# Patient Record
Sex: MALE | Race: WHITE | NOT HISPANIC OR LATINO | Employment: PART TIME | URBAN - METROPOLITAN AREA
[De-identification: names, ages, dates, MRNs, and addresses within clinical notes are randomized per-mention and may not be internally consistent; named-entity substitution may affect disease eponyms.]

---

## 2018-01-18 ENCOUNTER — GENERIC CONVERSION - ENCOUNTER (OUTPATIENT)
Dept: OTHER | Facility: OTHER | Age: 63
End: 2018-01-18

## 2018-01-24 VITALS
WEIGHT: 140 LBS | HEART RATE: 100 BPM | BODY MASS INDEX: 22.5 KG/M2 | SYSTOLIC BLOOD PRESSURE: 110 MMHG | OXYGEN SATURATION: 99 % | DIASTOLIC BLOOD PRESSURE: 60 MMHG | TEMPERATURE: 97.7 F | HEIGHT: 66 IN

## 2018-03-23 RX ORDER — CIPROFLOXACIN 500 MG/1
500 TABLET, FILM COATED ORAL 2 TIMES DAILY
Refills: 0 | COMMUNITY
Start: 2018-03-01 | End: 2018-04-30 | Stop reason: ALTCHOICE

## 2018-03-23 RX ORDER — ASPIRIN 81 MG/1
TABLET, CHEWABLE ORAL AS NEEDED
COMMUNITY
End: 2018-07-19 | Stop reason: ALTCHOICE

## 2018-03-26 ENCOUNTER — OFFICE VISIT (OUTPATIENT)
Dept: INTERNAL MEDICINE CLINIC | Facility: CLINIC | Age: 63
End: 2018-03-26
Payer: COMMERCIAL

## 2018-03-26 VITALS
HEIGHT: 66 IN | DIASTOLIC BLOOD PRESSURE: 58 MMHG | TEMPERATURE: 97.9 F | BODY MASS INDEX: 23.01 KG/M2 | SYSTOLIC BLOOD PRESSURE: 100 MMHG | HEART RATE: 96 BPM | OXYGEN SATURATION: 98 % | WEIGHT: 143.2 LBS

## 2018-03-26 DIAGNOSIS — K40.20 NON-RECURRENT BILATERAL INGUINAL HERNIA WITHOUT OBSTRUCTION OR GANGRENE: Primary | ICD-10-CM

## 2018-03-26 PROBLEM — K40.90 UNILATERAL INGUINAL HERNIA WITHOUT OBSTRUCTION OR GANGRENE: Status: ACTIVE | Noted: 2018-01-18

## 2018-03-26 PROCEDURE — 99213 OFFICE O/P EST LOW 20 MIN: CPT | Performed by: INTERNAL MEDICINE

## 2018-03-26 NOTE — ASSESSMENT & PLAN NOTE
Patient presents with some suspicious pathological findings that may represent a possible lymphoma  Symptomatically, he does not present with any suspicious findings or symptoms  Labs have been requested

## 2018-03-26 NOTE — PROGRESS NOTES
Assessment/Plan:    Inguinal hernia bilateral, non-recurrent    Patient presents with some suspicious pathological findings that may represent a possible lymphoma  Symptomatically, he does not present with any suspicious findings or symptoms  Labs have been requested  Diagnoses and all orders for this visit:    Non-recurrent bilateral inguinal hernia without obstruction or gangrene  -     CBC and differential  -     Uric acid  -     Sedimentation rate, automated  -     Leukemia/Lymphoma flow cytometry    Other orders  -     aspirin 81 mg chewable tablet; Chew  -     ciprofloxacin (CIPRO) 500 mg tablet; Take 500 mg by mouth 2 (two) times a day  -     Multiple Vitamin (MULTI VITAMIN MENS PO); Take 1 tablet by mouth daily          Subjective:      Patient ID: Amber Reyes is a 63 y o  male  Patient presents to the office for follow-up visit after recent bilateral hernia repair  The pathology of the left hernia sac was suspicious for possible  Low-grade B-cell lymphoma  The patient denies any symptoms of weight loss, night sweats, fatigue  Appetite is good  He has no complaints regarding his wound healing  Family History   Problem Relation Age of Onset    Parkinsonism Mother     Thyroid disease Mother     Diabetes Father     Other Father      Heart Artery Stent     Social History     Social History    Marital status: Single     Spouse name: N/A    Number of children: N/A    Years of education: N/A     Occupational History    Not on file       Social History Main Topics    Smoking status: Never Smoker    Smokeless tobacco: Not on file    Alcohol use Yes      Comment: Occasional    Drug use: No    Sexual activity: Not on file     Other Topics Concern    Not on file     Social History Narrative    Caffeine use         Past Medical History:   Diagnosis Date    Hyperglycemia     Last Assessed:1/18/18       Current Outpatient Prescriptions:     aspirin 81 mg chewable tablet, Chew, Disp: , Rfl:     Multiple Vitamin (MULTI VITAMIN MENS PO), Take 1 tablet by mouth daily, Disp: , Rfl:     ciprofloxacin (CIPRO) 500 mg tablet, Take 500 mg by mouth 2 (two) times a day, Disp: , Rfl: 0  No Known Allergies  Past Surgical History:   Procedure Laterality Date    WISDOM TOOTH EXTRACTION      Last Assessed:1/18/18             Review of Systems   Constitutional: Negative  HENT: Negative  Eyes: Negative  Respiratory: Negative  Cardiovascular: Negative  Gastrointestinal: Negative  Endocrine: Negative  Genitourinary: Negative  Musculoskeletal: Negative  Allergic/Immunologic: Negative  Neurological: Negative  Hematological: Negative  Psychiatric/Behavioral: Negative  Objective:      /58 (BP Location: Left arm, Patient Position: Sitting, Cuff Size: Standard)   Pulse 96   Temp 97 9 °F (36 6 °C) (Oral)   Ht 5' 6" (1 676 m)   Wt 65 kg (143 lb 3 2 oz)   SpO2 98%   BMI 23 11 kg/m²          Physical Exam   Constitutional: He is oriented to person, place, and time  He appears well-developed and well-nourished  No distress  HENT:   Head: Normocephalic and atraumatic  Right Ear: External ear normal    Left Ear: External ear normal    Mouth/Throat: Oropharynx is clear and moist    Eyes: Conjunctivae and EOM are normal  Pupils are equal, round, and reactive to light  No scleral icterus  Neck: Normal range of motion  Neck supple  No JVD present  No tracheal deviation present  No thyromegaly present  Cardiovascular: Normal rate, regular rhythm and normal heart sounds  Pulmonary/Chest: Effort normal and breath sounds normal    Abdominal: Soft  Bowel sounds are normal  There is no hepatosplenomegaly  Musculoskeletal: Normal range of motion  He exhibits no edema or deformity  Lymphadenopathy:     He has no cervical adenopathy  Neurological: He is alert and oriented to person, place, and time  No cranial nerve deficit   Coordination normal    Skin: Skin is warm and dry  He is not diaphoretic  Psychiatric: He has a normal mood and affect  His behavior is normal    Vitals reviewed

## 2018-03-30 ENCOUNTER — APPOINTMENT (OUTPATIENT)
Dept: LAB | Facility: CLINIC | Age: 63
End: 2018-03-30
Payer: COMMERCIAL

## 2018-03-30 LAB
BASOPHILS # BLD AUTO: 0.02 THOUSANDS/ΜL (ref 0–0.1)
BASOPHILS NFR BLD AUTO: 0 % (ref 0–1)
EOSINOPHIL # BLD AUTO: 0.08 THOUSAND/ΜL (ref 0–0.61)
EOSINOPHIL NFR BLD AUTO: 2 % (ref 0–6)
ERYTHROCYTE [DISTWIDTH] IN BLOOD BY AUTOMATED COUNT: 15.9 % (ref 11.6–15.1)
ERYTHROCYTE [SEDIMENTATION RATE] IN BLOOD: 119 MM/HOUR (ref 0–10)
HCT VFR BLD AUTO: 28.2 % (ref 36.5–49.3)
HGB BLD-MCNC: 9.1 G/DL (ref 12–17)
LYMPHOCYTES # BLD AUTO: 1.23 THOUSANDS/ΜL (ref 0.6–4.47)
LYMPHOCYTES NFR BLD AUTO: 25 % (ref 14–44)
MCH RBC QN AUTO: 29.7 PG (ref 26.8–34.3)
MCHC RBC AUTO-ENTMCNC: 32.3 G/DL (ref 31.4–37.4)
MCV RBC AUTO: 92 FL (ref 82–98)
MONOCYTES # BLD AUTO: 0.5 THOUSAND/ΜL (ref 0.17–1.22)
MONOCYTES NFR BLD AUTO: 10 % (ref 4–12)
NEUTROPHILS # BLD AUTO: 3.08 THOUSANDS/ΜL (ref 1.85–7.62)
NEUTS SEG NFR BLD AUTO: 63 % (ref 43–75)
NRBC BLD AUTO-RTO: 0 /100 WBCS
PLATELET # BLD AUTO: 307 THOUSANDS/UL (ref 149–390)
PMV BLD AUTO: 8.6 FL (ref 8.9–12.7)
RBC # BLD AUTO: 3.06 MILLION/UL (ref 3.88–5.62)
URATE SERPL-MCNC: 5.3 MG/DL (ref 4.2–8)
WBC # BLD AUTO: 4.96 THOUSAND/UL (ref 4.31–10.16)

## 2018-03-30 PROCEDURE — 88184 FLOWCYTOMETRY/ TC 1 MARKER: CPT | Performed by: INTERNAL MEDICINE

## 2018-03-30 PROCEDURE — 85025 COMPLETE CBC W/AUTO DIFF WBC: CPT | Performed by: INTERNAL MEDICINE

## 2018-03-30 PROCEDURE — 36415 COLL VENOUS BLD VENIPUNCTURE: CPT | Performed by: INTERNAL MEDICINE

## 2018-03-30 PROCEDURE — 88185 FLOWCYTOMETRY/TC ADD-ON: CPT

## 2018-03-30 PROCEDURE — 84550 ASSAY OF BLOOD/URIC ACID: CPT | Performed by: INTERNAL MEDICINE

## 2018-03-30 PROCEDURE — 85652 RBC SED RATE AUTOMATED: CPT | Performed by: INTERNAL MEDICINE

## 2018-04-03 LAB — SCAN RESULT: NORMAL

## 2018-04-06 DIAGNOSIS — D47.9 ATYPICAL LYMPHOPROLIFERATIVE DISORDER (HCC): Primary | ICD-10-CM

## 2018-04-30 ENCOUNTER — OFFICE VISIT (OUTPATIENT)
Dept: HEMATOLOGY ONCOLOGY | Facility: CLINIC | Age: 63
End: 2018-04-30
Payer: COMMERCIAL

## 2018-04-30 VITALS
TEMPERATURE: 98.8 F | RESPIRATION RATE: 18 BRPM | HEART RATE: 92 BPM | WEIGHT: 146 LBS | BODY MASS INDEX: 22.91 KG/M2 | SYSTOLIC BLOOD PRESSURE: 124 MMHG | OXYGEN SATURATION: 98 % | HEIGHT: 67 IN | DIASTOLIC BLOOD PRESSURE: 74 MMHG

## 2018-04-30 DIAGNOSIS — D72.820 MONOCLONAL B-CELL LYMPHOCYTOSIS: ICD-10-CM

## 2018-04-30 DIAGNOSIS — D64.9 ANEMIA, UNSPECIFIED TYPE: Primary | ICD-10-CM

## 2018-04-30 PROCEDURE — 99205 OFFICE O/P NEW HI 60 MIN: CPT | Performed by: INTERNAL MEDICINE

## 2018-04-30 NOTE — PROGRESS NOTES
Christie Rose  1955  [address] HEMATOLOGY ONCOLOGY [address] [address] [phone]    Chief Complaint   Patient presents with    Consult            No history exists  History of Present Illness:  -Tj Dumont MD:  -Previous Therapy (if not listed in Oncology History):  -Current Therapy (if not listed in Oncology History):  -Disease Status:  -Interval History:  -Tumor Markers:    Review of Systems   Constitutional: Negative for chills and fever  HENT: Negative for nosebleeds  Eyes: Negative for discharge  Respiratory: Negative for cough and shortness of breath  Cardiovascular: Negative for chest pain  Gastrointestinal: Negative for abdominal pain, constipation and diarrhea  Endocrine: Negative for polydipsia  Genitourinary: Negative for hematuria  Musculoskeletal: Negative for arthralgias  Skin: Negative for color change  Allergic/Immunologic: Negative for immunocompromised state  Neurological: Negative for dizziness and headaches  Hematological: Negative for adenopathy  Psychiatric/Behavioral: Negative for agitation  Patient Active Problem List   Diagnosis    Unilateral inguinal hernia without obstruction or gangrene    Inguinal hernia bilateral, non-recurrent     Past Medical History:   Diagnosis Date    Hyperglycemia     Last Assessed:1/18/18     Past Surgical History:   Procedure Laterality Date    WISDOM TOOTH EXTRACTION      Last Assessed:1/18/18     Family History   Problem Relation Age of Onset    Parkinsonism Mother     Thyroid disease Mother     Diabetes Father     Other Father      Heart Artery Stent     Social History     Social History    Marital status: Single     Spouse name: N/A    Number of children: N/A    Years of education: N/A     Occupational History    Not on file       Social History Main Topics    Smoking status: Never Smoker    Smokeless tobacco: Not on file    Alcohol use Yes      Comment: Occasional    Drug use: No    Sexual activity: Not on file     Other Topics Concern    Not on file     Social History Narrative    Caffeine use           Current Outpatient Prescriptions:     aspirin 81 mg chewable tablet, Chew  , Disp: , Rfl:     Multiple Vitamin (MULTI VITAMIN MENS PO), Take 1 tablet by mouth daily, Disp: , Rfl:   No Known Allergies  Vitals:    04/30/18 1034   BP: 124/74   Pulse: 92   Resp: 18   Temp: 98 8 °F (37 1 °C)   SpO2: 98%       Physical Exam   Constitutional: He is oriented to person, place, and time  He appears well-developed  HENT:   Head: Normocephalic  Eyes: Pupils are equal, round, and reactive to light  Neck: Neck supple  Cardiovascular: Normal rate and regular rhythm  No murmur heard  Pulmonary/Chest: Breath sounds normal  He has no wheezes  He has no rales  Abdominal: Soft  There is no tenderness  Musculoskeletal: Normal range of motion  He exhibits no edema or tenderness  Lymphadenopathy:     He has no cervical adenopathy  Neurological: He is alert and oriented to person, place, and time  He has normal reflexes  No cranial nerve deficit  Skin: No rash noted  No erythema  Psychiatric: He has a normal mood and affect  His behavior is normal          Labs:  CBC, Coags, BMP, Mg, Phos     Imaging  No results found  I reviewed the above laboratory and imaging data  Discussion/Summary:  In summary, this is a 53-year-old man who was found to have anemia about 6 weeks after bilateral inguinal hernia repair  He had not had blood work prior to that time  He has not had a colonoscopy  Interestingly, 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)  This is essentially the mature B lymphocyte analog of MGUS  This can be associated with autoimmune conditions, immune malignancies, chronic inflammatory processes, or as a stand-alone diagnosis    It would be unusual for anemia to be a complication of MB L  anemia workup is requested, therefore  He did have a significantly elevated sed rate raising the possibility of multiple myeloma or other conditions  Serologies are requested  I would favor proceeding with a CT scan but let us proceed with blood work, colonoscopy, etc and revisit the issue at his next visit  I reviewed the above at length with the patient and his brother  They voiced understanding and agreement

## 2018-04-30 NOTE — LETTER
April 30, 2018     Raman Delaney MD  [address]    Patient: Aida Blakely   YOB: 1955   Date of Visit: 4/30/2018       Dear Dr Rafael Lin: Thank you for referring Aida Blakely to me for evaluation  Below are my notes for this consultation  If you have questions, please do not hesitate to call me  I look forward to following your patient along with you  Sincerely,        Haylie Avila,         CC: DO Haylie Evans DO  4/30/2018 11:32 AM  Sign at close encounter    Aida Blakely  1955  [place]  [address]  [phone]    Chief Complaint   Patient presents with    Consult            No history exists  History of Present Illness:  -Jason Prajapati MD:  -Previous Therapy (if not listed in Oncology History):  -Current Therapy (if not listed in Oncology History):  -Disease Status:  -Interval History:  -Tumor Markers:    Review of Systems   Constitutional: Negative for chills and fever  HENT: Negative for nosebleeds  Eyes: Negative for discharge  Respiratory: Negative for cough and shortness of breath  Cardiovascular: Negative for chest pain  Gastrointestinal: Negative for abdominal pain, constipation and diarrhea  Endocrine: Negative for polydipsia  Genitourinary: Negative for hematuria  Musculoskeletal: Negative for arthralgias  Skin: Negative for color change  Allergic/Immunologic: Negative for immunocompromised state  Neurological: Negative for dizziness and headaches  Hematological: Negative for adenopathy  Psychiatric/Behavioral: Negative for agitation         Patient Active Problem List   Diagnosis    Unilateral inguinal hernia without obstruction or gangrene    Inguinal hernia bilateral, non-recurrent     Past Medical History:   Diagnosis Date    Hyperglycemia     Last Assessed:1/18/18     Past Surgical History:   Procedure Laterality Date    WISDOM TOOTH EXTRACTION      Last Assessed:1/18/18     Family History   Problem Relation Age of Onset    Parkinsonism Mother     Thyroid disease Mother     Diabetes Father     Other Father      Heart Artery Stent     Social History     Social History    Marital status: Single     Spouse name: N/A    Number of children: N/A    Years of education: N/A     Occupational History    Not on file  Social History Main Topics    Smoking status: Never Smoker    Smokeless tobacco: Not on file    Alcohol use Yes      Comment: Occasional    Drug use: No    Sexual activity: Not on file     Other Topics Concern    Not on file     Social History Narrative    Caffeine use           Current Outpatient Prescriptions:     aspirin 81 mg chewable tablet, Chew  , Disp: , Rfl:     Multiple Vitamin (MULTI VITAMIN MENS PO), Take 1 tablet by mouth daily, Disp: , Rfl:   No Known Allergies  Vitals:    04/30/18 1034   BP: 124/74   Pulse: 92   Resp: 18   Temp: 98 8 °F (37 1 °C)   SpO2: 98%       Physical Exam   Constitutional: He is oriented to person, place, and time  He appears well-developed  HENT:   Head: Normocephalic  Eyes: Pupils are equal, round, and reactive to light  Neck: Neck supple  Cardiovascular: Normal rate and regular rhythm  No murmur heard  Pulmonary/Chest: Breath sounds normal  He has no wheezes  He has no rales  Abdominal: Soft  There is no tenderness  Musculoskeletal: Normal range of motion  He exhibits no edema or tenderness  Lymphadenopathy:     He has no cervical adenopathy  Neurological: He is alert and oriented to person, place, and time  He has normal reflexes  No cranial nerve deficit  Skin: No rash noted  No erythema  Psychiatric: He has a normal mood and affect  His behavior is normal          Labs:  CBC, Coags, BMP, Mg, Phos     Imaging  No results found    I reviewed the above laboratory and imaging data  Discussion/Summary:  In summary, this is a 80-year-old man who was found to have anemia about 6 weeks after bilateral inguinal hernia repair  He had not had blood work prior to that time  He has not had a colonoscopy  Interestingly, 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)  This is essentially the mature B lymphocyte analog of MGUS  This can be associated with autoimmune conditions, immune malignancies, chronic inflammatory processes, or as a stand-alone diagnosis  It would be unusual for anemia to be a complication of MB L  anemia workup is requested, therefore  He did have a significantly elevated sed rate raising the possibility of multiple myeloma or other conditions  Serologies are requested  I would favor proceeding with a CT scan but let us proceed with blood work, colonoscopy, etc and revisit the issue at his next visit  I reviewed the above at length with the patient and his brother  They voiced understanding and agreement

## 2018-05-01 ENCOUNTER — OFFICE VISIT (OUTPATIENT)
Dept: INTERNAL MEDICINE CLINIC | Facility: CLINIC | Age: 63
End: 2018-05-01
Payer: COMMERCIAL

## 2018-05-01 ENCOUNTER — APPOINTMENT (OUTPATIENT)
Dept: RADIOLOGY | Age: 63
End: 2018-05-01
Payer: COMMERCIAL

## 2018-05-01 VITALS
SYSTOLIC BLOOD PRESSURE: 100 MMHG | OXYGEN SATURATION: 98 % | BODY MASS INDEX: 23.07 KG/M2 | HEIGHT: 67 IN | DIASTOLIC BLOOD PRESSURE: 58 MMHG | WEIGHT: 147 LBS | HEART RATE: 99 BPM | TEMPERATURE: 98.7 F
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
## 2018-05-01 DIAGNOSIS — R07.81 RIB PAIN ON LEFT SIDE: ICD-10-CM
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
## 2018-05-01 PROCEDURE — 99213 OFFICE O/P EST LOW 20 MIN: CPT | Performed by: INTERNAL MEDICINE

## 2018-05-01 PROCEDURE — 71101 X-RAY EXAM UNILAT RIBS/CHEST: CPT

## 2018-05-01 NOTE — ASSESSMENT & PLAN NOTE
Will order XR of ribs to evaluate for fracture  Recommend using an incentive spirometer  Continue with OTC tylenol or NSAIDs

## 2018-05-01 NOTE — PROGRESS NOTES
Assessment/Plan:    Rib pain on left side  Will order XR of ribs to evaluate for fracture  Recommend using an incentive spirometer  Continue with OTC tylenol or NSAIDs  Diagnoses and all orders for this visit:    Fall, initial encounter  -     XR ribs 2 vw left; Future  -     Respiratory Therapy Supplies (SPIROMETER) KIT; by Does not apply route every 2 (two) hours    Rib pain on left side  -     XR ribs 2 vw left; Future  -     Respiratory Therapy Supplies (SPIROMETER) KIT; by Does not apply route every 2 (two) hours          Subjective:      Patient ID: Shereen Nunez is a 63 y o  male  63year old male is seen today for evaluation of left back/rib pain that started after sustaining a mechanical fall  He fell 4-days ago  Denies any head or LOC  Back Pain   This is a new problem  The current episode started in the past 7 days  The problem is unchanged  Pain location: left posterior rib pain (~rib #10) The quality of the pain is described as stabbing  The pain does not radiate  The pain is at a severity of 5/10  The pain is moderate  The symptoms are aggravated by twisting  Stiffness is present all day  Pertinent negatives include no abdominal pain, bladder incontinence, bowel incontinence, chest pain, dysuria, fever, headaches, leg pain, numbness, paresis, paresthesias, pelvic pain, perianal numbness, tingling, weakness or weight loss  He has tried NSAIDs for the symptoms  The treatment provided mild relief  Fall   The accident occurred 3 to 5 days ago  The fall occurred while walking  He fell from a height of 1 to 2 ft  He landed on hard floor  Point of impact: left posterior ribs, back  The pain is moderate  Pertinent negatives include no abdominal pain, bowel incontinence, fever, headaches, hematuria, nausea, numbness or tingling         The following portions of the patient's history were reviewed and updated as appropriate: allergies, current medications, past family history, past medical history, past social history, past surgical history and problem list     Review of Systems   Constitutional: Negative  Negative for chills, fatigue, fever and weight loss  HENT: Negative for congestion, ear pain, postnasal drip, rhinorrhea and sore throat  Eyes: Negative  Respiratory: Negative for cough, chest tightness, shortness of breath and wheezing  Cardiovascular: Negative for chest pain and palpitations  Gastrointestinal: Negative for abdominal distention, abdominal pain, blood in stool, bowel incontinence, constipation, diarrhea and nausea  Endocrine: Negative  Genitourinary: Negative for bladder incontinence, difficulty urinating, dysuria, hematuria and pelvic pain  Musculoskeletal: Positive for back pain  Skin: Negative  Allergic/Immunologic: Negative for environmental allergies and food allergies  Neurological: Negative  Negative for tingling, weakness, numbness, headaches and paresthesias  Hematological: Negative for adenopathy  Psychiatric/Behavioral: Negative for agitation, behavioral problems, confusion and sleep disturbance           Past Medical History:   Diagnosis Date    Anemia, unspecified 4/30/2018    Hyperglycemia     Last Assessed:1/18/18         Current Outpatient Prescriptions:     aspirin 81 mg chewable tablet, Chew  , Disp: , Rfl:     Multiple Vitamin (MULTI VITAMIN MENS PO), Take 1 tablet by mouth daily, Disp: , Rfl:     Respiratory Therapy Supplies (SPIROMETER) KIT, by Does not apply route every 2 (two) hours, Disp: 1 kit, Rfl: 0    No Known Allergies    Social History   Past Surgical History:   Procedure Laterality Date    WISDOM TOOTH EXTRACTION      Last Assessed:1/18/18     Family History   Problem Relation Age of Onset   Gerard Parkinsonism Mother     Thyroid disease Mother     Diabetes Father     Other Father      Heart Artery Stent       Objective:  /58 (BP Location: Left arm, Patient Position: Sitting, Cuff Size: Standard)   Pulse 99 Temp 98 7 °F (37 1 °C) (Oral)   Ht 5' 7 3" (1 709 m)   Wt 66 7 kg (147 lb)   SpO2 98%   BMI 22 82 kg/m²     Recent Results (from the past 1344 hour(s))   CBC and differential    Collection Time: 03/30/18 12:17 PM   Result Value Ref Range    WBC 4 96 4 31 - 10 16 Thousand/uL    RBC 3 06 (L) 3 88 - 5 62 Million/uL    Hemoglobin 9 1 (L) 12 0 - 17 0 g/dL    Hematocrit 28 2 (L) 36 5 - 49 3 %    MCV 92 82 - 98 fL    MCH 29 7 26 8 - 34 3 pg    MCHC 32 3 31 4 - 37 4 g/dL    RDW 15 9 (H) 11 6 - 15 1 %    MPV 8 6 (L) 8 9 - 12 7 fL    Platelets 691 066 - 803 Thousands/uL    nRBC 0 /100 WBCs    Neutrophils Relative 63 43 - 75 %    Lymphocytes Relative 25 14 - 44 %    Monocytes Relative 10 4 - 12 %    Eosinophils Relative 2 0 - 6 %    Basophils Relative 0 0 - 1 %    Neutrophils Absolute 3 08 1 85 - 7 62 Thousands/µL    Lymphocytes Absolute 1 23 0 60 - 4 47 Thousands/µL    Monocytes Absolute 0 50 0 17 - 1 22 Thousand/µL    Eosinophils Absolute 0 08 0 00 - 0 61 Thousand/µL    Basophils Absolute 0 02 0 00 - 0 10 Thousands/µL   Uric acid    Collection Time: 03/30/18 12:17 PM   Result Value Ref Range    Uric Acid 5 3 4 2 - 8 0 mg/dL   Sedimentation rate, automated    Collection Time: 03/30/18 12:17 PM   Result Value Ref Range    Sed Rate 119 (H) 0 - 10 mm/hour   Leukemia/Lymphoma flow cytometry    Collection Time: 03/30/18 12:17 PM   Result Value Ref Range    Scan Result SEE WRITTEN REPORT FROM [facility]             Physical Exam   Constitutional: He is oriented to person, place, and time  He appears well-developed and well-nourished  No distress  HENT:   Head: Normocephalic and atraumatic  Eyes: Conjunctivae and EOM are normal  Pupils are equal, round, and reactive to light  Right eye exhibits no discharge  Left eye exhibits no discharge  No scleral icterus  Neck: Normal range of motion  Neck supple  No JVD present  No thyromegaly present     Cardiovascular: Normal rate, regular rhythm, normal heart sounds and intact distal pulses  Exam reveals no gallop and no friction rub  No murmur heard  Pulmonary/Chest: Effort normal and breath sounds normal  No respiratory distress  He has no wheezes  He has no rales  He exhibits no tenderness  Abdominal: Soft  Bowel sounds are normal  He exhibits no distension and no mass  There is no tenderness  There is no rebound and no guarding  Musculoskeletal: Normal range of motion  He exhibits no edema, tenderness or deformity  Lymphadenopathy:     He has no cervical adenopathy  Neurological: He is alert and oriented to person, place, and time  He has normal reflexes  No cranial nerve deficit  Coordination normal    Skin: Skin is warm and dry  No rash noted  He is not diaphoretic  No erythema  No pallor  Psychiatric: He has a normal mood and affect  His behavior is normal  Judgment and thought content normal    Nursing note and vitals reviewed

## 2018-05-07 ENCOUNTER — APPOINTMENT (OUTPATIENT)
Dept: LAB | Facility: CLINIC | Age: 63
End: 2018-05-07
Payer: COMMERCIAL

## 2018-05-07 DIAGNOSIS — D64.9 ANEMIA, UNSPECIFIED TYPE: ICD-10-CM

## 2018-05-07 LAB
ALBUMIN SERPL BCP-MCNC: 3 G/DL (ref 3.5–5)
ALP SERPL-CCNC: 120 U/L (ref 46–116)
ALT SERPL W P-5'-P-CCNC: 17 U/L (ref 12–78)
ANION GAP SERPL CALCULATED.3IONS-SCNC: 6 MMOL/L (ref 4–13)
AST SERPL W P-5'-P-CCNC: 16 U/L (ref 5–45)
BASOPHILS # BLD AUTO: 0.02 THOUSANDS/ΜL (ref 0–0.1)
BASOPHILS NFR BLD AUTO: 0 % (ref 0–1)
BILIRUB SERPL-MCNC: 0.21 MG/DL (ref 0.2–1)
BUN SERPL-MCNC: 12 MG/DL (ref 5–25)
CALCIUM SERPL-MCNC: 9.6 MG/DL (ref 8.3–10.1)
CHLORIDE SERPL-SCNC: 103 MMOL/L (ref 100–108)
CO2 SERPL-SCNC: 25 MMOL/L (ref 21–32)
CREAT SERPL-MCNC: 1.01 MG/DL (ref 0.6–1.3)
CRP SERPL QL: 20.8 MG/L
EOSINOPHIL # BLD AUTO: 0.1 THOUSAND/ΜL (ref 0–0.61)
EOSINOPHIL NFR BLD AUTO: 2 % (ref 0–6)
ERYTHROCYTE [DISTWIDTH] IN BLOOD BY AUTOMATED COUNT: 16.8 % (ref 11.6–15.1)
FERRITIN SERPL-MCNC: 33 NG/ML (ref 8–388)
FOLATE SERPL-MCNC: >20 NG/ML (ref 3.1–17.5)
GFR SERPL CREATININE-BSD FRML MDRD: 80 ML/MIN/1.73SQ M
GLUCOSE P FAST SERPL-MCNC: 100 MG/DL (ref 65–99)
HCT VFR BLD AUTO: 27.8 % (ref 36.5–49.3)
HGB BLD-MCNC: 8.9 G/DL (ref 12–17)
IRON SATN MFR SERPL: 45 %
IRON SERPL-MCNC: 120 UG/DL (ref 65–175)
LYMPHOCYTES # BLD AUTO: 1.31 THOUSANDS/ΜL (ref 0.6–4.47)
LYMPHOCYTES NFR BLD AUTO: 27 % (ref 14–44)
MCH RBC QN AUTO: 29.7 PG (ref 26.8–34.3)
MCHC RBC AUTO-ENTMCNC: 32 G/DL (ref 31.4–37.4)
MCV RBC AUTO: 93 FL (ref 82–98)
MONOCYTES # BLD AUTO: 0.5 THOUSAND/ΜL (ref 0.17–1.22)
MONOCYTES NFR BLD AUTO: 10 % (ref 4–12)
NEUTROPHILS # BLD AUTO: 2.87 THOUSANDS/ΜL (ref 1.85–7.62)
NEUTS SEG NFR BLD AUTO: 61 % (ref 43–75)
NRBC BLD AUTO-RTO: 0 /100 WBCS
PLATELET # BLD AUTO: 298 THOUSANDS/UL (ref 149–390)
PMV BLD AUTO: 8.6 FL (ref 8.9–12.7)
POTASSIUM SERPL-SCNC: 4 MMOL/L (ref 3.5–5.3)
PROT SERPL-MCNC: 9.5 G/DL (ref 6.4–8.2)
RBC # BLD AUTO: 3 MILLION/UL (ref 3.88–5.62)
SODIUM SERPL-SCNC: 134 MMOL/L (ref 136–145)
TIBC SERPL-MCNC: 267 UG/DL (ref 250–450)
TSH SERPL DL<=0.05 MIU/L-ACNC: 2.99 UIU/ML (ref 0.36–3.74)
VIT B12 SERPL-MCNC: 371 PG/ML (ref 100–900)
WBC # BLD AUTO: 4.84 THOUSAND/UL (ref 4.31–10.16)

## 2018-05-07 PROCEDURE — 80053 COMPREHEN METABOLIC PANEL: CPT

## 2018-05-07 PROCEDURE — 86038 ANTINUCLEAR ANTIBODIES: CPT

## 2018-05-07 PROCEDURE — 82728 ASSAY OF FERRITIN: CPT

## 2018-05-07 PROCEDURE — 36415 COLL VENOUS BLD VENIPUNCTURE: CPT

## 2018-05-07 PROCEDURE — 86334 IMMUNOFIX E-PHORESIS SERUM: CPT

## 2018-05-07 PROCEDURE — 86334 IMMUNOFIX E-PHORESIS SERUM: CPT | Performed by: PATHOLOGY

## 2018-05-07 PROCEDURE — 83550 IRON BINDING TEST: CPT

## 2018-05-07 PROCEDURE — 83540 ASSAY OF IRON: CPT

## 2018-05-07 PROCEDURE — 86140 C-REACTIVE PROTEIN: CPT

## 2018-05-07 PROCEDURE — 85025 COMPLETE CBC W/AUTO DIFF WBC: CPT

## 2018-05-07 PROCEDURE — 86430 RHEUMATOID FACTOR TEST QUAL: CPT

## 2018-05-07 PROCEDURE — 84165 PROTEIN E-PHORESIS SERUM: CPT

## 2018-05-07 PROCEDURE — 82746 ASSAY OF FOLIC ACID SERUM: CPT

## 2018-05-07 PROCEDURE — 84443 ASSAY THYROID STIM HORMONE: CPT

## 2018-05-07 PROCEDURE — 84165 PROTEIN E-PHORESIS SERUM: CPT | Performed by: PATHOLOGY

## 2018-05-07 PROCEDURE — 82607 VITAMIN B-12: CPT

## 2018-05-08 LAB — RHEUMATOID FACT SER QL LA: NEGATIVE

## 2018-05-09 ENCOUNTER — APPOINTMENT (OUTPATIENT)
Dept: LAB | Facility: CLINIC | Age: 63
End: 2018-05-09
Payer: COMMERCIAL

## 2018-05-09 ENCOUNTER — TELEPHONE (OUTPATIENT)
Dept: INTERNAL MEDICINE CLINIC | Age: 63
End: 2018-05-09

## 2018-05-09 DIAGNOSIS — D64.9 ANEMIA, UNSPECIFIED TYPE: ICD-10-CM

## 2018-05-09 LAB
ALBUMIN SERPL ELPH-MCNC: 3.68 G/DL (ref 3.5–5)
ALBUMIN SERPL ELPH-MCNC: 39.6 % (ref 52–65)
ALPHA1 GLOB SERPL ELPH-MCNC: 0.41 G/DL (ref 0.1–0.4)
ALPHA1 GLOB SERPL ELPH-MCNC: 4.4 % (ref 2.5–5)
ALPHA2 GLOB SERPL ELPH-MCNC: 1.16 G/DL (ref 0.4–1.2)
ALPHA2 GLOB SERPL ELPH-MCNC: 12.5 % (ref 7–13)
BETA GLOB ABNORMAL SERPL ELPH-MCNC: 0.41 G/DL (ref 0.4–0.8)
BETA1 GLOB SERPL ELPH-MCNC: 4.4 % (ref 5–13)
BETA2 GLOB SERPL ELPH-MCNC: 2.8 % (ref 2–8)
BETA2+GAMMA GLOB SERPL ELPH-MCNC: 0.26 G/DL (ref 0.2–0.5)
GAMMA GLOB ABNORMAL SERPL ELPH-MCNC: 3.38 G/DL (ref 0.5–1.6)
GAMMA GLOB SERPL ELPH-MCNC: 36.3 % (ref 12–22)
HEMOCCULT STL QL IA: NEGATIVE
IGG/ALB SER: 0.66 {RATIO} (ref 1.1–1.8)
INTERPRETATION UR IFE-IMP: NORMAL
M PROTEIN 1 MFR SERPL ELPH: 32 %
M PROTEIN 1 SERPL ELPH-MCNC: 2.98 G/DL
PROT SERPL-MCNC: 9.3 G/DL (ref 6.4–8.2)
RYE IGE QN: NEGATIVE

## 2018-05-09 PROCEDURE — G0328 FECAL BLOOD SCRN IMMUNOASSAY: HCPCS

## 2018-05-09 NOTE — TELEPHONE ENCOUNTER
Patient returned my call  He had no questions and verbalized that he was aware of the results and treatment recommendations

## 2018-05-09 NOTE — TELEPHONE ENCOUNTER
----- Message from Memo Elmore RN sent at 5/9/2018 10:22 AM EDT -----  #2 Non urgent Message left on [phone] requesting a call back for results  ----- Message -----  From: Mitch Blunt MD  Sent: 5/7/2018   9:33 AM  To: Memo Elmore RN    Can you please contact Mr Ted Potter him of the results of CXR, which showed no acute fractures  He should continue use of incentive spirometer until rib pain resolves  Thank you

## 2018-05-14 ENCOUNTER — TRANSCRIBE ORDERS (OUTPATIENT)
Dept: LAB | Facility: CLINIC | Age: 63
End: 2018-05-14

## 2018-05-14 ENCOUNTER — APPOINTMENT (OUTPATIENT)
Dept: LAB | Facility: CLINIC | Age: 63
End: 2018-05-14
Payer: COMMERCIAL

## 2018-05-14 PROCEDURE — 83883 ASSAY NEPHELOMETRY NOT SPEC: CPT

## 2018-05-14 PROCEDURE — 36415 COLL VENOUS BLD VENIPUNCTURE: CPT

## 2018-05-15 LAB
KAPPA LC FREE SER-MCNC: 190.3 MG/L (ref 3.3–19.4)
KAPPA LC FREE/LAMBDA FREE SER: 36.6 {RATIO} (ref 0.26–1.65)
LAMBDA LC FREE SERPL-MCNC: 5.2 MG/L (ref 5.7–26.3)

## 2018-05-31 ENCOUNTER — OFFICE VISIT (OUTPATIENT)
Dept: HEMATOLOGY ONCOLOGY | Facility: CLINIC | Age: 63
End: 2018-05-31
Payer: COMMERCIAL

## 2018-05-31 VITALS
BODY MASS INDEX: 22.26 KG/M2 | RESPIRATION RATE: 16 BRPM | HEART RATE: 89 BPM | OXYGEN SATURATION: 97 % | DIASTOLIC BLOOD PRESSURE: 70 MMHG | TEMPERATURE: 98.6 F | SYSTOLIC BLOOD PRESSURE: 106 MMHG | WEIGHT: 141.8 LBS | HEIGHT: 67 IN

## 2018-05-31 DIAGNOSIS — D64.9 ANEMIA, UNSPECIFIED TYPE: ICD-10-CM

## 2018-05-31 DIAGNOSIS — D72.820 MONOCLONAL B-CELL LYMPHOCYTOSIS: Primary | ICD-10-CM

## 2018-05-31 PROCEDURE — 99215 OFFICE O/P EST HI 40 MIN: CPT | Performed by: INTERNAL MEDICINE

## 2018-05-31 NOTE — PROGRESS NOTES
Teofilo Abernathy  1955  [facility] HEMATOLOGY ONCOLOGY Tatyana Yaneth Adhikari [address] [address] [phone]    Chief Complaint   Patient presents with    Follow-up          No history exists  History of Present Illness:  -No ref  provider found:    -Interval History:  Patient is clinically stable  He recently had a colonoscopy which was incomplete  CT scan is requested as supplement  -Tumor Markers:    Review of Systems:  Review of Systems   Constitutional: Negative for chills and fever  HENT: Negative for nosebleeds  Eyes: Negative for discharge  Respiratory: Negative for cough and shortness of breath  Cardiovascular: Negative for chest pain  Gastrointestinal: Negative for abdominal pain, constipation and diarrhea  Endocrine: Negative for polydipsia  Genitourinary: Negative for hematuria  Musculoskeletal: Negative for arthralgias  Skin: Negative for color change  Allergic/Immunologic: Negative for immunocompromised state  Neurological: Negative for dizziness and headaches  Hematological: Negative for adenopathy  Psychiatric/Behavioral: Negative for agitation         Patient Active Problem List   Diagnosis    Unilateral inguinal hernia without obstruction or gangrene    Inguinal hernia bilateral, non-recurrent    Anemia, unspecified    Monoclonal B-cell lymphocytosis    Fall    Rib pain on left side     Past Medical History:   Diagnosis Date    Anemia, unspecified 4/30/2018    Hyperglycemia     Last Assessed:1/18/18     Past Surgical History:   Procedure Laterality Date    WISDOM TOOTH EXTRACTION      Last Assessed:1/18/18     Family History   Problem Relation Age of Onset    Parkinsonism Mother     Thyroid disease Mother     Diabetes Father     Other Father      Heart Artery Stent     Social History     Social History    Marital status: Single     Spouse name: N/A    Number of children: N/A    Years of education: N/A     Occupational History    Not on file  Social History Main Topics    Smoking status: Never Smoker    Smokeless tobacco: Former User    Alcohol use Yes      Comment: Occasional    Drug use: No    Sexual activity: Not on file     Other Topics Concern    Not on file     Social History Narrative    Caffeine use           Current Outpatient Prescriptions:     aspirin 81 mg chewable tablet, Chew  , Disp: , Rfl:     Multiple Vitamin (MULTI VITAMIN MENS PO), Take 1 tablet by mouth daily, Disp: , Rfl:   No Known Allergies  Vitals:    05/31/18 1205   BP: 106/70   Pulse: 89   Resp: 16   Temp: 98 6 °F (37 °C)   SpO2: 97%         Physical Exam   Constitutional: He is oriented to person, place, and time  He appears well-developed  HENT:   Head: Normocephalic  Eyes: Pupils are equal, round, and reactive to light  Neck: Neck supple  Cardiovascular: Normal rate and regular rhythm  No murmur heard  Pulmonary/Chest: Breath sounds normal  He has no wheezes  He has no rales  Abdominal: Soft  There is no tenderness  Musculoskeletal: Normal range of motion  He exhibits no edema or tenderness  Lymphadenopathy:     He has no cervical adenopathy  Neurological: He is alert and oriented to person, place, and time  He has normal reflexes  No cranial nerve deficit  Abnormal muscle tone:   Skin: No rash noted  No erythema  Psychiatric: He has a normal mood and affect  His behavior is normal            Performance Status: ECOG/Zubrod/WHO: 0 - Asymptomatic    Labs:  CBC, Coags, BMP, Mg, Phos     Imaging  Xr Ribs Left W Pa Chest Min 3 Views    Result Date: 5/5/2018  Narrative: LEFT RIBS AND CHEST -DUAL ENERGY INDICATION:   W19  XXXA: Unspecified fall, initial encounter R07 81: Pleurodynia  COMPARISON:  None EXAM PERFORMED/VIEWS:  XR RIBS LEFT W PA CHEST MIN 3 VIEWS FINDINGS: The cardiomediastinal silhouette is unremarkable  Lungs are clear  No pleural effusions  There is no pneumothorax   No rib fractures are identified  Impression: No active cardiopulmonary disease  No evidence of rib fractures  Workstation performed: [id number]     I reviewed the above laboratory and imaging data  Discussion/Summary:  In summary, this is a 57-year-old male history of monoclonal B-cell lymphocytosis  Additionally, he had anemia  Etiology was unclear  SPEP showed monoclonal peak  Immunofixation showed IgM kappa  Free light chains showed free kappa 190 3, free lambda 5 2, ratio 36 6  Myeloma versus Waldenstroms are considered  MGUS is another consideration although less likely given the height of his free light chain ratio  We reviewed that studies are requested  Quantitative immunoglobulins, skeletal survey, bone marrow biopsy  We reviewed the nature of the bone marrow biopsy procedure  IR is requested  I reviewed the above with the patient and his brother  They voiced understanding and agreement

## 2018-06-04 ENCOUNTER — APPOINTMENT (OUTPATIENT)
Dept: LAB | Age: 63
End: 2018-06-04
Payer: COMMERCIAL

## 2018-06-04 ENCOUNTER — APPOINTMENT (OUTPATIENT)
Dept: RADIOLOGY | Age: 63
End: 2018-06-04
Payer: COMMERCIAL

## 2018-06-04 DIAGNOSIS — D72.820 MONOCLONAL B-CELL LYMPHOCYTOSIS: ICD-10-CM

## 2018-06-04 LAB
IGA SERPL-MCNC: 21 MG/DL (ref 70–400)
IGG SERPL-MCNC: 353 MG/DL (ref 700–1600)
IGM SERPL-MCNC: 3730 MG/DL (ref 40–230)

## 2018-06-04 PROCEDURE — 36415 COLL VENOUS BLD VENIPUNCTURE: CPT

## 2018-06-04 PROCEDURE — 82784 ASSAY IGA/IGD/IGG/IGM EACH: CPT

## 2018-06-04 PROCEDURE — 77075 RADEX OSSEOUS SURVEY COMPL: CPT

## 2018-06-07 ENCOUNTER — HOSPITAL ENCOUNTER (OUTPATIENT)
Dept: RADIOLOGY | Facility: HOSPITAL | Age: 63
Discharge: HOME | End: 2018-06-07
Attending: INTERNAL MEDICINE | Admitting: RADIOLOGY
Payer: COMMERCIAL

## 2018-06-07 VITALS
BODY MASS INDEX: 22.13 KG/M2 | DIASTOLIC BLOOD PRESSURE: 63 MMHG | SYSTOLIC BLOOD PRESSURE: 96 MMHG | TEMPERATURE: 98.9 F | RESPIRATION RATE: 16 BRPM | HEART RATE: 78 BPM | OXYGEN SATURATION: 98 % | WEIGHT: 141 LBS | HEIGHT: 67 IN

## 2018-06-07 DIAGNOSIS — D72.820 MONOCLONAL B-CELL LYMPHOCYTOSIS: ICD-10-CM

## 2018-06-07 PROCEDURE — 88305 TISSUE EXAM BY PATHOLOGIST: CPT | Performed by: PATHOLOGY

## 2018-06-07 PROCEDURE — 88237 TISSUE CULTURE BONE MARROW: CPT | Performed by: INTERNAL MEDICINE

## 2018-06-07 PROCEDURE — 88341 IMHCHEM/IMCYTCHM EA ADD ANTB: CPT | Performed by: PATHOLOGY

## 2018-06-07 PROCEDURE — 88184 FLOWCYTOMETRY/ TC 1 MARKER: CPT | Performed by: INTERNAL MEDICINE

## 2018-06-07 PROCEDURE — 88313 SPECIAL STAINS GROUP 2: CPT | Performed by: PATHOLOGY

## 2018-06-07 PROCEDURE — 88342 IMHCHEM/IMCYTCHM 1ST ANTB: CPT | Performed by: PATHOLOGY

## 2018-06-07 PROCEDURE — 38222 DX BONE MARROW BX & ASPIR: CPT

## 2018-06-07 PROCEDURE — 85097 BONE MARROW INTERPRETATION: CPT | Performed by: PATHOLOGY

## 2018-06-07 PROCEDURE — 88365 INSITU HYBRIDIZATION (FISH): CPT | Performed by: PATHOLOGY

## 2018-06-07 PROCEDURE — 88364 INSITU HYBRIDIZATION (FISH): CPT | Performed by: PATHOLOGY

## 2018-06-07 PROCEDURE — 99152 MOD SED SAME PHYS/QHP 5/>YRS: CPT

## 2018-06-07 PROCEDURE — 88333 PATH CONSLTJ SURG CYTO XM 1: CPT | Performed by: PATHOLOGY

## 2018-06-07 PROCEDURE — 88189 FLOWCYTOMETRY/READ 16 & >: CPT | Performed by: PATHOLOGY

## 2018-06-07 PROCEDURE — 77012 CT SCAN FOR NEEDLE BIOPSY: CPT

## 2018-06-07 PROCEDURE — 81450 HL NEO GSAP 5-50DNA/DNA&RNA: CPT | Performed by: INTERNAL MEDICINE

## 2018-06-07 PROCEDURE — 88185 FLOWCYTOMETRY/TC ADD-ON: CPT

## 2018-06-07 PROCEDURE — 88360 TUMOR IMMUNOHISTOCHEM/MANUAL: CPT | Performed by: PATHOLOGY

## 2018-06-07 PROCEDURE — 88311 DECALCIFY TISSUE: CPT | Performed by: PATHOLOGY

## 2018-06-07 PROCEDURE — 88262 CHROMOSOME ANALYSIS 15-20: CPT | Performed by: INTERNAL MEDICINE

## 2018-06-07 PROCEDURE — 99153 MOD SED SAME PHYS/QHP EA: CPT

## 2018-06-07 RX ORDER — SODIUM CHLORIDE 9 MG/ML
75 INJECTION, SOLUTION INTRAVENOUS CONTINUOUS
Status: DISCONTINUED | OUTPATIENT
Start: 2018-06-07 | End: 2018-06-07 | Stop reason: HOSPADM

## 2018-06-07 RX ORDER — FENTANYL CITRATE 50 UG/ML
INJECTION, SOLUTION INTRAMUSCULAR; INTRAVENOUS CODE/TRAUMA/SEDATION MEDICATION
Status: COMPLETED | OUTPATIENT
Start: 2018-06-07 | End: 2018-06-07

## 2018-06-07 RX ORDER — MIDAZOLAM HYDROCHLORIDE 1 MG/ML
INJECTION INTRAMUSCULAR; INTRAVENOUS CODE/TRAUMA/SEDATION MEDICATION
Status: COMPLETED | OUTPATIENT
Start: 2018-06-07 | End: 2018-06-07

## 2018-06-07 RX ADMIN — FENTANYL CITRATE 50 MCG: 50 INJECTION, SOLUTION INTRAMUSCULAR; INTRAVENOUS at 07:41

## 2018-06-07 RX ADMIN — FENTANYL CITRATE 50 MCG: 50 INJECTION, SOLUTION INTRAMUSCULAR; INTRAVENOUS at 07:48

## 2018-06-07 RX ADMIN — MIDAZOLAM 1 MG: 1 INJECTION INTRAMUSCULAR; INTRAVENOUS at 07:48

## 2018-06-07 RX ADMIN — MIDAZOLAM 1 MG: 1 INJECTION INTRAMUSCULAR; INTRAVENOUS at 07:41

## 2018-06-07 RX ADMIN — FENTANYL CITRATE 25 MCG: 50 INJECTION, SOLUTION INTRAMUSCULAR; INTRAVENOUS at 08:02

## 2018-06-07 RX ADMIN — SODIUM CHLORIDE 75 ML/HR: 0.9 INJECTION, SOLUTION INTRAVENOUS at 07:08

## 2018-06-07 NOTE — BRIEF OP NOTE (RAD/CATH)
CT BONE MARROW BIOPSY AND ASPIRATION  Procedure Note    PATIENT NAME: Nga Romero  : 1955  MRN: [id number]     Pre-op Diagnosis:   1  Monoclonal B-cell lymphocytosis      Post-op Diagnosis:   1  Monoclonal B-cell lymphocytosis        Surgeon:   Kevin Mckeon MD  Assistants:     No qualified resident was available  Estimated Blood Loss: None  Findings: Left posterior superior iliac crest amenable  Specimens: 7 mL bone marrow; 2 cm bone core  Complications:  None      Anesthesia: Conscious sedation and Ben Delacruz MD     Date: 2018  Time: 8:09 AM

## 2018-06-07 NOTE — DISCHARGE INSTRUCTIONS
Bone Marrow Biopsy     WHAT YOU NEED TO KNOW:   A bone marrow biopsy is a procedure to remove a small amount of bone marrow from your bone  Bone marrow is the soft tissue inside your bone that helps to make blood cells  The sample is tested for disease or infection  DISCHARGE INSTRUCTIONS:     1  Limit your activities day of biopsy as directed by your doctor  2  Use medication as ordered  3  Return to your normal diet  Small sips of flat soda will help with nausea  4  Remove band-aid or dressing 24 hours after procedure  Contact Interventional Radiology at [phone] [place] PATIENTS: Contact Interventional Radiology at [phone]) Elisa Velasco PATIENTS: Contact Interventional Radiology at [phone]) if:    1  Difficulty breathing, nausea or vomiting  2  Chills or fever above 101 F     3  Pain at biopsy site not relieved by medication  4  Develop any redness, swelling, heat, unusual drainage, heavy bruising or bleeding from biopsy site

## 2018-06-09 LAB — MISCELLANEOUS LAB TEST RESULT: NORMAL

## 2018-06-12 LAB — MISCELLANEOUS LAB TEST RESULT: NORMAL

## 2018-06-21 ENCOUNTER — OFFICE VISIT (OUTPATIENT)
Dept: HEMATOLOGY ONCOLOGY | Facility: CLINIC | Age: 63
End: 2018-06-21
Payer: COMMERCIAL

## 2018-06-21 VITALS
RESPIRATION RATE: 18 BRPM | WEIGHT: 143.8 LBS | OXYGEN SATURATION: 97 % | HEART RATE: 93 BPM | TEMPERATURE: 97.9 F | HEIGHT: 67 IN | BODY MASS INDEX: 22.57 KG/M2 | SYSTOLIC BLOOD PRESSURE: 132 MMHG | DIASTOLIC BLOOD PRESSURE: 74 MMHG

## 2018-06-21 DIAGNOSIS — D72.820 MONOCLONAL B-CELL LYMPHOCYTOSIS: Primary | ICD-10-CM

## 2018-06-21 DIAGNOSIS — C88.0 WALDENSTROM'S MACROGLOBULINEMIA (HCC): ICD-10-CM

## 2018-06-21 PROBLEM — C88.00 WALDENSTROM'S MACROGLOBULINEMIA: Status: ACTIVE | Noted: 2018-06-21

## 2018-06-21 PROCEDURE — 99215 OFFICE O/P EST HI 40 MIN: CPT | Performed by: INTERNAL MEDICINE

## 2018-06-21 NOTE — PROGRESS NOTES
[facility]ATOLOG[facility]  Onofre Gomez [address] [address] [phone]    Rakel Merino, [id number]  06/21/18    Discussion:   In summary, this is a 27-year-old male history of recently detected Waldenstroms macroglobulinemia  Bone marrow biopsy is consistent with the diagnosis showing lymphoplasmacytic disorder with positive MYD88  Iron studies were intact  I reviewed with the patient and his brother that this is a malignancy of the immune system which is felt to be indolent though progressive  He is symptomatic with anemia at this time  Treatment is indicated, therefore  We reviewed that there are variety of treatment options that could be considered  Ibrutinib carries a category 1 recommendation by the NCCN  My inclination would be to proceed with this treatment at 280 mg p o  daily which would allow up or downward adjustment according to tolerance or/efficacy  We reviewed potential toxicities including but not limited to diarrhea, nausea, fatigue, cytopenias  Rare episodes of cardiac dysrhythmia or bleeding have been reported  Infections are also possible though not particularly common  Our chemotherapy nurse review the above information as well as providing written information in this regard  I discussed the above with the patient  The patient and his brother voiced understanding and agreement   ______________________________________________________________________    Chief Complaint   Patient presents with    Follow-up       HPI:   No history exists  Interval History:  Clinically stable  0 - Asymptomatic    Review of Systems   Constitutional: Negative for chills and fever  HENT: Negative for nosebleeds  Eyes: Negative for discharge  Respiratory: Negative for cough and shortness of breath  Cardiovascular: Negative for chest pain     Gastrointestinal: Negative for abdominal pain, constipation and diarrhea  Endocrine: Negative for polydipsia  Genitourinary: Negative for hematuria  Musculoskeletal: Negative for arthralgias  Skin: Negative for color change  Allergic/Immunologic: Negative for immunocompromised state  Neurological: Negative for dizziness and headaches  Hematological: Negative for adenopathy  Psychiatric/Behavioral: Negative for agitation  Past Medical History:   Diagnosis Date    Anemia, unspecified 4/30/2018    Hyperglycemia     Last Assessed:1/18/18     Patient Active Problem List   Diagnosis    Unilateral inguinal hernia without obstruction or gangrene    Inguinal hernia bilateral, non-recurrent    Anemia, unspecified    Monoclonal B-cell lymphocytosis    Fall    Rib pain on left side       Current Outpatient Prescriptions:     aspirin 81 mg chewable tablet, Chew as needed  , Disp: , Rfl:     Multiple Vitamin (MULTI VITAMIN MENS PO), Take 1 tablet by mouth daily, Disp: , Rfl:   No Known Allergies  Past Surgical History:   Procedure Laterality Date    WISDOM TOOTH EXTRACTION      Last Assessed:1/18/18     Social History     Objective:  Vitals:    06/21/18 1558   BP: 132/74   BP Location: Left arm   Patient Position: Sitting   Pulse: 93   Resp: 18   Temp: 97 9 °F (36 6 °C)   TempSrc: Tympanic   SpO2: 97%   Weight: 65 2 kg (143 lb 12 8 oz)   Height: 5' 7" (1 702 m)     Physical Exam   Constitutional: He is oriented to person, place, and time  He appears well-developed  HENT:   Head: Normocephalic  Eyes: Pupils are equal, round, and reactive to light  Neck: Neck supple  Cardiovascular: Normal rate and regular rhythm  No murmur heard  Pulmonary/Chest: Breath sounds normal  He has no wheezes  He has no rales  Abdominal: Soft  There is no tenderness  Musculoskeletal: Normal range of motion  He exhibits no edema or tenderness  Lymphadenopathy:     He has no cervical adenopathy  Neurological: He is alert and oriented to person, place, and time   He has normal reflexes  No cranial nerve deficit  Skin: No rash noted  No erythema  Psychiatric: He has a normal mood and affect  His behavior is normal          Labs: I personally reviewed the labs and imaging pertinent to this patient care

## 2018-06-21 NOTE — LETTER
June 21, 2018     Chris Nunez MD  [address] [id number] [address]    Patient: Claudia Craft   YOB: 1955   Date of Visit: 6/21/2018       Dear Dr Kia Richards: Thank you for referring Claudia Craft to me for evaluation  Below are my notes for this consultation  If you have questions, please do not hesitate to call me  I look forward to following your patient along with you  Sincerely,        Jayshree Rodríguez DO        CC: No Recipients  Jayshree Rodríguez DO  6/21/2018  5:58 PM  Sign at close encounter  [address] [address] [phone]    Barb Horn [id number]  06/21/18    Discussion:   In summary, this is a 30-year-old male history of recently detected Waldenstroms macroglobulinemia  Bone marrow biopsy is consistent with the diagnosis showing lymphoplasmacytic disorder with positive MYD88  Iron studies were intact  I reviewed with the patient and his brother that this is a malignancy of the immune system which is felt to be indolent though progressive  He is symptomatic with anemia at this time  Treatment is indicated, therefore  We reviewed that there are variety of treatment options that could be considered  Ibrutinib carries a category 1 recommendation by the NCCN  My inclination would be to proceed with this treatment at 280 mg p o  daily which would allow up or downward adjustment according to tolerance or/efficacy  We reviewed potential toxicities including but not limited to diarrhea, nausea, fatigue, cytopenias  Rare episodes of cardiac dysrhythmia or bleeding have been reported  Infections are also possible though not particularly common  Our chemotherapy nurse review the above information as well as providing written information in this regard  I discussed the above with the patient    The patient and his brother voiced understanding and agreement   ______________________________________________________________________    Chief Complaint   Patient presents with    Follow-up       HPI:   No history exists  Interval History:  Clinically stable  0 - Asymptomatic    Review of Systems   Constitutional: Negative for chills and fever  HENT: Negative for nosebleeds  Eyes: Negative for discharge  Respiratory: Negative for cough and shortness of breath  Cardiovascular: Negative for chest pain  Gastrointestinal: Negative for abdominal pain, constipation and diarrhea  Endocrine: Negative for polydipsia  Genitourinary: Negative for hematuria  Musculoskeletal: Negative for arthralgias  Skin: Negative for color change  Allergic/Immunologic: Negative for immunocompromised state  Neurological: Negative for dizziness and headaches  Hematological: Negative for adenopathy  Psychiatric/Behavioral: Negative for agitation         Past Medical History:   Diagnosis Date    Anemia, unspecified 4/30/2018    Hyperglycemia     Last Assessed:1/18/18     Patient Active Problem List   Diagnosis    Unilateral inguinal hernia without obstruction or gangrene    Inguinal hernia bilateral, non-recurrent    Anemia, unspecified    Monoclonal B-cell lymphocytosis    Fall    Rib pain on left side       Current Outpatient Prescriptions:     aspirin 81 mg chewable tablet, Chew as needed  , Disp: , Rfl:     Multiple Vitamin (MULTI VITAMIN MENS PO), Take 1 tablet by mouth daily, Disp: , Rfl:   No Known Allergies  Past Surgical History:   Procedure Laterality Date    WISDOM TOOTH EXTRACTION      Last Assessed:1/18/18     Social History     Objective:  Vitals:    06/21/18 1558   BP: 132/74   BP Location: Left arm   Patient Position: Sitting   Pulse: 93   Resp: 18   Temp: 97 9 °F (36 6 °C)   TempSrc: Tympanic   SpO2: 97%   Weight: 65 2 kg (143 lb 12 8 oz)   Height: 5' 7" (1 702 m)     Physical Exam   Constitutional: He is oriented to person, place, and time  He appears well-developed  HENT:   Head: Normocephalic  Eyes: Pupils are equal, round, and reactive to light  Neck: Neck supple  Cardiovascular: Normal rate and regular rhythm  No murmur heard  Pulmonary/Chest: Breath sounds normal  He has no wheezes  He has no rales  Abdominal: Soft  There is no tenderness  Musculoskeletal: Normal range of motion  He exhibits no edema or tenderness  Lymphadenopathy:     He has no cervical adenopathy  Neurological: He is alert and oriented to person, place, and time  He has normal reflexes  No cranial nerve deficit  Skin: No rash noted  No erythema  Psychiatric: He has a normal mood and affect  His behavior is normal          Labs: I personally reviewed the labs and imaging pertinent to this patient care

## 2018-06-22 LAB — SCAN RESULT: NORMAL

## 2018-06-27 ENCOUNTER — DOCUMENTATION (OUTPATIENT)
Dept: HEMATOLOGY ONCOLOGY | Facility: CLINIC | Age: 63
End: 2018-06-27

## 2018-06-27 ENCOUNTER — TELEPHONE (OUTPATIENT)
Dept: HEMATOLOGY ONCOLOGY | Facility: CLINIC | Age: 63
End: 2018-06-27

## 2018-06-27 NOTE — PROGRESS NOTES
Received notification from clinical on 6/25/2018 the pt will be starting Imbruvica  Sent form to  for signature, received auth form back, and submitted for auth      6/27/2018 received approval for the imbruvica  Per the approval letter the medication is approved starting 6/26/2018 through 12/26/2018  Called [facility] @ 11:30 ([phone]) spoke with isrrael who stated that the pt is not capitated  Notified clinical, financial, and [facility]  Requested the order be sent to [facility]  Called pt to inform him l/m for him to call back

## 2018-06-27 NOTE — TELEPHONE ENCOUNTER
Returned call to patient - he did speak with Juan Saavedra today  Patient is aware that script will be coming from [facility] - was transferred from [facility]    Patient will call with any additional questions or concerns

## 2018-07-02 ENCOUNTER — TELEPHONE (OUTPATIENT)
Dept: HEMATOLOGY ONCOLOGY | Facility: CLINIC | Age: 63
End: 2018-07-02

## 2018-07-19 ENCOUNTER — OFFICE VISIT (OUTPATIENT)
Dept: HEMATOLOGY ONCOLOGY | Facility: CLINIC | Age: 63
End: 2018-07-19
Payer: COMMERCIAL

## 2018-07-19 VITALS
TEMPERATURE: 97.5 F | HEIGHT: 67 IN | RESPIRATION RATE: 17 BRPM | DIASTOLIC BLOOD PRESSURE: 72 MMHG | WEIGHT: 140.6 LBS | SYSTOLIC BLOOD PRESSURE: 124 MMHG | HEART RATE: 68 BPM | OXYGEN SATURATION: 98 % | BODY MASS INDEX: 22.07 KG/M2

## 2018-07-19 DIAGNOSIS — C88.0 WALDENSTROM'S MACROGLOBULINEMIA (HCC): Primary | ICD-10-CM

## 2018-07-19 DIAGNOSIS — D72.820 MONOCLONAL B-CELL LYMPHOCYTOSIS: ICD-10-CM

## 2018-07-19 PROCEDURE — 99214 OFFICE O/P EST MOD 30 MIN: CPT | Performed by: INTERNAL MEDICINE

## 2018-07-19 NOTE — LETTER
July 19, 2018     Lanis Bamberger, MD  [address]    Patient: Sienna Dodson   YOB: 1955   Date of Visit: 7/19/2018       Dear Dr Trish Harrison: Thank you for referring Sienna Dodson to me for evaluation  Below are my notes for this consultation  If you have questions, please do not hesitate to call me  I look forward to following your patient along with you  Sincerely,        Jose Rafael Waggoner DO        CC: No Recipients  Jose Rafael Waggoner DO  7/19/2018  8:41 AM  Sign at close encounter  [address] [address]    Chelle Pardo, [id number]  07/19/18    Discussion:   In summary, this is a 72-year-old male history of recently diagnosed Waldenstroms  He is currently on Ibrutinib 280 mg p  o  daily  He is tolerating this without difficulty  He generally functions without restriction  He has no diarrhea, rash  Blood work to start next week on a Q 2 week basis  I will see him back in 1 month for review  Reassessment of IgG to be carried out approximately 2 months from now  I reviewed the above with the patient and his brother  They voiced understanding and agreement       ______________________________________________________________________    Chief Complaint   Patient presents with    Follow-up       HPI:     Waldenstrom's macroglobulinemia ([facility] [address] )    6/8/2018 Biopsy     Overall, the combination of morphologic and immunophenotypic features is consistent with 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 with small cell size and overall low grade appearance  The morphologic features and nonspecific immunophenotype are most consistent with marginal zone lymphoma or lymphoplasmacytic lymphoma   The presence of a monoclonal IgM expressing plasma cell population with expression of the same light chain as the malignant B-cell population in the setting of IgM monoclonal paraprotein may favor lymphoplasmacytic lymphoma, though is not definitively specific  MYD 88 +          6/21/2018 Initial Diagnosis     Waldenstrom's macroglobulinemia [facility])  April 2018 patient had hernia repair  Leukocytosis was noted  Peripheral blood flow cytometry showed findings consistent with monoclonal B-cell lymphocytosis  Additionally, he was found to have a kappa lambda ratio of 36 0  IgM 3700 with reciprocal inhibition  Skeletal survey showed no lytic bone foci  7/11/2018 -  Chemotherapy     Ibrutinib 280 mg p o  daily  Interval History:  Clinically stable  0 - Asymptomatic    Review of Systems   Constitutional: Negative for chills and fever  HENT: Negative for nosebleeds  Eyes: Negative for discharge  Respiratory: Negative for cough and shortness of breath  Cardiovascular: Negative for chest pain  Gastrointestinal: Negative for abdominal pain, constipation and diarrhea  Endocrine: Negative for polydipsia  Genitourinary: Negative for hematuria  Musculoskeletal: Negative for arthralgias  Skin: Negative for color change  Allergic/Immunologic: Negative for immunocompromised state  Neurological: Negative for dizziness and headaches  Hematological: Negative for adenopathy  Psychiatric/Behavioral: Negative for agitation         Past Medical History:   Diagnosis Date    Anemia, unspecified 4/30/2018    Hyperglycemia     Last Assessed:1/18/18    Waldenstrom's macroglobulinemia ([facility] [address] ) 6/21/2018     Patient Active Problem List   Diagnosis    Unilateral inguinal hernia without obstruction or gangrene    Inguinal hernia bilateral, non-recurrent    Anemia, unspecified    Monoclonal B-cell lymphocytosis    Fall    Rib pain on left side    Waldenstrom's macroglobulinemia ([facility] [address] )       Current Outpatient Prescriptions:     Ibrutinib 140 MG CAPS, Take 2 caps by mouth each day , Disp: 60 capsule, Rfl: 3    Multiple Vitamin (MULTI VITAMIN MENS PO), Take 1 tablet by mouth daily, Disp: , Rfl:   No Known Allergies  Past Surgical History:   Procedure Laterality Date    WISDOM TOOTH EXTRACTION      Last Assessed:1/18/18     Social History     Objective:  Vitals:    07/19/18 0748   BP: 124/72   BP Location: Left arm   Patient Position: Sitting   Pulse: 68   Resp: 17   Temp: 97 5 °F (36 4 °C)   TempSrc: Tympanic   SpO2: 98%   Weight: 63 8 kg (140 lb 9 6 oz)   Height: 5' 7" (1 702 m)     Physical Exam   Constitutional: He is oriented to person, place, and time  He appears well-developed  HENT:   Head: Normocephalic  Eyes: Pupils are equal, round, and reactive to light  Neck: Neck supple  Cardiovascular: Normal rate and regular rhythm  No murmur heard  Pulmonary/Chest: Breath sounds normal  He has no wheezes  He has no rales  Abdominal: Soft  There is no tenderness  Musculoskeletal: Normal range of motion  He exhibits no edema or tenderness  Lymphadenopathy:     He has no cervical adenopathy  Neurological: He is alert and oriented to person, place, and time  He has normal reflexes  No cranial nerve deficit  Skin: No rash noted  No erythema  Psychiatric: He has a normal mood and affect  His behavior is normal          Labs: I personally reviewed the labs and imaging pertinent to this patient care

## 2018-07-19 NOTE — PROGRESS NOTES
[id number] [facility]  HEMATOLOGY [facility]COL[facility] SPECIALISTS [address] [place] [address]    Sudhir Foster [id number],[birth year], [id number]  07/19/18    Discussion:   In summary, this is a 80-year-old male history of recently diagnosed Waldenstroms  He is currently on Ibrutinib 280 mg p  o  daily  He is tolerating this without difficulty  He generally functions without restriction  He has no diarrhea, rash  Blood work to start next week on a Q 2 week basis  I will see him back in 1 month for review  Reassessment of IgG to be carried out approximately 2 months from now  I reviewed the above with the patient and his brother  They voiced understanding and agreement       ______________________________________________________________________    Chief Complaint   Patient presents with    Follow-up       HPI:     Waldenstrom's macroglobulinemia ([facility] [address] )    6/8/2018 Biopsy     Overall, the combination of morphologic and immunophenotypic features is consistent with 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 with small cell size and overall low grade appearance  The morphologic features and nonspecific immunophenotype are most consistent with marginal zone lymphoma or lymphoplasmacytic lymphoma  The presence of a monoclonal IgM expressing plasma cell population with expression of the same light chain as the malignant B-cell population in the setting of IgM monoclonal paraprotein may favor lymphoplasmacytic lymphoma, though is not definitively specific  MYD 88 +          6/21/2018 Initial Diagnosis     Waldenstrom's macroglobulinemia [facility])  April 2018 patient had hernia repair  Leukocytosis was noted  Peripheral blood flow cytometry showed findings consistent with monoclonal B-cell lymphocytosis  Additionally, he was found to have a kappa lambda ratio of 36 0  IgM 3700 with reciprocal inhibition  Skeletal survey showed no lytic bone foci           7/11/2018 -  Chemotherapy     Ibrutinib 280 mg p o  daily  Interval History:  Clinically stable  0 - Asymptomatic    Review of Systems   Constitutional: Negative for chills and fever  HENT: Negative for nosebleeds  Eyes: Negative for discharge  Respiratory: Negative for cough and shortness of breath  Cardiovascular: Negative for chest pain  Gastrointestinal: Negative for abdominal pain, constipation and diarrhea  Endocrine: Negative for polydipsia  Genitourinary: Negative for hematuria  Musculoskeletal: Negative for arthralgias  Skin: Negative for color change  Allergic/Immunologic: Negative for immunocompromised state  Neurological: Negative for dizziness and headaches  Hematological: Negative for adenopathy  Psychiatric/Behavioral: Negative for agitation  Past Medical History:   Diagnosis Date    Anemia, unspecified 4/30/2018    Hyperglycemia     Last Assessed:1/18/18    Waldenstrom's macroglobulinemia ([facility] [address] ) 6/21/2018     Patient Active Problem List   Diagnosis    Unilateral inguinal hernia without obstruction or gangrene    Inguinal hernia bilateral, non-recurrent    Anemia, unspecified    Monoclonal B-cell lymphocytosis    Fall    Rib pain on left side    Waldenstrom's macroglobulinemia ([facility] [address] )       Current Outpatient Prescriptions:     Ibrutinib 140 MG CAPS, Take 2 caps by mouth each day , Disp: 60 capsule, Rfl: 3    Multiple Vitamin (MULTI VITAMIN MENS PO), Take 1 tablet by mouth daily, Disp: , Rfl:   No Known Allergies  Past Surgical History:   Procedure Laterality Date    WISDOM TOOTH EXTRACTION      Last Assessed:1/18/18     Social History     Objective:  Vitals:    07/19/18 0748   BP: 124/72   BP Location: Left arm   Patient Position: Sitting   Pulse: 68   Resp: 17   Temp: 97 5 °F (36 4 °C)   TempSrc: Tympanic   SpO2: 98%   Weight: 63 8 kg (140 lb 9 6 oz)   Height: 5' 7" (1 702 m)     Physical Exam   Constitutional: He is oriented to person, place, and time  He appears well-developed     HENT: Head: Normocephalic  Eyes: Pupils are equal, round, and reactive to light  Neck: Neck supple  Cardiovascular: Normal rate and regular rhythm  No murmur heard  Pulmonary/Chest: Breath sounds normal  He has no wheezes  He has no rales  Abdominal: Soft  There is no tenderness  Musculoskeletal: Normal range of motion  He exhibits no edema or tenderness  Lymphadenopathy:     He has no cervical adenopathy  Neurological: He is alert and oriented to person, place, and time  He has normal reflexes  No cranial nerve deficit  Skin: No rash noted  No erythema  Psychiatric: He has a normal mood and affect  His behavior is normal          Labs: I personally reviewed the labs and imaging pertinent to this patient care

## 2018-07-25 ENCOUNTER — APPOINTMENT (OUTPATIENT)
Dept: LAB | Facility: CLINIC | Age: 63
End: 2018-07-25
Payer: COMMERCIAL

## 2018-07-25 ENCOUNTER — TRANSCRIBE ORDERS (OUTPATIENT)
Dept: LAB | Facility: CLINIC | Age: 63
End: 2018-07-25

## 2018-07-25 LAB
ALBUMIN SERPL BCP-MCNC: 3.2 G/DL (ref 3.5–5)
ALP SERPL-CCNC: 85 U/L (ref 46–116)
ALT SERPL W P-5'-P-CCNC: 20 U/L (ref 12–78)
ANION GAP SERPL CALCULATED.3IONS-SCNC: 7 MMOL/L (ref 4–13)
AST SERPL W P-5'-P-CCNC: 15 U/L (ref 5–45)
BASOPHILS # BLD AUTO: 0.04 THOUSANDS/ΜL (ref 0–0.1)
BASOPHILS NFR BLD AUTO: 1 % (ref 0–1)
BILIRUB SERPL-MCNC: 0.3 MG/DL (ref 0.2–1)
BUN SERPL-MCNC: 11 MG/DL (ref 5–25)
CALCIUM SERPL-MCNC: 9 MG/DL (ref 8.3–10.1)
CHLORIDE SERPL-SCNC: 106 MMOL/L (ref 100–108)
CO2 SERPL-SCNC: 25 MMOL/L (ref 21–32)
CREAT SERPL-MCNC: 0.94 MG/DL (ref 0.6–1.3)
EOSINOPHIL # BLD AUTO: 0.1 THOUSAND/ΜL (ref 0–0.61)
EOSINOPHIL NFR BLD AUTO: 2 % (ref 0–6)
ERYTHROCYTE [DISTWIDTH] IN BLOOD BY AUTOMATED COUNT: 17.2 % (ref 11.6–15.1)
GFR SERPL CREATININE-BSD FRML MDRD: 88 ML/MIN/1.73SQ M
GLUCOSE SERPL-MCNC: 109 MG/DL (ref 65–140)
HCT VFR BLD AUTO: 29.4 % (ref 36.5–49.3)
HGB BLD-MCNC: 9.1 G/DL (ref 12–17)
IMM GRANULOCYTES # BLD AUTO: 0.05 THOUSAND/UL (ref 0–0.2)
IMM GRANULOCYTES NFR BLD AUTO: 1 % (ref 0–2)
LYMPHOCYTES # BLD AUTO: 1.95 THOUSANDS/ΜL (ref 0.6–4.47)
LYMPHOCYTES NFR BLD AUTO: 42 % (ref 14–44)
MCH RBC QN AUTO: 29 PG (ref 26.8–34.3)
MCHC RBC AUTO-ENTMCNC: 31 G/DL (ref 31.4–37.4)
MCV RBC AUTO: 94 FL (ref 82–98)
MONOCYTES # BLD AUTO: 0.35 THOUSAND/ΜL (ref 0.17–1.22)
MONOCYTES NFR BLD AUTO: 8 % (ref 4–12)
NEUTROPHILS # BLD AUTO: 2.19 THOUSANDS/ΜL (ref 1.85–7.62)
NEUTS SEG NFR BLD AUTO: 46 % (ref 43–75)
NRBC BLD AUTO-RTO: 0 /100 WBCS
PLATELET # BLD AUTO: 226 THOUSANDS/UL (ref 149–390)
PMV BLD AUTO: 9.8 FL (ref 8.9–12.7)
POTASSIUM SERPL-SCNC: 3.7 MMOL/L (ref 3.5–5.3)
PROT SERPL-MCNC: 8.4 G/DL (ref 6.4–8.2)
RBC # BLD AUTO: 3.14 MILLION/UL (ref 3.88–5.62)
SODIUM SERPL-SCNC: 138 MMOL/L (ref 136–145)
WBC # BLD AUTO: 4.68 THOUSAND/UL (ref 4.31–10.16)

## 2018-07-25 PROCEDURE — 36415 COLL VENOUS BLD VENIPUNCTURE: CPT | Performed by: INTERNAL MEDICINE

## 2018-07-25 PROCEDURE — 85025 COMPLETE CBC W/AUTO DIFF WBC: CPT | Performed by: INTERNAL MEDICINE

## 2018-07-25 PROCEDURE — 80053 COMPREHEN METABOLIC PANEL: CPT | Performed by: INTERNAL MEDICINE

## 2018-08-08 ENCOUNTER — APPOINTMENT (OUTPATIENT)
Dept: LAB | Facility: CLINIC | Age: 63
End: 2018-08-08
Payer: COMMERCIAL

## 2018-08-16 ENCOUNTER — OFFICE VISIT (OUTPATIENT)
Dept: HEMATOLOGY ONCOLOGY | Facility: CLINIC | Age: 63
End: 2018-08-16
Payer: COMMERCIAL

## 2018-08-16 VITALS
RESPIRATION RATE: 17 BRPM | OXYGEN SATURATION: 98 % | TEMPERATURE: 98 F | BODY MASS INDEX: 23.1 KG/M2 | HEART RATE: 75 BPM | DIASTOLIC BLOOD PRESSURE: 72 MMHG | SYSTOLIC BLOOD PRESSURE: 122 MMHG | WEIGHT: 147.2 LBS | HEIGHT: 67 IN

## 2018-08-16 DIAGNOSIS — C88.0 WALDENSTROM'S MACROGLOBULINEMIA (HCC): Primary | ICD-10-CM

## 2018-08-16 PROCEDURE — 99214 OFFICE O/P EST MOD 30 MIN: CPT | Performed by: INTERNAL MEDICINE

## 2018-08-16 NOTE — PROGRESS NOTES
[address] [facility] HEMATOLOGY ONCOLOGY Isabel Benavides  [address] [facility] [address] [place] [id number] [phone]    Nettie Schumacher [id number]  08/16/18    Discussion:   In summary, this is an 44-year-old male history of Waldenstroms  He is currently on Ibrutinib  He is tolerating this without difficulty  He states that his energy level has improved as has his appetite  He has gained 5-7 lb over the past few months  Hemoglobin has increased to 9 7, prior 9 0  White count and platelets are normal   Chemistry normal   Interestingly, total protein has declined, albumin is stable  I reviewed the above findings and their significance with the patient and his brother  I am fairly confident that his mood Waldenstroms is responding by indirect signals  Reassessment with paraprotein is requested just prior to his next visit in 6 weeks  I discussed the above with the patient  The patient and his brother voiced understanding and agreement   ______________________________________________________________________    Chief Complaint   Patient presents with    Follow-up       HPI:     Waldenstrom's macroglobulinemia ([facility] [address] )    6/8/2018 Biopsy     Overall, the combination of morphologic and immunophenotypic features is consistent with 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 with small cell size and overall low grade appearance  The morphologic features and nonspecific immunophenotype are most consistent with marginal zone lymphoma or lymphoplasmacytic lymphoma  The presence of a monoclonal IgM expressing plasma cell population with expression of the same light chain as the malignant B-cell population in the setting of IgM monoclonal paraprotein may favor lymphoplasmacytic lymphoma, though is not definitively specific  MYD 88 +          6/21/2018 Initial Diagnosis     Waldenstrom's macroglobulinemia [facility])  April 2018 patient had hernia repair  Leukocytosis was noted    Peripheral blood flow cytometry showed findings consistent with monoclonal B-cell lymphocytosis  Additionally, he was found to have a kappa lambda ratio of 36 0  IgM 3700 with reciprocal inhibition  Skeletal survey showed no lytic bone foci  7/11/2018 -  Chemotherapy     Ibrutinib 280 mg p o  daily  Interval History:  Clinically stable  1 - Symptomatic but completely ambulatory    Review of Systems   Constitutional: Negative for chills and fever  HENT: Negative for nosebleeds  Eyes: Negative for discharge  Respiratory: Negative for cough and shortness of breath  Cardiovascular: Negative for chest pain  Gastrointestinal: Negative for abdominal pain, constipation and diarrhea  Endocrine: Negative for polydipsia  Genitourinary: Negative for hematuria  Musculoskeletal: Negative for arthralgias  Skin: Negative for color change  Allergic/Immunologic: Negative for immunocompromised state  Neurological: Negative for dizziness and headaches  Hematological: Negative for adenopathy  Psychiatric/Behavioral: Negative for agitation         Past Medical History:   Diagnosis Date    Anemia, unspecified 4/30/2018    Hyperglycemia     Last Assessed:1/18/18    Waldenstrom's macroglobulinemia ([facility] [address] ) 6/21/2018     Patient Active Problem List   Diagnosis    Unilateral inguinal hernia without obstruction or gangrene    Inguinal hernia bilateral, non-recurrent    Anemia, unspecified    Monoclonal B-cell lymphocytosis    Fall    Rib pain on left side    Waldenstrom's macroglobulinemia ([facility] [address] )       Current Outpatient Prescriptions:     Ibrutinib 140 MG CAPS, Take 2 caps by mouth each day , Disp: 60 capsule, Rfl: 3    Multiple Vitamin (MULTI VITAMIN MENS PO), Take 1 tablet by mouth daily, Disp: , Rfl:   No Known Allergies  Past Surgical History:   Procedure Laterality Date    WISDOM TOOTH EXTRACTION      Last Assessed:1/18/18     Social History     Objective:  Vitals:    08/16/18 1101   BP: 122/72   BP Location: Left arm   Patient Position: Sitting   Pulse: 75   Resp: 17   Temp: 98 °F (36 7 °C)   TempSrc: Tympanic   SpO2: 98%   Weight: 66 8 kg (147 lb 3 2 oz)   Height: 5' 7" (1 702 m)     Physical Exam   Constitutional: He is oriented to person, place, and time  He appears well-developed  HENT:   Head: Normocephalic  Eyes: Pupils are equal, round, and reactive to light  Neck: Neck supple  Cardiovascular: Normal rate and regular rhythm  No murmur heard  Pulmonary/Chest: Breath sounds normal  He has no wheezes  He has no rales  Abdominal: Soft  There is no tenderness  Musculoskeletal: Normal range of motion  He exhibits no edema or tenderness  Lymphadenopathy:     He has no cervical adenopathy  Neurological: He is alert and oriented to person, place, and time  He has normal reflexes  No cranial nerve deficit  Skin: No rash noted  No erythema  Psychiatric: He has a normal mood and affect  His behavior is normal          Labs: I personally reviewed the labs and imaging pertinent to this patient care

## 2018-09-17 ENCOUNTER — TELEPHONE (OUTPATIENT)
Dept: HEMATOLOGY ONCOLOGY | Facility: CLINIC | Age: 63
End: 2018-09-17

## 2018-09-17 ENCOUNTER — APPOINTMENT (OUTPATIENT)
Dept: LAB | Facility: CLINIC | Age: 63
End: 2018-09-17
Payer: COMMERCIAL

## 2018-09-17 DIAGNOSIS — C88.0 WALDENSTROM'S MACROGLOBULINEMIA (HCC): ICD-10-CM

## 2018-09-17 LAB
IGA SERPL-MCNC: 25 MG/DL (ref 70–400)
IGG SERPL-MCNC: 373 MG/DL (ref 700–1600)
IGM SERPL-MCNC: 421 MG/DL (ref 40–230)

## 2018-09-17 PROCEDURE — 83883 ASSAY NEPHELOMETRY NOT SPEC: CPT

## 2018-09-17 PROCEDURE — 82784 ASSAY IGA/IGD/IGG/IGM EACH: CPT

## 2018-09-17 NOTE — TELEPHONE ENCOUNTER
Pt called to verify appt for next week  It is 9/27/18   Also asked about fasting for CMP and informed does not need to fast

## 2018-09-18 LAB
KAPPA LC FREE SER-MCNC: 18.8 MG/L (ref 3.3–19.4)
KAPPA LC FREE/LAMBDA FREE SER: 5.88 {RATIO} (ref 0.26–1.65)
LAMBDA LC FREE SERPL-MCNC: 3.2 MG/L (ref 5.7–26.3)

## 2018-09-27 ENCOUNTER — OFFICE VISIT (OUTPATIENT)
Dept: HEMATOLOGY ONCOLOGY | Facility: CLINIC | Age: 63
End: 2018-09-27
Payer: COMMERCIAL

## 2018-09-27 VITALS
OXYGEN SATURATION: 97 % | SYSTOLIC BLOOD PRESSURE: 132 MMHG | BODY MASS INDEX: 23.75 KG/M2 | WEIGHT: 151.3 LBS | TEMPERATURE: 98.1 F | HEART RATE: 70 BPM | DIASTOLIC BLOOD PRESSURE: 74 MMHG | RESPIRATION RATE: 18 BRPM | HEIGHT: 67 IN

## 2018-09-27 DIAGNOSIS — C88.0 WALDENSTROM'S MACROGLOBULINEMIA (HCC): Primary | ICD-10-CM

## 2018-09-27 PROCEDURE — 99214 OFFICE O/P EST MOD 30 MIN: CPT | Performed by: INTERNAL MEDICINE

## 2018-09-27 NOTE — LETTER
September 27, 2018     Dereje Mejia MD  [address] [place] [address]    Patient: Addie Epperson   YOB: 1955   Date of Visit: 9/27/2018       Dear Dr Neva Bojorquez:    Thank you for referring Addie Epperson to me for evaluation  Below are my notes for this consultation  If you have questions, please do not hesitate to call me  I look forward to following your patient along with you  Sincerely,        Gunnar Prieto DO        CC: No Recipients  Gunnar Prieto DO  9/27/2018 10:29 AM  Sign at close encounter  [address]  [address] [address]  [phone]    Casie [facility] [id number]  09/27/18    Discussion: This is a 71-year-old male history of Waldenstroms  Clinically he is doing quite well  He is on Ibrutinib 280 mg p o  daily  He has very minor heartburn occasionally  He is taking no extra medications for this  His functional status is excellent  Recent IgM has decreased to 420, from 3700  Hemoglobin has increased to 11 7, free light chain ratio has decreased to 5 8, previously 36  By any measure he is responding to his treatment  Continuation was recommended  The flu vaccine was suggested  I discussed the above with the patient  The patient and his brother voiced understanding and agreement   ______________________________________________________________________    Chief Complaint   Patient presents with    Follow-up       HPI:     Waldenstrom's macroglobulinemia ([facility] [address] )    6/8/2018 Biopsy     Overall, the combination of morphologic and immunophenotypic features is consistent with 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 with small cell size and overall low grade appearance  The morphologic features and nonspecific immunophenotype are most consistent with marginal zone lymphoma or lymphoplasmacytic lymphoma   The presence of a monoclonal IgM expressing plasma cell population with expression of the same light chain as the malignant B-cell population in the setting of IgM monoclonal paraprotein may favor lymphoplasmacytic lymphoma, though is not definitively specific  MYD 88 +          6/21/2018 Initial Diagnosis     Waldenstrom's macroglobulinemia [facility])  April 2018 patient had hernia repair  Leukocytosis was noted  Peripheral blood flow cytometry showed findings consistent with monoclonal B-cell lymphocytosis  Additionally, he was found to have a kappa lambda ratio of 36 0  IgM 3700 with reciprocal inhibition  Skeletal survey showed no lytic bone foci  7/11/2018 -  Chemotherapy     Ibrutinib 280 mg p o  daily  Interval History:  Clinically stable  0 - Asymptomatic    Review of Systems   Constitutional: Negative for chills and fever  HENT: Negative for nosebleeds  Eyes: Negative for discharge  Respiratory: Negative for cough and shortness of breath  Cardiovascular: Negative for chest pain  Gastrointestinal: Negative for abdominal pain, constipation and diarrhea  Endocrine: Negative for polydipsia  Genitourinary: Negative for hematuria  Musculoskeletal: Negative for arthralgias  Skin: Negative for color change  Allergic/Immunologic: Negative for immunocompromised state  Neurological: Negative for dizziness and headaches  Hematological: Negative for adenopathy  Psychiatric/Behavioral: Negative for agitation         Past Medical History:   Diagnosis Date    Anemia, unspecified 4/30/2018    Hyperglycemia     Last Assessed:1/18/18    Waldenstrom's macroglobulinemia ([facility] [address] ) 6/21/2018     Patient Active Problem List   Diagnosis    Unilateral inguinal hernia without obstruction or gangrene    Inguinal hernia bilateral, non-recurrent    Anemia, unspecified    Monoclonal B-cell lymphocytosis    Fall    Rib pain on left side    Waldenstrom's macroglobulinemia ([facility] [address] )       Current Outpatient Prescriptions:     Ibrutinib 140 MG CAPS, Take 2 caps by mouth each day , Disp: 60 capsule, Rfl: 3    Multiple Vitamin (MULTI VITAMIN MENS PO), Take 1 tablet by mouth daily, Disp: , Rfl:   No Known Allergies  Past Surgical History:   Procedure Laterality Date    WISDOM TOOTH EXTRACTION      Last Assessed:1/18/18     Social History     Objective:  Vitals:    09/27/18 0956   BP: 132/74   BP Location: Right arm   Patient Position: Sitting   Pulse: 70   Resp: 18   Temp: 98 1 °F (36 7 °C)   TempSrc: Tympanic   SpO2: 97%   Weight: 68 6 kg (151 lb 4 8 oz)   Height: 5' 7" (1 702 m)     Physical Exam   Constitutional: He is oriented to person, place, and time  He appears well-developed  HENT:   Head: Normocephalic  Eyes: Pupils are equal, round, and reactive to light  Neck: Neck supple  Cardiovascular: Normal rate and regular rhythm  No murmur heard  Pulmonary/Chest: Breath sounds normal  He has no wheezes  He has no rales  Abdominal: Soft  There is no tenderness  Musculoskeletal: Normal range of motion  He exhibits no edema or tenderness  Lymphadenopathy:     He has no cervical adenopathy  Neurological: He is alert and oriented to person, place, and time  He has normal reflexes  No cranial nerve deficit  Skin: No rash noted  No erythema  Psychiatric: He has a normal mood and affect  His behavior is normal          Labs: I personally reviewed the labs and imaging pertinent to this patient care

## 2018-09-27 NOTE — PROGRESS NOTES
[address] [facility]  [address] [facility] [address]  [address] [address] [phone]    Aydin Grayson, [id number]  09/27/18    Discussion: This is a 73-year-old male history of Waldenstroms  Clinically he is doing quite well  He is on Ibrutinib 280 mg p o  daily  He has very minor heartburn occasionally  He is taking no extra medications for this  His functional status is excellent  Recent IgM has decreased to 420, from 3700  Hemoglobin has increased to 11 7, free light chain ratio has decreased to 5 8, previously 36  By any measure he is responding to his treatment  Continuation was recommended  The flu vaccine was suggested  I discussed the above with the patient  The patient and his brother voiced understanding and agreement   ______________________________________________________________________    Chief Complaint   Patient presents with    Follow-up       HPI:     Waldenstrom's macroglobulinemia ([facility] [address] )    6/8/2018 Biopsy     Overall, the combination of morphologic and immunophenotypic features is consistent with 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 with small cell size and overall low grade appearance  The morphologic features and nonspecific immunophenotype are most consistent with marginal zone lymphoma or lymphoplasmacytic lymphoma  The presence of a monoclonal IgM expressing plasma cell population with expression of the same light chain as the malignant B-cell population in the setting of IgM monoclonal paraprotein may favor lymphoplasmacytic lymphoma, though is not definitively specific  MYD 88 +          6/21/2018 Initial Diagnosis     Waldenstrom's macroglobulinemia [facility])  April 2018 patient had hernia repair  Leukocytosis was noted  Peripheral blood flow cytometry showed findings consistent with monoclonal B-cell lymphocytosis  Additionally, he was found to have a kappa lambda ratio of 36 0    IgM 3700 with reciprocal inhibition  Skeletal survey showed no lytic bone foci  7/11/2018 -  Chemotherapy     Ibrutinib 280 mg p o  daily  Interval History:  Clinically stable  0 - Asymptomatic    Review of Systems   Constitutional: Negative for chills and fever  HENT: Negative for nosebleeds  Eyes: Negative for discharge  Respiratory: Negative for cough and shortness of breath  Cardiovascular: Negative for chest pain  Gastrointestinal: Negative for abdominal pain, constipation and diarrhea  Endocrine: Negative for polydipsia  Genitourinary: Negative for hematuria  Musculoskeletal: Negative for arthralgias  Skin: Negative for color change  Allergic/Immunologic: Negative for immunocompromised state  Neurological: Negative for dizziness and headaches  Hematological: Negative for adenopathy  Psychiatric/Behavioral: Negative for agitation         Past Medical History:   Diagnosis Date    Anemia, unspecified 4/30/2018    Hyperglycemia     Last Assessed:1/18/18    Waldenstrom's macroglobulinemia ([facility] ) 6/21/2018     Patient Active Problem List   Diagnosis    Unilateral inguinal hernia without obstruction or gangrene    Inguinal hernia bilateral, non-recurrent    Anemia, unspecified    Monoclonal B-cell lymphocytosis    Fall    Rib pain on left side    Waldenstrom's macroglobulinemia ([facility] )       Current Outpatient Prescriptions:     Ibrutinib 140 MG CAPS, Take 2 caps by mouth each day , Disp: 60 capsule, Rfl: 3    Multiple Vitamin (MULTI VITAMIN MENS PO), Take 1 tablet by mouth daily, Disp: , Rfl:   No Known Allergies  Past Surgical History:   Procedure Laterality Date    WISDOM TOOTH EXTRACTION      Last Assessed:1/18/18     Social History     Objective:  Vitals:    09/27/18 0956   BP: 132/74   BP Location: Right arm   Patient Position: Sitting   Pulse: 70   Resp: 18   Temp: 98 1 °F (36 7 °C)   TempSrc: Tympanic   SpO2: 97%   Weight: 68 6 kg (151 lb 4 8 oz)   Height: 5' 7" (1 702 m)     Physical Exam   Constitutional: He is oriented to person, place, and time  He appears well-developed  HENT:   Head: Normocephalic  Eyes: Pupils are equal, round, and reactive to light  Neck: Neck supple  Cardiovascular: Normal rate and regular rhythm  No murmur heard  Pulmonary/Chest: Breath sounds normal  He has no wheezes  He has no rales  Abdominal: Soft  There is no tenderness  Musculoskeletal: Normal range of motion  He exhibits no edema or tenderness  Lymphadenopathy:     He has no cervical adenopathy  Neurological: He is alert and oriented to person, place, and time  He has normal reflexes  No cranial nerve deficit  Skin: No rash noted  No erythema  Psychiatric: He has a normal mood and affect  His behavior is normal          Labs: I personally reviewed the labs and imaging pertinent to this patient care

## 2018-10-17 DIAGNOSIS — C88.0 WALDENSTROM'S MACROGLOBULINEMIA (HCC): ICD-10-CM

## 2018-11-05 ENCOUNTER — IMMUNIZATION (OUTPATIENT)
Dept: INTERNAL MEDICINE CLINIC | Facility: CLINIC | Age: 63
End: 2018-11-05
Payer: COMMERCIAL

## 2018-11-05 DIAGNOSIS — Z23 ENCOUNTER FOR IMMUNIZATION: ICD-10-CM

## 2018-11-05 PROCEDURE — 90686 IIV4 VACC NO PRSV 0.5 ML IM: CPT

## 2018-11-05 PROCEDURE — 90471 IMMUNIZATION ADMIN: CPT

## 2018-11-19 ENCOUNTER — APPOINTMENT (OUTPATIENT)
Dept: LAB | Facility: CLINIC | Age: 63
End: 2018-11-19
Payer: COMMERCIAL

## 2018-11-19 DIAGNOSIS — C88.0 WALDENSTROM'S MACROGLOBULINEMIA (HCC): ICD-10-CM

## 2018-11-19 LAB
ALBUMIN SERPL BCP-MCNC: 3.8 G/DL (ref 3.5–5)
ALP SERPL-CCNC: 100 U/L (ref 46–116)
ALT SERPL W P-5'-P-CCNC: 31 U/L (ref 12–78)
ANION GAP SERPL CALCULATED.3IONS-SCNC: 5 MMOL/L (ref 4–13)
AST SERPL W P-5'-P-CCNC: 22 U/L (ref 5–45)
BASOPHILS # BLD AUTO: 0.06 THOUSANDS/ΜL (ref 0–0.1)
BASOPHILS NFR BLD AUTO: 1 % (ref 0–1)
BILIRUB SERPL-MCNC: 0.6 MG/DL (ref 0.2–1)
BUN SERPL-MCNC: 10 MG/DL (ref 5–25)
CALCIUM SERPL-MCNC: 8.5 MG/DL (ref 8.3–10.1)
CHLORIDE SERPL-SCNC: 104 MMOL/L (ref 100–108)
CO2 SERPL-SCNC: 26 MMOL/L (ref 21–32)
CREAT SERPL-MCNC: 1.01 MG/DL (ref 0.6–1.3)
EOSINOPHIL # BLD AUTO: 0.04 THOUSAND/ΜL (ref 0–0.61)
EOSINOPHIL NFR BLD AUTO: 1 % (ref 0–6)
ERYTHROCYTE [DISTWIDTH] IN BLOOD BY AUTOMATED COUNT: 13.1 % (ref 11.6–15.1)
GFR SERPL CREATININE-BSD FRML MDRD: 80 ML/MIN/1.73SQ M
GLUCOSE SERPL-MCNC: 129 MG/DL (ref 65–140)
HCT VFR BLD AUTO: 39.2 % (ref 36.5–49.3)
HGB BLD-MCNC: 13 G/DL (ref 12–17)
IGA SERPL-MCNC: 27 MG/DL (ref 70–400)
IGG SERPL-MCNC: 412 MG/DL (ref 700–1600)
IGM SERPL-MCNC: 266 MG/DL (ref 40–230)
IMM GRANULOCYTES # BLD AUTO: 0.02 THOUSAND/UL (ref 0–0.2)
IMM GRANULOCYTES NFR BLD AUTO: 0 % (ref 0–2)
LYMPHOCYTES # BLD AUTO: 1.36 THOUSANDS/ΜL (ref 0.6–4.47)
LYMPHOCYTES NFR BLD AUTO: 28 % (ref 14–44)
MCH RBC QN AUTO: 29.3 PG (ref 26.8–34.3)
MCHC RBC AUTO-ENTMCNC: 33.2 G/DL (ref 31.4–37.4)
MCV RBC AUTO: 89 FL (ref 82–98)
MONOCYTES # BLD AUTO: 0.45 THOUSAND/ΜL (ref 0.17–1.22)
MONOCYTES NFR BLD AUTO: 9 % (ref 4–12)
NEUTROPHILS # BLD AUTO: 2.98 THOUSANDS/ΜL (ref 1.85–7.62)
NEUTS SEG NFR BLD AUTO: 61 % (ref 43–75)
NRBC BLD AUTO-RTO: 0 /100 WBCS
PLATELET # BLD AUTO: 212 THOUSANDS/UL (ref 149–390)
PMV BLD AUTO: 10.4 FL (ref 8.9–12.7)
POTASSIUM SERPL-SCNC: 3.7 MMOL/L (ref 3.5–5.3)
PROT SERPL-MCNC: 6.7 G/DL (ref 6.4–8.2)
RBC # BLD AUTO: 4.43 MILLION/UL (ref 3.88–5.62)
SODIUM SERPL-SCNC: 135 MMOL/L (ref 136–145)
WBC # BLD AUTO: 4.91 THOUSAND/UL (ref 4.31–10.16)

## 2018-11-19 PROCEDURE — 82784 ASSAY IGA/IGD/IGG/IGM EACH: CPT

## 2018-11-19 PROCEDURE — 36415 COLL VENOUS BLD VENIPUNCTURE: CPT

## 2018-11-19 PROCEDURE — 80053 COMPREHEN METABOLIC PANEL: CPT

## 2018-11-19 PROCEDURE — 83883 ASSAY NEPHELOMETRY NOT SPEC: CPT

## 2018-11-19 PROCEDURE — 85025 COMPLETE CBC W/AUTO DIFF WBC: CPT

## 2018-11-20 LAB
KAPPA LC FREE SER-MCNC: 17.5 MG/L (ref 3.3–19.4)
KAPPA LC FREE/LAMBDA FREE SER: 2.73 {RATIO} (ref 0.26–1.65)
LAMBDA LC FREE SERPL-MCNC: 6.4 MG/L (ref 5.7–26.3)

## 2018-11-26 ENCOUNTER — OFFICE VISIT (OUTPATIENT)
Dept: HEMATOLOGY ONCOLOGY | Facility: CLINIC | Age: 63
End: 2018-11-26
Payer: COMMERCIAL

## 2018-11-26 VITALS
TEMPERATURE: 98.3 F | RESPIRATION RATE: 16 BRPM | OXYGEN SATURATION: 96 % | DIASTOLIC BLOOD PRESSURE: 78 MMHG | BODY MASS INDEX: 23.49 KG/M2 | HEART RATE: 83 BPM | WEIGHT: 155 LBS | HEIGHT: 68 IN | SYSTOLIC BLOOD PRESSURE: 122 MMHG

## 2018-11-26 DIAGNOSIS — C88.0 WALDENSTROM'S MACROGLOBULINEMIA (HCC): Primary | ICD-10-CM

## 2018-11-26 PROCEDURE — 99214 OFFICE O/P EST MOD 30 MIN: CPT | Performed by: INTERNAL MEDICINE

## 2018-11-26 NOTE — PROGRESS NOTES
[address] HEMATOLOGY ONCOLOGY [facility]  [address] [facility] [address] [place] [id number] [phone]    Jacob Garnica [id number]  11/26/18    Discussion:   In summary, this is a 70-year-old male history of Waldenstroms  Clinically he is doing well  He generally functions without restriction  He is tolerating Ibrutinib without difficulty  Recent CBC and CMP are normal   IgM level continues to gradually decrease, approaching normal   Free light chains likewise  There is no physical exam evidence of progressive lymphoproliferative disorder  Monitoring and continue treatment are appropriate  I discussed the above with the patient  The patient and his brother voiced understanding and agreement   ______________________________________________________________________    Chief Complaint   Patient presents with    Follow-up       HPI:     Waldenstrom's macroglobulinemia ([facility] [address] )    6/8/2018 Biopsy     Overall, the combination of morphologic and immunophenotypic features is consistent with 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 with small cell size and overall low grade appearance  The morphologic features and nonspecific immunophenotype are most consistent with marginal zone lymphoma or lymphoplasmacytic lymphoma  The presence of a monoclonal IgM expressing plasma cell population with expression of the same light chain as the malignant B-cell population in the setting of IgM monoclonal paraprotein may favor lymphoplasmacytic lymphoma, though is not definitively specific  MYD 88 +          6/21/2018 Initial Diagnosis     Waldenstrom's macroglobulinemia [facility])  April 2018 patient had hernia repair  Leukocytosis was noted  Peripheral blood flow cytometry showed findings consistent with monoclonal B-cell lymphocytosis  Additionally, he was found to have a kappa lambda ratio of 36 0  IgM 3700 with reciprocal inhibition  Skeletal survey showed no lytic bone foci  7/11/2018 -  Chemotherapy     Ibrutinib 280 mg p o  daily  Interval History:  Clinically stable  0 - Asymptomatic    Review of Systems   Constitutional: Negative for chills and fever  HENT: Negative for nosebleeds  Eyes: Negative for discharge  Respiratory: Negative for cough and shortness of breath  Cardiovascular: Negative for chest pain  Gastrointestinal: Negative for abdominal pain, constipation and diarrhea  Endocrine: Negative for polydipsia  Genitourinary: Negative for hematuria  Musculoskeletal: Negative for arthralgias  Skin: Negative for color change  Allergic/Immunologic: Negative for immunocompromised state  Neurological: Negative for dizziness and headaches  Hematological: Negative for adenopathy  Psychiatric/Behavioral: Negative for agitation         Past Medical History:   Diagnosis Date    Anemia, unspecified 4/30/2018    Hyperglycemia     Last Assessed:1/18/18    Waldenstrom's macroglobulinemia ([facility] ) 6/21/2018     Patient Active Problem List   Diagnosis    Unilateral inguinal hernia without obstruction or gangrene    Inguinal hernia bilateral, non-recurrent    Anemia, unspecified    Monoclonal B-cell lymphocytosis    Fall    Rib pain on left side    Waldenstrom's macroglobulinemia ([facility] )       Current Outpatient Prescriptions:     Ibrutinib 140 MG CAPS, Take 2 caps by mouth each day , Disp: 60 capsule, Rfl: 3    Multiple Vitamin (MULTI VITAMIN MENS PO), Take 1 tablet by mouth daily, Disp: , Rfl:   No Known Allergies  Past Surgical History:   Procedure Laterality Date    WISDOM TOOTH EXTRACTION      Last Assessed:1/18/18     Social History     Objective:  Vitals:    11/26/18 1119   BP: 122/78   BP Location: Right arm   Patient Position: Sitting   Cuff Size: Adult   Pulse: 83   Resp: 16   Temp: 98 3 °F (36 8 °C)   TempSrc: Tympanic   SpO2: 96%   Weight: 70 3 kg (155 lb)   Height: 5' 7 5" (1 715 m)     Physical Exam   Constitutional: He is oriented to person, place, and time  He appears well-developed  HENT:   Head: Normocephalic  Eyes: Pupils are equal, round, and reactive to light  Neck: Neck supple  Cardiovascular: Normal rate and regular rhythm  No murmur heard  Pulmonary/Chest: Breath sounds normal  He has no wheezes  He has no rales  Abdominal: Soft  There is no tenderness  Musculoskeletal: Normal range of motion  He exhibits no edema or tenderness  Lymphadenopathy:     He has no cervical adenopathy  Neurological: He is alert and oriented to person, place, and time  He has normal reflexes  No cranial nerve deficit  Skin: No rash noted  No erythema  Psychiatric: He has a normal mood and affect  His behavior is normal          Labs: I personally reviewed the labs and imaging pertinent to this patient care

## 2018-11-28 ENCOUNTER — TELEPHONE (OUTPATIENT)
Dept: HEMATOLOGY ONCOLOGY | Facility: CLINIC | Age: 63
End: 2018-11-28

## 2018-11-30 ENCOUNTER — TELEPHONE (OUTPATIENT)
Dept: HEMATOLOGY ONCOLOGY | Facility: CLINIC | Age: 63
End: 2018-11-30

## 2018-11-30 ENCOUNTER — DOCUMENTATION (OUTPATIENT)
Dept: HEMATOLOGY ONCOLOGY | Facility: CLINIC | Age: 63
End: 2018-11-30

## 2018-11-30 NOTE — PROGRESS NOTES
11/28/2018 received notification from Luis Armando Hand the [facility] needs auth  However, there is an Rin Priest on file which is valid until 12/26/2018  Notified diplomat of this  Per Marco Perales, they called the insurance and it was a glitch on their side  Marco Perales was able to get the claim to go through  However an Rin Priest request was submitted in the meantime  The form was completed, signed by dr Iram Magana, and sent for Rin Priest on 11/28/2018 11/30/2018 received approval letter from [facility]  Rin Priest is valid from 11/30/2018 through 5/30/2019  Notified clinical and diplomat

## 2018-11-30 NOTE — TELEPHONE ENCOUNTER
PATIENT CALLED CONCERN THAT HE RECEIVED A CALL ABOUT THE MEDICATION IBRUTINIB 140MG  , THAT THEY ARE WAITING FOR A APPROVAL FROM  [facility] [place] ,  FROM THE [facility]    CAN IT BE CONFIRM IF ITS APPROVE ALREADY OR ITS IN PROCESS,   THANK YOU

## 2018-11-30 NOTE — TELEPHONE ENCOUNTER
Lalo Reyes is off and this is a Dr Jarocho Agosto patient  Please send to whoever is covering for Crystal Vivian

## 2018-11-30 NOTE — TELEPHONE ENCOUNTER
Kyree Pike,     This is what I received from Diplomat:    My insurance team was able to get a paid claim to fill this month for Mr Vinny Infante 1955  Took an extra call to his plan but PA is covering until 12/26/18  We will need to get the renewal in place for next fill  We are working on getting a call out to him to set up delivery now    I called the patient  Explained everything to him  He stated he will call diplomat to arrange delivery

## 2019-01-11 ENCOUNTER — TELEPHONE (OUTPATIENT)
Dept: HEMATOLOGY ONCOLOGY | Facility: MEDICAL CENTER | Age: 64
End: 2019-01-11

## 2019-01-11 NOTE — TELEPHONE ENCOUNTER
Laura Can called and wandered if he could travel to [place] on the date 4/17-April 22  Laura Can needs a note from Dr Gaudencio Abdul if it is ok for him to go  Please reach out to him at [phone]

## 2019-01-29 ENCOUNTER — OFFICE VISIT (OUTPATIENT)
Dept: INTERNAL MEDICINE CLINIC | Facility: CLINIC | Age: 64
End: 2019-01-29
Payer: COMMERCIAL

## 2019-01-29 VITALS
DIASTOLIC BLOOD PRESSURE: 70 MMHG | TEMPERATURE: 98 F | WEIGHT: 157.6 LBS | OXYGEN SATURATION: 98 % | SYSTOLIC BLOOD PRESSURE: 102 MMHG | BODY MASS INDEX: 24.32 KG/M2 | HEART RATE: 84 BPM

## 2019-01-29 DIAGNOSIS — L73.9 FOLLICULITIS: Primary | ICD-10-CM

## 2019-01-29 PROCEDURE — 99213 OFFICE O/P EST LOW 20 MIN: CPT | Performed by: NURSE PRACTITIONER

## 2019-01-29 NOTE — PROGRESS NOTES
Assessment/Plan:    Folliculitis  Appears to be consistent with folliculitis  No burning, pain or concerns for zoster  Recommend using antimicrobial soap - such as dial  Make sure you shower daily  Use topical neosporin  No need for oral antibiotics    Follow-up if no improvement in 1 week or worsening symptoms     Diagnoses and all orders for this visit:    Folliculitis        Subjective:      Patient ID: Thania Anderson is a 66 y o  male  Rash   This is a new problem  Episode onset: 1 week  The problem is unchanged  Location: L hip  The rash is characterized by dryness and redness (Denies pain, burning, itching)  He was exposed to nothing  Pertinent negatives include no fatigue, fever, shortness of breath or vomiting  Past treatments include nothing  The treatment provided no relief  Pt reports rash in is same location of his bone marrow biopsy he had 5 months ago, however never had rash or wound to that area previously  The following portions of the patient's history were reviewed and updated as appropriate: allergies, current medications, past family history, past medical history, past social history, past surgical history and problem list     Review of Systems   Constitutional: Negative for appetite change, chills, fatigue, fever and unexpected weight change  Respiratory: Negative for shortness of breath and wheezing  Cardiovascular: Negative for chest pain and palpitations  Gastrointestinal: Negative for nausea and vomiting  Skin: Positive for rash  Negative for wound  Neurological: Negative for dizziness and light-headedness           Past Medical History:   Diagnosis Date    Anemia, unspecified 4/30/2018    Hyperglycemia     Last Assessed:1/18/18    [facility]'s [facility] ) 6/21/2018         Current Outpatient Prescriptions:     Ibrutinib 140 MG CAPS, Take 2 caps by mouth each day , Disp: 60 capsule, Rfl: 3    Multiple Vitamin (MULTI VITAMIN MENS PO), Take 1 tablet by mouth daily, Disp: , Rfl:     No Known Allergies    Social History   Past Surgical History:   Procedure Laterality Date    WISDOM TOOTH EXTRACTION      Last Assessed:1/18/18     Family History   Problem Relation Age of Onset   Soraya Weaver Parkinsonism Mother     Thyroid disease Mother     Diabetes Father     Other Father         Heart Artery Stent       Objective:  /70 (BP Location: Left arm, Patient Position: Sitting, Cuff Size: Standard)   Pulse 84   Temp 98 °F (36 7 °C) (Oral)   Wt 71 5 kg (157 lb 9 6 oz) Comment: with shoes  SpO2 98%   BMI 24 32 kg/m²      Physical Exam   Constitutional: He is oriented to person, place, and time  He appears well-developed and well-nourished  No distress  Cardiovascular: Normal rate and regular rhythm  Pulmonary/Chest: Effort normal and breath sounds normal  No respiratory distress  He has no wheezes  Neurological: He is alert and oriented to person, place, and time  Skin: Skin is warm and dry  See photo for full details  Folliculitis  Pt with two small areas of mild erythema to L gluteal   No drainage  Additionally with small surrounding scattered areas   Psychiatric: He has a normal mood and affect  His behavior is normal  Judgment and thought content normal    Nursing note and vitals reviewed

## 2019-01-29 NOTE — PATIENT INSTRUCTIONS
Consistent with folliculitis   Recommend using antimicrobial soap - such as dial  Make sure you shower daily  Use topical neosporin  No need for oral antibiotics    Follow-up if no improvement in 1 week or worsening symptoms

## 2019-02-22 DIAGNOSIS — C88.0 WALDENSTROM'S MACROGLOBULINEMIA (HCC): ICD-10-CM

## 2019-02-25 ENCOUNTER — APPOINTMENT (OUTPATIENT)
Dept: LAB | Facility: CLINIC | Age: 64
End: 2019-02-25
Payer: COMMERCIAL

## 2019-02-25 DIAGNOSIS — C88.0 WALDENSTROM'S MACROGLOBULINEMIA (HCC): ICD-10-CM

## 2019-02-25 LAB
ALBUMIN SERPL BCP-MCNC: 4.1 G/DL (ref 3.5–5)
ALP SERPL-CCNC: 104 U/L (ref 46–116)
ALT SERPL W P-5'-P-CCNC: 28 U/L (ref 12–78)
ANION GAP SERPL CALCULATED.3IONS-SCNC: 5 MMOL/L (ref 4–13)
AST SERPL W P-5'-P-CCNC: 15 U/L (ref 5–45)
BASOPHILS # BLD AUTO: 0.06 THOUSANDS/ΜL (ref 0–0.1)
BASOPHILS NFR BLD AUTO: 1 % (ref 0–1)
BILIRUB SERPL-MCNC: 0.72 MG/DL (ref 0.2–1)
BUN SERPL-MCNC: 14 MG/DL (ref 5–25)
CALCIUM SERPL-MCNC: 9 MG/DL (ref 8.3–10.1)
CHLORIDE SERPL-SCNC: 105 MMOL/L (ref 100–108)
CO2 SERPL-SCNC: 28 MMOL/L (ref 21–32)
CREAT SERPL-MCNC: 0.94 MG/DL (ref 0.6–1.3)
EOSINOPHIL # BLD AUTO: 0.05 THOUSAND/ΜL (ref 0–0.61)
EOSINOPHIL NFR BLD AUTO: 1 % (ref 0–6)
ERYTHROCYTE [DISTWIDTH] IN BLOOD BY AUTOMATED COUNT: 13.8 % (ref 11.6–15.1)
GFR SERPL CREATININE-BSD FRML MDRD: 87 ML/MIN/1.73SQ M
GLUCOSE P FAST SERPL-MCNC: 89 MG/DL (ref 65–99)
HCT VFR BLD AUTO: 43.6 % (ref 36.5–49.3)
HGB BLD-MCNC: 14.9 G/DL (ref 12–17)
IGA SERPL-MCNC: 32 MG/DL (ref 70–400)
IGG SERPL-MCNC: 402 MG/DL (ref 700–1600)
IGM SERPL-MCNC: 238 MG/DL (ref 40–230)
IMM GRANULOCYTES # BLD AUTO: 0.02 THOUSAND/UL (ref 0–0.2)
IMM GRANULOCYTES NFR BLD AUTO: 0 % (ref 0–2)
LYMPHOCYTES # BLD AUTO: 1.75 THOUSANDS/ΜL (ref 0.6–4.47)
LYMPHOCYTES NFR BLD AUTO: 27 % (ref 14–44)
MCH RBC QN AUTO: 29.7 PG (ref 26.8–34.3)
MCHC RBC AUTO-ENTMCNC: 34.2 G/DL (ref 31.4–37.4)
MCV RBC AUTO: 87 FL (ref 82–98)
MONOCYTES # BLD AUTO: 0.65 THOUSAND/ΜL (ref 0.17–1.22)
MONOCYTES NFR BLD AUTO: 10 % (ref 4–12)
NEUTROPHILS # BLD AUTO: 3.91 THOUSANDS/ΜL (ref 1.85–7.62)
NEUTS SEG NFR BLD AUTO: 61 % (ref 43–75)
NRBC BLD AUTO-RTO: 0 /100 WBCS
PLATELET # BLD AUTO: 243 THOUSANDS/UL (ref 149–390)
PMV BLD AUTO: 10.5 FL (ref 8.9–12.7)
POTASSIUM SERPL-SCNC: 4 MMOL/L (ref 3.5–5.3)
PROT SERPL-MCNC: 7 G/DL (ref 6.4–8.2)
RBC # BLD AUTO: 5.02 MILLION/UL (ref 3.88–5.62)
SODIUM SERPL-SCNC: 138 MMOL/L (ref 136–145)
WBC # BLD AUTO: 6.44 THOUSAND/UL (ref 4.31–10.16)

## 2019-02-25 PROCEDURE — 82784 ASSAY IGA/IGD/IGG/IGM EACH: CPT

## 2019-02-25 PROCEDURE — 80053 COMPREHEN METABOLIC PANEL: CPT

## 2019-02-25 PROCEDURE — 85025 COMPLETE CBC W/AUTO DIFF WBC: CPT

## 2019-02-25 PROCEDURE — 36415 COLL VENOUS BLD VENIPUNCTURE: CPT

## 2019-02-25 PROCEDURE — 83883 ASSAY NEPHELOMETRY NOT SPEC: CPT

## 2019-02-26 LAB
KAPPA LC FREE SER-MCNC: 16.5 MG/L (ref 3.3–19.4)
KAPPA LC FREE/LAMBDA FREE SER: 4.85 {RATIO} (ref 0.26–1.65)
LAMBDA LC FREE SERPL-MCNC: 3.4 MG/L (ref 5.7–26.3)

## 2019-02-28 ENCOUNTER — OFFICE VISIT (OUTPATIENT)
Dept: HEMATOLOGY ONCOLOGY | Facility: CLINIC | Age: 64
End: 2019-02-28
Payer: COMMERCIAL

## 2019-02-28 VITALS
TEMPERATURE: 97.8 F | BODY MASS INDEX: 24.37 KG/M2 | HEIGHT: 68 IN | WEIGHT: 160.8 LBS | OXYGEN SATURATION: 97 % | RESPIRATION RATE: 17 BRPM | DIASTOLIC BLOOD PRESSURE: 74 MMHG | HEART RATE: 70 BPM | SYSTOLIC BLOOD PRESSURE: 132 MMHG

## 2019-02-28 DIAGNOSIS — C88.0 WALDENSTROM'S MACROGLOBULINEMIA (HCC): Primary | ICD-10-CM

## 2019-02-28 PROCEDURE — 99214 OFFICE O/P EST MOD 30 MIN: CPT | Performed by: INTERNAL MEDICINE

## 2019-02-28 NOTE — LETTER
February 28, 2019     Tia M[address] [place]  [address]    Patient: Taiwo Chen   YOB: 1955   Date of Visit: 2/28/2019       Dear Dr Alirio Moody: Thank you for referring Taiwo Chen to me for evaluation  Below are my notes for this consultation  If you have questions, please do not hesitate to call me  I look forward to following your patient along with you  Sincerely,        Blane Fernandes DO        CC: No Recipients  Blane Fernandes DO  2/28/2019 11:04 AM  Sign at close encounter  [address]  [address]  [facility] [address] [facility] [id number] [phone]    Gary Cervantes [id number]  02/28/19    Discussion:   In summary, this is a 82-year-old male history of Waldenstroms as outlined  Clinically he is doing well  He generally functions without restriction  Recent CBC and chemistry are entirely normal   IgM level has decreased to 238, normal less than 230  Free light chain ratio has increased slightly  I do not think this is clinically significant, however  He also has slight depression of IgG and IgA  I believe these are toxicities of Ibrutinib  He has not had any significant or frequent infection problems  Altogether, I would say that he is responding to therapy and tolerating it well  Treatment continuation is recommended  We will see him back in 4 months for review with repeat blood work  It will be about a year since his last imaging  We made arrangements for another CT scan but if that looks acceptable I would probably not reimage him for 2 years, unless clinical indications arose  I discussed the above with the patient  The patient and his brother voiced understanding and agreement   ______________________________________________________________________    No chief complaint on file        HPI:     Waldenstrom's macroglobulinemia ([facility] [address] )    6/8/2018 Biopsy Overall, the combination of morphologic and immunophenotypic features is consistent with 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 with small cell size and overall low grade appearance  The morphologic features and nonspecific immunophenotype are most consistent with marginal zone lymphoma or lymphoplasmacytic lymphoma  The presence of a monoclonal IgM expressing plasma cell population with expression of the same light chain as the malignant B-cell population in the setting of IgM monoclonal paraprotein may favor lymphoplasmacytic lymphoma, though is not definitively specific  MYD 88 +          6/21/2018 Initial Diagnosis     Waldenstrom's macroglobulinemia [facility])  April 2018 patient had hernia repair  Leukocytosis was noted  Peripheral blood flow cytometry showed findings consistent with monoclonal B-cell lymphocytosis  Additionally, he was found to have a kappa lambda ratio of 36 0  IgM 3700 with reciprocal inhibition  Skeletal survey showed no lytic bone foci  7/11/2018 -  Chemotherapy     Ibrutinib 280 mg p o  daily  Interval History:  Clinically stable  0 - Asymptomatic    Review of Systems   Constitutional: Negative for chills and fever  HENT: Negative for nosebleeds  Eyes: Negative for discharge  Respiratory: Negative for cough and shortness of breath  Cardiovascular: Negative for chest pain  Gastrointestinal: Negative for abdominal pain, constipation and diarrhea  Endocrine: Negative for polydipsia  Genitourinary: Negative for hematuria  Musculoskeletal: Negative for arthralgias  Skin: Negative for color change  Allergic/Immunologic: Negative for immunocompromised state  Neurological: Negative for dizziness and headaches  Hematological: Negative for adenopathy  Psychiatric/Behavioral: Negative for agitation         Past Medical History:   Diagnosis Date    Anemia, unspecified 4/30/2018    Hyperglycemia     Last Assessed:1/18/18    Waldenstrom's macroglobulinemia ([facility] [address] ) 6/21/2018     Patient Active Problem List   Diagnosis    Unilateral inguinal hernia without obstruction or gangrene    Inguinal hernia bilateral, non-recurrent    Anemia, unspecified    Monoclonal B-cell lymphocytosis    Fall    Rib pain on left side    Waldenstrom's macroglobulinemia (H[facility])    Folliculitis       Current Outpatient Medications:     Ibrutinib 140 MG CAPS, Take 1 capsule (140 mg total) by mouth daily Take 2 caps by mouth each day , Disp: 60 capsule, Rfl: 3    Multiple Vitamin (MULTI VITAMIN MENS PO), Take 1 tablet by mouth daily, Disp: , Rfl:   No Known Allergies  Past Surgical History:   Procedure Laterality Date    WISDOM TOOTH EXTRACTION      Last Assessed:1/18/18     Social History     Objective: There were no vitals filed for this visit  Physical Exam   Constitutional: He is oriented to person, place, and time  He appears well-developed  HENT:   Head: Normocephalic  Eyes: Pupils are equal, round, and reactive to light  Neck: Neck supple  Cardiovascular: Normal rate and regular rhythm  No murmur heard  Pulmonary/Chest: Breath sounds normal  He has no wheezes  He has no rales  Abdominal: Soft  There is no tenderness  Musculoskeletal: Normal range of motion  He exhibits no edema or tenderness  Lymphadenopathy:     He has no cervical adenopathy  Neurological: He is alert and oriented to person, place, and time  He has normal reflexes  No cranial nerve deficit  Skin: No rash noted  No erythema  Psychiatric: He has a normal mood and affect  His behavior is normal          Labs: I personally reviewed the labs and imaging pertinent to this patient care

## 2019-06-03 ENCOUNTER — TELEPHONE (OUTPATIENT)
Dept: GYNECOLOGIC ONCOLOGY | Facility: CLINIC | Age: 64
End: 2019-06-03

## 2019-06-05 ENCOUNTER — TELEPHONE (OUTPATIENT)
Dept: HEMATOLOGY ONCOLOGY | Facility: CLINIC | Age: 64
End: 2019-06-05

## 2019-06-05 ENCOUNTER — DOCUMENTATION (OUTPATIENT)
Dept: HEMATOLOGY ONCOLOGY | Facility: CLINIC | Age: 64
End: 2019-06-05

## 2019-06-27 DIAGNOSIS — C88.0 WALDENSTROM'S MACROGLOBULINEMIA (HCC): ICD-10-CM

## 2019-06-28 ENCOUNTER — HOSPITAL ENCOUNTER (OUTPATIENT)
Dept: RADIOLOGY | Age: 64
Discharge: HOME | End: 2019-06-28
Payer: COMMERCIAL

## 2019-06-28 DIAGNOSIS — C88.0 WALDENSTROM'S MACROGLOBULINEMIA (HCC): ICD-10-CM

## 2019-06-28 PROCEDURE — 71260 CT THORAX DX C+: CPT

## 2019-06-28 PROCEDURE — 74177 CT ABD & PELVIS W/CONTRAST: CPT

## 2019-06-28 RX ADMIN — IOHEXOL 100 ML: 350 INJECTION, SOLUTION INTRAVENOUS at 09:17

## 2019-07-01 ENCOUNTER — APPOINTMENT (OUTPATIENT)
Dept: LAB | Facility: CLINIC | Age: 64
End: 2019-07-01
Payer: COMMERCIAL

## 2019-07-01 DIAGNOSIS — C88.0 WALDENSTROM'S MACROGLOBULINEMIA (HCC): ICD-10-CM

## 2019-07-01 LAB
ALBUMIN SERPL BCP-MCNC: 4.2 G/DL (ref 3.5–5)
ALP SERPL-CCNC: 72 U/L (ref 46–116)
ALT SERPL W P-5'-P-CCNC: 27 U/L (ref 12–78)
ANION GAP SERPL CALCULATED.3IONS-SCNC: 6 MMOL/L (ref 4–13)
AST SERPL W P-5'-P-CCNC: 16 U/L (ref 5–45)
BASOPHILS # BLD AUTO: 0.06 THOUSANDS/ΜL (ref 0–0.1)
BASOPHILS NFR BLD AUTO: 1 % (ref 0–1)
BILIRUB SERPL-MCNC: 0.78 MG/DL (ref 0.2–1)
BUN SERPL-MCNC: 9 MG/DL (ref 5–25)
CALCIUM SERPL-MCNC: 9.2 MG/DL (ref 8.3–10.1)
CHLORIDE SERPL-SCNC: 103 MMOL/L (ref 100–108)
CO2 SERPL-SCNC: 28 MMOL/L (ref 21–32)
CREAT SERPL-MCNC: 0.84 MG/DL (ref 0.6–1.3)
EOSINOPHIL # BLD AUTO: 0.06 THOUSAND/ΜL (ref 0–0.61)
EOSINOPHIL NFR BLD AUTO: 1 % (ref 0–6)
ERYTHROCYTE [DISTWIDTH] IN BLOOD BY AUTOMATED COUNT: 12.8 % (ref 11.6–15.1)
GFR SERPL CREATININE-BSD FRML MDRD: 95 ML/MIN/1.73SQ M
GLUCOSE P FAST SERPL-MCNC: 78 MG/DL (ref 65–99)
HCT VFR BLD AUTO: 42.8 % (ref 36.5–49.3)
HGB BLD-MCNC: 14.7 G/DL (ref 12–17)
IGA SERPL-MCNC: 28 MG/DL (ref 70–400)
IGG SERPL-MCNC: 343 MG/DL (ref 700–1600)
IGM SERPL-MCNC: 161 MG/DL (ref 40–230)
IMM GRANULOCYTES # BLD AUTO: 0.02 THOUSAND/UL (ref 0–0.2)
IMM GRANULOCYTES NFR BLD AUTO: 0 % (ref 0–2)
LYMPHOCYTES # BLD AUTO: 1.31 THOUSANDS/ΜL (ref 0.6–4.47)
LYMPHOCYTES NFR BLD AUTO: 21 % (ref 14–44)
MCH RBC QN AUTO: 31.1 PG (ref 26.8–34.3)
MCHC RBC AUTO-ENTMCNC: 34.3 G/DL (ref 31.4–37.4)
MCV RBC AUTO: 91 FL (ref 82–98)
MONOCYTES # BLD AUTO: 0.57 THOUSAND/ΜL (ref 0.17–1.22)
MONOCYTES NFR BLD AUTO: 9 % (ref 4–12)
NEUTROPHILS # BLD AUTO: 4.29 THOUSANDS/ΜL (ref 1.85–7.62)
NEUTS SEG NFR BLD AUTO: 68 % (ref 43–75)
NRBC BLD AUTO-RTO: 0 /100 WBCS
PLATELET # BLD AUTO: 234 THOUSANDS/UL (ref 149–390)
PMV BLD AUTO: 10.8 FL (ref 8.9–12.7)
POTASSIUM SERPL-SCNC: 4.5 MMOL/L (ref 3.5–5.3)
PROT SERPL-MCNC: 6.7 G/DL (ref 6.4–8.2)
RBC # BLD AUTO: 4.73 MILLION/UL (ref 3.88–5.62)
SODIUM SERPL-SCNC: 137 MMOL/L (ref 136–145)
WBC # BLD AUTO: 6.31 THOUSAND/UL (ref 4.31–10.16)

## 2019-07-01 PROCEDURE — 83883 ASSAY NEPHELOMETRY NOT SPEC: CPT

## 2019-07-01 PROCEDURE — 80053 COMPREHEN METABOLIC PANEL: CPT

## 2019-07-01 PROCEDURE — 36415 COLL VENOUS BLD VENIPUNCTURE: CPT

## 2019-07-01 PROCEDURE — 82784 ASSAY IGA/IGD/IGG/IGM EACH: CPT

## 2019-07-01 PROCEDURE — 85025 COMPLETE CBC W/AUTO DIFF WBC: CPT

## 2019-07-02 ENCOUNTER — TELEPHONE (OUTPATIENT)
Dept: HEMATOLOGY ONCOLOGY | Facility: CLINIC | Age: 64
End: 2019-07-02

## 2019-07-02 LAB
KAPPA LC FREE SER-MCNC: 16.6 MG/L (ref 3.3–19.4)
KAPPA LC FREE/LAMBDA FREE SER: 5.53 {RATIO} (ref 0.26–1.65)
LAMBDA LC FREE SERPL-MCNC: 3 MG/L (ref 5.7–26.3)

## 2019-07-02 NOTE — TELEPHONE ENCOUNTER
[facility] faxed paper over to note that patient mentioned he had some weight loss due to the stress of losing his dad  However, he has gained the weight back and wanted to ensure that our office was aware  Noted  This is a fixed dose and does not require further investigation

## 2019-07-05 ENCOUNTER — TELEPHONE (OUTPATIENT)
Dept: HEMATOLOGY ONCOLOGY | Facility: CLINIC | Age: 64
End: 2019-07-05

## 2019-07-05 NOTE — TELEPHONE ENCOUNTER
They need clarification on imbruvica that was sent over there are two instructions  Please call asap

## 2019-07-08 ENCOUNTER — OFFICE VISIT (OUTPATIENT)
Dept: HEMATOLOGY ONCOLOGY | Facility: CLINIC | Age: 64
End: 2019-07-08
Payer: COMMERCIAL

## 2019-07-08 VITALS
HEART RATE: 63 BPM | DIASTOLIC BLOOD PRESSURE: 84 MMHG | WEIGHT: 152 LBS | RESPIRATION RATE: 16 BRPM | BODY MASS INDEX: 23.04 KG/M2 | HEIGHT: 68 IN | SYSTOLIC BLOOD PRESSURE: 136 MMHG | TEMPERATURE: 97.1 F

## 2019-07-08 DIAGNOSIS — C88.0 WALDENSTROM'S MACROGLOBULINEMIA (HCC): Primary | ICD-10-CM

## 2019-07-08 PROCEDURE — 99214 OFFICE O/P EST MOD 30 MIN: CPT | Performed by: INTERNAL MEDICINE

## 2019-07-08 NOTE — PROGRESS NOTES
[facility] HEMATOLOGY ONCOLOGY [facility] [place]  [address] [facility] [id number] [phone]    Caridad Deng, [id number]  07/08/19    Discussion:   In summary, this is a 73-year-old male history of Waldenstroms as outlined  He is currently on Ibrutinib  He tolerates this without difficulty  IgM has normalized  Free light chain remains abnormal, however  I suspect this is a reflection of suppression of normal production of free light chains in a symmetric fashion as a toxicity of his therapy  I do not believe there is any clinical impact from this, however  CBC and CMP are normal   Clinically he functions without difficulty  His father recently passed away  Condolences were provided  I reviewed the above considerations with the patient and his brother  If all is well we will see him back in 3 months for review with repeat labs just prior to that visit  I discussed the above with the patient  The patient  voiced understanding and agreement   ______________________________________________________________________    Chief Complaint   Patient presents with    Follow-up     4 month        HPI:     Waldenstrom's macroglobulinemia ([facility] [address] )    6/8/2018 Biopsy     Overall, the combination of morphologic and immunophenotypic features is consistent with 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 with small cell size and overall low grade appearance  The morphologic features and nonspecific immunophenotype are most consistent with marginal zone lymphoma or lymphoplasmacytic lymphoma  The presence of a monoclonal IgM expressing plasma cell population with expression of the same light chain as the malignant B-cell population in the setting of IgM monoclonal paraprotein may favor lymphoplasmacytic lymphoma, though is not definitively specific     MYD 88 +       6/21/2018 Initial Diagnosis     Waldenstrom's macroglobulinemia [facility])  April 2018 patient had hernia repair  Leukocytosis was noted  Peripheral blood flow cytometry showed findings consistent with monoclonal B-cell lymphocytosis  Additionally, he was found to have a kappa lambda ratio of 36 0  IgM 3700 with reciprocal inhibition  Skeletal survey showed no lytic bone foci  7/11/2018 -  Chemotherapy     Ibrutinib 280 mg p o  daily  Interval History:  Clinically stable  0 - Asymptomatic    Review of Systems   Constitutional: Negative for chills and fever  HENT: Negative for nosebleeds  Eyes: Negative for discharge  Respiratory: Negative for cough and shortness of breath  Cardiovascular: Negative for chest pain  Gastrointestinal: Negative for abdominal pain, constipation and diarrhea  Endocrine: Negative for polydipsia  Genitourinary: Negative for hematuria  Musculoskeletal: Negative for arthralgias  Skin: Negative for color change  Allergic/Immunologic: Negative for immunocompromised state  Neurological: Negative for dizziness and headaches  Hematological: Negative for adenopathy  Psychiatric/Behavioral: Negative for agitation         Past Medical History:   Diagnosis Date    Anemia, unspecified 4/30/2018    Hyperglycemia     Last Assessed:1/18/18    [facility] 6/21/2018     Patient Active Problem List   Diagnosis    Unilateral inguinal hernia without obstruction or gangrene    Inguinal hernia bilateral, non-recurrent    Anemia, unspecified    Monoclonal B-cell lymphocytosis    Fall    Rib pain on left side    Waldenstrom's macroglobulinemia (HCC)    Folliculitis       Current Outpatient Medications:     Ibrutinib 140 MG CAPS, Take 1 capsule (140 mg total) by mouth daily Take 2 caps by mouth each day , Disp: 60 capsule, Rfl: 3    Multiple Vitamin (MULTI VITAMIN MENS PO), Take 1 tablet by mouth daily, Disp: , Rfl:   No Known Allergies  Past Surgical History:   Procedure Laterality Date    WISDOM TOOTH EXTRACTION      Last Assessed:1/18/18     Social History     Objective:  Vitals:    07/08/19 1300   BP: 136/84   BP Location: Left arm   Pulse: 63   Resp: 16   Temp: (!) 97 1 °F (36 2 °C)   TempSrc: Tympanic   Weight: 68 9 kg (152 lb)   Height: 5' 7 71" (1 72 m)     Physical Exam   Constitutional: He is oriented to person, place, and time  He appears well-developed  HENT:   Head: Normocephalic  Eyes: Pupils are equal, round, and reactive to light  Neck: Neck supple  Cardiovascular: Normal rate and regular rhythm  No murmur heard  Pulmonary/Chest: Breath sounds normal  He has no wheezes  He has no rales  Abdominal: Soft  There is no tenderness  Musculoskeletal: Normal range of motion  He exhibits no edema or tenderness  Lymphadenopathy:     He has no cervical adenopathy  Neurological: He is alert and oriented to person, place, and time  He has normal reflexes  No cranial nerve deficit  Skin: No rash noted  No erythema  Psychiatric: He has a normal mood and affect  His behavior is normal          Labs: I personally reviewed the labs and imaging pertinent to this patient care

## 2019-09-24 ENCOUNTER — CLINICAL SUPPORT (OUTPATIENT)
Dept: INTERNAL MEDICINE CLINIC | Facility: CLINIC | Age: 64
End: 2019-09-24
Payer: COMMERCIAL

## 2019-09-24 DIAGNOSIS — Z23 NEED FOR INFLUENZA VACCINATION: Primary | ICD-10-CM

## 2019-09-24 PROCEDURE — 90471 IMMUNIZATION ADMIN: CPT

## 2019-09-24 PROCEDURE — 90682 RIV4 VACC RECOMBINANT DNA IM: CPT

## 2019-10-01 ENCOUNTER — APPOINTMENT (OUTPATIENT)
Dept: LAB | Facility: CLINIC | Age: 64
End: 2019-10-01
Payer: COMMERCIAL

## 2019-10-01 DIAGNOSIS — C88.0 WALDENSTROM'S MACROGLOBULINEMIA (HCC): ICD-10-CM

## 2019-10-01 LAB
ALBUMIN SERPL BCP-MCNC: 4.1 G/DL (ref 3.5–5)
ALP SERPL-CCNC: 76 U/L (ref 46–116)
ALT SERPL W P-5'-P-CCNC: 23 U/L (ref 12–78)
ANION GAP SERPL CALCULATED.3IONS-SCNC: 7 MMOL/L (ref 4–13)
AST SERPL W P-5'-P-CCNC: 18 U/L (ref 5–45)
BASOPHILS # BLD AUTO: 0.06 THOUSANDS/ΜL (ref 0–0.1)
BASOPHILS NFR BLD AUTO: 1 % (ref 0–1)
BILIRUB SERPL-MCNC: 1.01 MG/DL (ref 0.2–1)
BUN SERPL-MCNC: 12 MG/DL (ref 5–25)
CALCIUM SERPL-MCNC: 9 MG/DL (ref 8.3–10.1)
CHLORIDE SERPL-SCNC: 103 MMOL/L (ref 100–108)
CO2 SERPL-SCNC: 25 MMOL/L (ref 21–32)
CREAT SERPL-MCNC: 0.89 MG/DL (ref 0.6–1.3)
EOSINOPHIL # BLD AUTO: 0.06 THOUSAND/ΜL (ref 0–0.61)
EOSINOPHIL NFR BLD AUTO: 1 % (ref 0–6)
ERYTHROCYTE [DISTWIDTH] IN BLOOD BY AUTOMATED COUNT: 12.4 % (ref 11.6–15.1)
GFR SERPL CREATININE-BSD FRML MDRD: 92 ML/MIN/1.73SQ M
GLUCOSE P FAST SERPL-MCNC: 100 MG/DL (ref 65–99)
HCT VFR BLD AUTO: 43.5 % (ref 36.5–49.3)
HGB BLD-MCNC: 15 G/DL (ref 12–17)
IGA SERPL-MCNC: 30 MG/DL (ref 70–400)
IGG SERPL-MCNC: 319 MG/DL (ref 700–1600)
IGM SERPL-MCNC: 153 MG/DL (ref 40–230)
IMM GRANULOCYTES # BLD AUTO: 0.03 THOUSAND/UL (ref 0–0.2)
IMM GRANULOCYTES NFR BLD AUTO: 1 % (ref 0–2)
LYMPHOCYTES # BLD AUTO: 1.33 THOUSANDS/ΜL (ref 0.6–4.47)
LYMPHOCYTES NFR BLD AUTO: 20 % (ref 14–44)
MCH RBC QN AUTO: 31.3 PG (ref 26.8–34.3)
MCHC RBC AUTO-ENTMCNC: 34.5 G/DL (ref 31.4–37.4)
MCV RBC AUTO: 91 FL (ref 82–98)
MONOCYTES # BLD AUTO: 0.78 THOUSAND/ΜL (ref 0.17–1.22)
MONOCYTES NFR BLD AUTO: 12 % (ref 4–12)
NEUTROPHILS # BLD AUTO: 4.35 THOUSANDS/ΜL (ref 1.85–7.62)
NEUTS SEG NFR BLD AUTO: 65 % (ref 43–75)
NRBC BLD AUTO-RTO: 0 /100 WBCS
PLATELET # BLD AUTO: 223 THOUSANDS/UL (ref 149–390)
PMV BLD AUTO: 10.5 FL (ref 8.9–12.7)
POTASSIUM SERPL-SCNC: 4.5 MMOL/L (ref 3.5–5.3)
PROT SERPL-MCNC: 6.5 G/DL (ref 6.4–8.2)
RBC # BLD AUTO: 4.79 MILLION/UL (ref 3.88–5.62)
SODIUM SERPL-SCNC: 135 MMOL/L (ref 136–145)
WBC # BLD AUTO: 6.61 THOUSAND/UL (ref 4.31–10.16)

## 2019-10-01 PROCEDURE — 85025 COMPLETE CBC W/AUTO DIFF WBC: CPT

## 2019-10-01 PROCEDURE — 82784 ASSAY IGA/IGD/IGG/IGM EACH: CPT

## 2019-10-01 PROCEDURE — 36415 COLL VENOUS BLD VENIPUNCTURE: CPT

## 2019-10-01 PROCEDURE — 83883 ASSAY NEPHELOMETRY NOT SPEC: CPT

## 2019-10-01 PROCEDURE — 80053 COMPREHEN METABOLIC PANEL: CPT

## 2019-10-02 LAB
KAPPA LC FREE SER-MCNC: 15.6 MG/L (ref 3.3–19.4)
KAPPA LC FREE/LAMBDA FREE SER: 5.03 {RATIO} (ref 0.26–1.65)
LAMBDA LC FREE SERPL-MCNC: 3.1 MG/L (ref 5.7–26.3)

## 2019-10-07 ENCOUNTER — OFFICE VISIT (OUTPATIENT)
Dept: HEMATOLOGY ONCOLOGY | Facility: CLINIC | Age: 64
End: 2019-10-07
Payer: COMMERCIAL

## 2019-10-07 VITALS
HEIGHT: 67 IN | SYSTOLIC BLOOD PRESSURE: 118 MMHG | WEIGHT: 154.4 LBS | HEART RATE: 83 BPM | TEMPERATURE: 98.9 F | BODY MASS INDEX: 24.23 KG/M2 | OXYGEN SATURATION: 95 % | DIASTOLIC BLOOD PRESSURE: 67 MMHG | RESPIRATION RATE: 16 BRPM

## 2019-10-07 DIAGNOSIS — C88.0 WALDENSTROM'S MACROGLOBULINEMIA (HCC): Primary | ICD-10-CM

## 2019-10-07 PROCEDURE — 99214 OFFICE O/P EST MOD 30 MIN: CPT | Performed by: INTERNAL MEDICINE

## 2019-10-07 NOTE — PROGRESS NOTES
[facility] HEMATOLOGY ONCOLOGY SPECIALISTS [place]  [address] [id number] [phone]    Adelaida Primrose, [id number]  10/07/19    Discussion:   In summary, this is a 62-year-old male history of Waldenstroms  Clinically he is doing well  Generally he is able to function without restriction  Recent CBC and chemistry are normal   Quantitative immunoglobulins are stable with mild hypogammaglobulinemia  Free light chain ratio is stable in his previously established range 2 5-5 0  Baseline was 36 0  Most recent imaging was in June 2019 which showed no evidence of lymphadenopathy or organomegaly  He seems to be tolerating Ibrutinib without difficulty and enjoying good benefit  Continuation of treatment is recommended at this time  He reviewed some issues about his financial state which is in some flux  He will be conferring with his  about this and we can refer him to financial counseling at his next visit in 3 months  I discussed the above with the patient  The patient and his brother voiced understanding and agreement   ______________________________________________________________________    Chief Complaint   Patient presents with    Follow-up     waldenstroms macroglobulinemia       HPI:     Waldenstrom's macroglobulinemia ([facility] [address] )    6/8/2018 Biopsy     Overall, the combination of morphologic and immunophenotypic features is consistent with 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 with small cell size and overall low grade appearance  The morphologic features and nonspecific immunophenotype are most consistent with marginal zone lymphoma or lymphoplasmacytic lymphoma  The presence of a monoclonal IgM expressing plasma cell population with expression of the same light chain as the malignant B-cell population in the setting of IgM monoclonal paraprotein may favor lymphoplasmacytic lymphoma, though is not definitively specific     MYD 88 +       6/21/2018 Initial Diagnosis     Waldenstrom's macroglobulinemia [facility])  April 2018 patient had hernia repair  Leukocytosis was noted  Peripheral blood flow cytometry showed findings consistent with monoclonal B-cell lymphocytosis  Additionally, he was found to have a kappa lambda ratio of 36 0  IgM 3700 with reciprocal inhibition  Skeletal survey showed no lytic bone foci  7/11/2018 -  Chemotherapy     Ibrutinib 280 mg p o  daily  Interval History:  Clinically stable  ECOG-  0 - Asymptomatic    Review of Systems   Constitutional: Negative for chills and fever  HENT: Negative for nosebleeds  Eyes: Negative for discharge  Respiratory: Negative for cough and shortness of breath  Cardiovascular: Negative for chest pain  Gastrointestinal: Negative for abdominal pain, constipation and diarrhea  Endocrine: Negative for polydipsia  Genitourinary: Negative for hematuria  Musculoskeletal: Negative for arthralgias  Skin: Negative for color change  Allergic/Immunologic: Negative for immunocompromised state  Neurological: Negative for dizziness and headaches  Hematological: Negative for adenopathy  Psychiatric/Behavioral: Negative for agitation         Past Medical History:   Diagnosis Date    Anemia, unspecified 4/30/2018    Hyperglycemia     Last Assessed:1/18/18    [facility] 6/21/2018     Patient Active Problem List   Diagnosis    Unilateral inguinal hernia without obstruction or gangrene    Inguinal hernia bilateral, non-recurrent    Anemia, unspecified    Monoclonal B-cell lymphocytosis    Fall    Rib pain on left side    Waldenstrom's macroglobulinemia (HCC)    Folliculitis       Current Outpatient Medications:     Acetaminophen (TYLENOL) 325 MG CAPS, Take by mouth, Disp: , Rfl:     Ibrutinib 140 MG CAPS, Take 1 capsule (140 mg total) by mouth daily Take 2 caps by mouth each day , Disp: 60 capsule, Rfl: 3    Multiple Vitamin (MULTI VITAMIN MENS PO), Take 1 tablet by mouth daily, Disp: , Rfl:   No Known Allergies  Past Surgical History:   Procedure Laterality Date    WISDOM TOOTH EXTRACTION      Last Assessed:1/18/18     Social History     Objective:  Vitals:    10/07/19 1400   BP: 118/67   BP Location: Left arm   Pulse: 83   Resp: 16   Temp: 98 9 °F (37 2 °C)   TempSrc: Tympanic Core   SpO2: 95%   Weight: 70 kg (154 lb 6 4 oz)   Height: 5' 7" (1 702 m)     Physical Exam   Constitutional: He is oriented to person, place, and time  He appears well-developed  HENT:   Head: Normocephalic  Eyes: Pupils are equal, round, and reactive to light  Neck: Neck supple  Cardiovascular: Normal rate and regular rhythm  No murmur heard  Pulmonary/Chest: Breath sounds normal  He has no wheezes  He has no rales  Abdominal: Soft  There is no tenderness  Musculoskeletal: Normal range of motion  He exhibits no edema or tenderness  Lymphadenopathy:     He has no cervical adenopathy  Neurological: He is alert and oriented to person, place, and time  He has normal reflexes  No cranial nerve deficit  Skin: No rash noted  No erythema  Psychiatric: He has a normal mood and affect  His behavior is normal          Labs: I personally reviewed the labs and imaging pertinent to this patient care

## 2019-10-24 DIAGNOSIS — C88.0 WALDENSTROM'S MACROGLOBULINEMIA (HCC): ICD-10-CM

## 2019-10-31 ENCOUNTER — TELEPHONE (OUTPATIENT)
Dept: HEMATOLOGY ONCOLOGY | Facility: CLINIC | Age: 64
End: 2019-10-31

## 2019-10-31 NOTE — TELEPHONE ENCOUNTER
Task received from CARLOS Kennedy and routed to Radha Singh, RN to call back pharm with clarification of medication  Please call Richard Ridley at [facility] at [phone]

## 2019-10-31 NOTE — TELEPHONE ENCOUNTER
Brandon Skaggs from [address] called  in to clarify information for the prescription for Ibrutinib    A good number [phone]

## 2019-11-15 ENCOUNTER — TELEPHONE (OUTPATIENT)
Dept: HEMATOLOGY ONCOLOGY | Facility: CLINIC | Age: 64
End: 2019-11-15

## 2019-11-15 NOTE — TELEPHONE ENCOUNTER
Lord Zheng called wanting to talk about how he is going to pay for the medication that he currently has  He stated that he would like a financial counselor to call him to see what we can help him for his medication  Please review and contact patient to discuss further

## 2019-11-19 ENCOUNTER — DOCUMENTATION (OUTPATIENT)
Dept: HEMATOLOGY ONCOLOGY | Facility: CLINIC | Age: 64
End: 2019-11-19

## 2019-12-31 ENCOUNTER — APPOINTMENT (OUTPATIENT)
Dept: LAB | Facility: CLINIC | Age: 64
End: 2019-12-31
Payer: COMMERCIAL

## 2019-12-31 DIAGNOSIS — C88.0 WALDENSTROM'S MACROGLOBULINEMIA (HCC): ICD-10-CM

## 2019-12-31 LAB
ALBUMIN SERPL BCP-MCNC: 4.1 G/DL (ref 3.5–5)
ALP SERPL-CCNC: 76 U/L (ref 46–116)
ALT SERPL W P-5'-P-CCNC: 30 U/L (ref 12–78)
ANION GAP SERPL CALCULATED.3IONS-SCNC: 5 MMOL/L (ref 4–13)
AST SERPL W P-5'-P-CCNC: 14 U/L (ref 5–45)
BASOPHILS # BLD AUTO: 0.08 THOUSANDS/ΜL (ref 0–0.1)
BASOPHILS NFR BLD AUTO: 1 % (ref 0–1)
BILIRUB SERPL-MCNC: 0.63 MG/DL (ref 0.2–1)
BUN SERPL-MCNC: 12 MG/DL (ref 5–25)
CALCIUM SERPL-MCNC: 8.9 MG/DL (ref 8.3–10.1)
CHLORIDE SERPL-SCNC: 109 MMOL/L (ref 100–108)
CO2 SERPL-SCNC: 28 MMOL/L (ref 21–32)
CREAT SERPL-MCNC: 0.86 MG/DL (ref 0.6–1.3)
EOSINOPHIL # BLD AUTO: 0.15 THOUSAND/ΜL (ref 0–0.61)
EOSINOPHIL NFR BLD AUTO: 2 % (ref 0–6)
ERYTHROCYTE [DISTWIDTH] IN BLOOD BY AUTOMATED COUNT: 12.4 % (ref 11.6–15.1)
GFR SERPL CREATININE-BSD FRML MDRD: 93 ML/MIN/1.73SQ M
GLUCOSE P FAST SERPL-MCNC: 80 MG/DL (ref 65–99)
HCT VFR BLD AUTO: 45 % (ref 36.5–49.3)
HGB BLD-MCNC: 15.4 G/DL (ref 12–17)
IGA SERPL-MCNC: 30 MG/DL (ref 70–400)
IGG SERPL-MCNC: 325 MG/DL (ref 700–1600)
IGM SERPL-MCNC: 138 MG/DL (ref 40–230)
IMM GRANULOCYTES # BLD AUTO: 0.04 THOUSAND/UL (ref 0–0.2)
IMM GRANULOCYTES NFR BLD AUTO: 1 % (ref 0–2)
LYMPHOCYTES # BLD AUTO: 1.4 THOUSANDS/ΜL (ref 0.6–4.47)
LYMPHOCYTES NFR BLD AUTO: 20 % (ref 14–44)
MCH RBC QN AUTO: 31.4 PG (ref 26.8–34.3)
MCHC RBC AUTO-ENTMCNC: 34.2 G/DL (ref 31.4–37.4)
MCV RBC AUTO: 92 FL (ref 82–98)
MONOCYTES # BLD AUTO: 0.7 THOUSAND/ΜL (ref 0.17–1.22)
MONOCYTES NFR BLD AUTO: 10 % (ref 4–12)
NEUTROPHILS # BLD AUTO: 4.82 THOUSANDS/ΜL (ref 1.85–7.62)
NEUTS SEG NFR BLD AUTO: 66 % (ref 43–75)
NRBC BLD AUTO-RTO: 0 /100 WBCS
PLATELET # BLD AUTO: 205 THOUSANDS/UL (ref 149–390)
PMV BLD AUTO: 11 FL (ref 8.9–12.7)
POTASSIUM SERPL-SCNC: 3.8 MMOL/L (ref 3.5–5.3)
PROT SERPL-MCNC: 6.6 G/DL (ref 6.4–8.2)
RBC # BLD AUTO: 4.91 MILLION/UL (ref 3.88–5.62)
SODIUM SERPL-SCNC: 142 MMOL/L (ref 136–145)
WBC # BLD AUTO: 7.19 THOUSAND/UL (ref 4.31–10.16)

## 2019-12-31 PROCEDURE — 36415 COLL VENOUS BLD VENIPUNCTURE: CPT

## 2019-12-31 PROCEDURE — 80053 COMPREHEN METABOLIC PANEL: CPT

## 2019-12-31 PROCEDURE — 82784 ASSAY IGA/IGD/IGG/IGM EACH: CPT

## 2019-12-31 PROCEDURE — 83883 ASSAY NEPHELOMETRY NOT SPEC: CPT

## 2019-12-31 PROCEDURE — 85025 COMPLETE CBC W/AUTO DIFF WBC: CPT

## 2020-01-03 LAB
KAPPA LC FREE SER-MCNC: 15.6 MG/L (ref 3.3–19.4)
KAPPA LC FREE/LAMBDA FREE SER: 5.57 {RATIO} (ref 0.26–1.65)
LAMBDA LC FREE SERPL-MCNC: 2.8 MG/L (ref 5.7–26.3)

## 2020-01-20 ENCOUNTER — OFFICE VISIT (OUTPATIENT)
Dept: HEMATOLOGY ONCOLOGY | Facility: CLINIC | Age: 65
End: 2020-01-20
Payer: COMMERCIAL

## 2020-01-20 VITALS
WEIGHT: 157.6 LBS | DIASTOLIC BLOOD PRESSURE: 74 MMHG | SYSTOLIC BLOOD PRESSURE: 126 MMHG | BODY MASS INDEX: 24.74 KG/M2 | HEART RATE: 80 BPM | HEIGHT: 67 IN | OXYGEN SATURATION: 98 % | RESPIRATION RATE: 17 BRPM | TEMPERATURE: 97.8 F

## 2020-01-20 DIAGNOSIS — C88.0 WALDENSTROM'S MACROGLOBULINEMIA (HCC): Primary | ICD-10-CM

## 2020-01-20 PROCEDURE — 99214 OFFICE O/P EST MOD 30 MIN: CPT | Performed by: INTERNAL MEDICINE

## 2020-01-20 PROCEDURE — 3008F BODY MASS INDEX DOCD: CPT | Performed by: PHYSICIAN ASSISTANT

## 2020-01-20 NOTE — PROGRESS NOTES
[facility] HEMATOLOGY ONCOLOGY SPECIALISTS [place]  [address] [address]  [phone]  [phone]    Mayra Lee, [id number]  01/20/20    Discussion:   In summary, this is a 72-year-old male history Waldenstroms  He is currently on Ibrutinib  He is tolerating this without difficulty  IgM level is normal   IgG and IgA or depressed, a toxicity of treatment  He is asymptomatic  Observation favored  Free light chain ratio 5 5, stable in his previously established range, 4 8-6 0   CBC and CMP are normal   Overall I think he is doing quite well  I would make any changes in his treatment at this time  If all is well I will see him back in 3 months for review  He is up-to-date with colonoscopy screening  I discussed the above with the patient  The patient and his brother voiced understanding and agreement   ______________________________________________________________________    Chief Complaint   Patient presents with    Follow-up       HPI:     Waldenstrom's macroglobulinemia ([facility] [address] )    6/8/2018 Biopsy     Overall, the combination of morphologic and immunophenotypic features is consistent with 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 with small cell size and overall low grade appearance  The morphologic features and nonspecific immunophenotype are most consistent with marginal zone lymphoma or lymphoplasmacytic lymphoma  The presence of a monoclonal IgM expressing plasma cell population with expression of the same light chain as the malignant B-cell population in the setting of IgM monoclonal paraprotein may favor lymphoplasmacytic lymphoma, though is not definitively specific  MYD 88 +       6/21/2018 Initial Diagnosis     Waldenstrom's macroglobulinemia [facility])  April 2018 patient had hernia repair  Leukocytosis was noted  Peripheral blood flow cytometry showed findings consistent with monoclonal B-cell lymphocytosis    Additionally, he was found to have a kappa lambda ratio of 36 0  IgM 3700 with reciprocal inhibition  Skeletal survey showed no lytic bone foci  7/11/2018 -  Chemotherapy     Ibrutinib 280 mg p o  daily  Interval History:  Clinically stable  ECOG-  0 - Asymptomatic    Review of Systems   Constitutional: Negative for chills and fever  HENT: Negative for nosebleeds  Eyes: Negative for discharge  Respiratory: Negative for cough and shortness of breath  Cardiovascular: Negative for chest pain  Gastrointestinal: Negative for abdominal pain, constipation and diarrhea  Endocrine: Negative for polydipsia  Genitourinary: Negative for hematuria  Musculoskeletal: Negative for arthralgias  Skin: Negative for color change  Allergic/Immunologic: Negative for immunocompromised state  Neurological: Negative for dizziness and headaches  Hematological: Negative for adenopathy  Psychiatric/Behavioral: Negative for agitation         Past Medical History:   Diagnosis Date    Anemia, unspecified 4/30/2018    Hyperglycemia     Last Assessed:1/18/18    [facility] 6/[facility]2018     Patient Active Problem List   Diagnosis    Unilateral inguinal hernia without obstruction or gangrene    Inguinal hernia bilateral, non-recurrent    Anemia, unspecified    Monoclonal B-cell lymphocytosis    Fall    Rib pain on left side    Waldenstrom's macroglobulinemia (HCC)    Folliculitis       Current Outpatient Medications:     Acetaminophen (TYLENOL) 325 MG CAPS, Take by mouth as needed , Disp: , Rfl:     Ibrutinib 140 MG CAPS, Take 1 capsule (140 mg total) by mouth daily Take 2 caps by mouth each day , Disp: 60 capsule, Rfl: 3    Multiple Vitamin (MULTI VITAMIN MENS PO), Take 1 tablet by mouth daily, Disp: , Rfl:   No Known Allergies  Past Surgical History:   Procedure Laterality Date    WISDOM TOOTH EXTRACTION      Last Assessed:1/18/18     Social History     Objective:  Vitals:    01/20/20 0902   BP: 126/74 BP Location: Left arm   Patient Position: Sitting   Pulse: 80   Resp: 17   Temp: 97 8 °F (36 6 °C)   TempSrc: Tympanic   SpO2: 98%   Weight: 71 5 kg (157 lb 9 6 oz)   Height: 5' 7 3" (1 709 m)     Physical Exam   Constitutional: He is oriented to person, place, and time  He appears well-developed  HENT:   Head: Normocephalic  Eyes: Pupils are equal, round, and reactive to light  Neck: Neck supple  Cardiovascular: Normal rate and regular rhythm  No murmur heard  Pulmonary/Chest: Breath sounds normal  He has no wheezes  He has no rales  Abdominal: Soft  There is no tenderness  Musculoskeletal: Normal range of motion  He exhibits no edema or tenderness  Lymphadenopathy:     He has no cervical adenopathy  Neurological: He is alert and oriented to person, place, and time  He has normal reflexes  No cranial nerve deficit  Skin: No rash noted  No erythema  Psychiatric: He has a normal mood and affect  His behavior is normal          Labs: I personally reviewed the labs and imaging pertinent to this patient care

## 2020-02-25 DIAGNOSIS — C88.0 WALDENSTROM'S MACROGLOBULINEMIA (HCC): ICD-10-CM

## 2020-04-13 ENCOUNTER — TELEPHONE (OUTPATIENT)
Dept: OTHER | Facility: OTHER | Age: 65
End: 2020-04-13

## 2020-04-14 ENCOUNTER — TELEPHONE (OUTPATIENT)
Dept: HEMATOLOGY ONCOLOGY | Facility: CLINIC | Age: 65
End: 2020-04-14

## 2020-04-15 ENCOUNTER — TELEPHONE (OUTPATIENT)
Dept: HEMATOLOGY ONCOLOGY | Facility: MEDICAL CENTER | Age: 65
End: 2020-04-15

## 2020-05-21 ENCOUNTER — TELEPHONE (OUTPATIENT)
Dept: HEMATOLOGY ONCOLOGY | Facility: CLINIC | Age: 65
End: 2020-05-21

## 2020-06-11 ENCOUNTER — TELEPHONE (OUTPATIENT)
Dept: HEMATOLOGY ONCOLOGY | Facility: CLINIC | Age: 65
End: 2020-06-11

## 2020-06-11 ENCOUNTER — APPOINTMENT (OUTPATIENT)
Dept: LAB | Age: 65
End: 2020-06-11
Payer: COMMERCIAL

## 2020-06-11 DIAGNOSIS — C88.0 WALDENSTROM'S MACROGLOBULINEMIA (HCC): ICD-10-CM

## 2020-06-11 LAB
ALBUMIN SERPL BCP-MCNC: 4.1 G/DL (ref 3.5–5)
ALP SERPL-CCNC: 81 U/L (ref 46–116)
ALT SERPL W P-5'-P-CCNC: 33 U/L (ref 12–78)
ANION GAP SERPL CALCULATED.3IONS-SCNC: 7 MMOL/L (ref 4–13)
AST SERPL W P-5'-P-CCNC: 23 U/L (ref 5–45)
BASOPHILS # BLD AUTO: 0.07 THOUSANDS/ΜL (ref 0–0.1)
BASOPHILS NFR BLD AUTO: 1 % (ref 0–1)
BILIRUB SERPL-MCNC: 0.8 MG/DL (ref 0.2–1)
BUN SERPL-MCNC: 11 MG/DL (ref 5–25)
CALCIUM SERPL-MCNC: 9 MG/DL (ref 8.3–10.1)
CHLORIDE SERPL-SCNC: 102 MMOL/L (ref 100–108)
CO2 SERPL-SCNC: 27 MMOL/L (ref 21–32)
CREAT SERPL-MCNC: 0.84 MG/DL (ref 0.6–1.3)
EOSINOPHIL # BLD AUTO: 0.14 THOUSAND/ΜL (ref 0–0.61)
EOSINOPHIL NFR BLD AUTO: 2 % (ref 0–6)
ERYTHROCYTE [DISTWIDTH] IN BLOOD BY AUTOMATED COUNT: 12.3 % (ref 11.6–15.1)
GFR SERPL CREATININE-BSD FRML MDRD: 94 ML/MIN/1.73SQ M
GLUCOSE P FAST SERPL-MCNC: 71 MG/DL (ref 65–99)
HCT VFR BLD AUTO: 45.1 % (ref 36.5–49.3)
HGB BLD-MCNC: 15.9 G/DL (ref 12–17)
IGA SERPL-MCNC: 32 MG/DL (ref 70–400)
IGG SERPL-MCNC: 306 MG/DL (ref 700–1600)
IGM SERPL-MCNC: 124 MG/DL (ref 40–230)
IMM GRANULOCYTES # BLD AUTO: 0.03 THOUSAND/UL (ref 0–0.2)
IMM GRANULOCYTES NFR BLD AUTO: 1 % (ref 0–2)
LYMPHOCYTES # BLD AUTO: 1.35 THOUSANDS/ΜL (ref 0.6–4.47)
LYMPHOCYTES NFR BLD AUTO: 23 % (ref 14–44)
MCH RBC QN AUTO: 32.1 PG (ref 26.8–34.3)
MCHC RBC AUTO-ENTMCNC: 35.3 G/DL (ref 31.4–37.4)
MCV RBC AUTO: 91 FL (ref 82–98)
MONOCYTES # BLD AUTO: 0.58 THOUSAND/ΜL (ref 0.17–1.22)
MONOCYTES NFR BLD AUTO: 10 % (ref 4–12)
NEUTROPHILS # BLD AUTO: 3.78 THOUSANDS/ΜL (ref 1.85–7.62)
NEUTS SEG NFR BLD AUTO: 63 % (ref 43–75)
NRBC BLD AUTO-RTO: 0 /100 WBCS
PLATELET # BLD AUTO: 219 THOUSANDS/UL (ref 149–390)
PMV BLD AUTO: 10.7 FL (ref 8.9–12.7)
POTASSIUM SERPL-SCNC: 4.1 MMOL/L (ref 3.5–5.3)
PROT SERPL-MCNC: 6.8 G/DL (ref 6.4–8.2)
RBC # BLD AUTO: 4.96 MILLION/UL (ref 3.88–5.62)
SODIUM SERPL-SCNC: 136 MMOL/L (ref 136–145)
WBC # BLD AUTO: 5.95 THOUSAND/UL (ref 4.31–10.16)

## 2020-06-11 PROCEDURE — 82784 ASSAY IGA/IGD/IGG/IGM EACH: CPT

## 2020-06-11 PROCEDURE — 80053 COMPREHEN METABOLIC PANEL: CPT

## 2020-06-11 PROCEDURE — 83883 ASSAY NEPHELOMETRY NOT SPEC: CPT

## 2020-06-11 PROCEDURE — 85025 COMPLETE CBC W/AUTO DIFF WBC: CPT

## 2020-06-11 PROCEDURE — 36415 COLL VENOUS BLD VENIPUNCTURE: CPT

## 2020-06-12 LAB
KAPPA LC FREE SER-MCNC: 12.6 MG/L (ref 3.3–19.4)
KAPPA LC FREE/LAMBDA FREE SER: 5.04 {RATIO} (ref 0.26–1.65)
LAMBDA LC FREE SERPL-MCNC: 2.5 MG/L (ref 5.7–26.3)

## 2020-06-18 ENCOUNTER — TELEMEDICINE (OUTPATIENT)
Dept: HEMATOLOGY ONCOLOGY | Facility: CLINIC | Age: 65
End: 2020-06-18
Payer: COMMERCIAL

## 2020-06-18 DIAGNOSIS — C88.0 WALDENSTROM'S MACROGLOBULINEMIA (HCC): ICD-10-CM

## 2020-06-18 PROCEDURE — 99213 OFFICE O/P EST LOW 20 MIN: CPT | Performed by: PHYSICIAN ASSISTANT

## 2020-06-26 ENCOUNTER — DOCUMENTATION (OUTPATIENT)
Dept: HEMATOLOGY ONCOLOGY | Facility: CLINIC | Age: 65
End: 2020-06-26

## 2020-06-26 ENCOUNTER — TELEPHONE (OUTPATIENT)
Dept: HEMATOLOGY ONCOLOGY | Facility: CLINIC | Age: 65
End: 2020-06-26

## 2020-09-11 ENCOUNTER — TELEPHONE (OUTPATIENT)
Dept: HEMATOLOGY ONCOLOGY | Facility: CLINIC | Age: 65
End: 2020-09-11

## 2020-09-11 NOTE — TELEPHONE ENCOUNTER
Patient called to check if his appt on 9-24-20 is a virtual visit  I informed patient it is a in person appt  Patient understood

## 2020-09-16 ENCOUNTER — APPOINTMENT (OUTPATIENT)
Dept: LAB | Facility: CLINIC | Age: 65
End: 2020-09-16
Payer: COMMERCIAL

## 2020-09-16 DIAGNOSIS — C88.0 WALDENSTROM'S MACROGLOBULINEMIA (HCC): ICD-10-CM

## 2020-09-16 LAB
ALBUMIN SERPL BCP-MCNC: 4.2 G/DL (ref 3.5–5)
ALP SERPL-CCNC: 79 U/L (ref 46–116)
ALT SERPL W P-5'-P-CCNC: 28 U/L (ref 12–78)
ANION GAP SERPL CALCULATED.3IONS-SCNC: 5 MMOL/L (ref 4–13)
AST SERPL W P-5'-P-CCNC: 17 U/L (ref 5–45)
BASOPHILS # BLD AUTO: 0.07 THOUSANDS/ΜL (ref 0–0.1)
BASOPHILS NFR BLD AUTO: 1 % (ref 0–1)
BILIRUB SERPL-MCNC: 0.82 MG/DL (ref 0.2–1)
BUN SERPL-MCNC: 10 MG/DL (ref 5–25)
CALCIUM SERPL-MCNC: 8.8 MG/DL (ref 8.3–10.1)
CHLORIDE SERPL-SCNC: 108 MMOL/L (ref 100–108)
CO2 SERPL-SCNC: 25 MMOL/L (ref 21–32)
CREAT SERPL-MCNC: 0.91 MG/DL (ref 0.6–1.3)
EOSINOPHIL # BLD AUTO: 0.13 THOUSAND/ΜL (ref 0–0.61)
EOSINOPHIL NFR BLD AUTO: 2 % (ref 0–6)
ERYTHROCYTE [DISTWIDTH] IN BLOOD BY AUTOMATED COUNT: 12.2 % (ref 11.6–15.1)
GFR SERPL CREATININE-BSD FRML MDRD: 89 ML/MIN/1.73SQ M
GLUCOSE P FAST SERPL-MCNC: 141 MG/DL (ref 65–99)
HCT VFR BLD AUTO: 45.5 % (ref 36.5–49.3)
HGB BLD-MCNC: 16 G/DL (ref 12–17)
IGA SERPL-MCNC: 33 MG/DL (ref 70–400)
IGG SERPL-MCNC: 301 MG/DL (ref 700–1600)
IGM SERPL-MCNC: 127 MG/DL (ref 40–230)
IMM GRANULOCYTES # BLD AUTO: 0.05 THOUSAND/UL (ref 0–0.2)
IMM GRANULOCYTES NFR BLD AUTO: 1 % (ref 0–2)
LYMPHOCYTES # BLD AUTO: 1.41 THOUSANDS/ΜL (ref 0.6–4.47)
LYMPHOCYTES NFR BLD AUTO: 22 % (ref 14–44)
MCH RBC QN AUTO: 31.6 PG (ref 26.8–34.3)
MCHC RBC AUTO-ENTMCNC: 35.2 G/DL (ref 31.4–37.4)
MCV RBC AUTO: 90 FL (ref 82–98)
MONOCYTES # BLD AUTO: 0.62 THOUSAND/ΜL (ref 0.17–1.22)
MONOCYTES NFR BLD AUTO: 10 % (ref 4–12)
NEUTROPHILS # BLD AUTO: 4.19 THOUSANDS/ΜL (ref 1.85–7.62)
NEUTS SEG NFR BLD AUTO: 64 % (ref 43–75)
NRBC BLD AUTO-RTO: 0 /100 WBCS
PLATELET # BLD AUTO: 209 THOUSANDS/UL (ref 149–390)
PMV BLD AUTO: 11.2 FL (ref 8.9–12.7)
POTASSIUM SERPL-SCNC: 3.6 MMOL/L (ref 3.5–5.3)
PROT SERPL-MCNC: 6.9 G/DL (ref 6.4–8.2)
RBC # BLD AUTO: 5.07 MILLION/UL (ref 3.88–5.62)
SODIUM SERPL-SCNC: 138 MMOL/L (ref 136–145)
WBC # BLD AUTO: 6.47 THOUSAND/UL (ref 4.31–10.16)

## 2020-09-16 PROCEDURE — 85025 COMPLETE CBC W/AUTO DIFF WBC: CPT

## 2020-09-16 PROCEDURE — 36415 COLL VENOUS BLD VENIPUNCTURE: CPT

## 2020-09-16 PROCEDURE — 82784 ASSAY IGA/IGD/IGG/IGM EACH: CPT

## 2020-09-16 PROCEDURE — 83883 ASSAY NEPHELOMETRY NOT SPEC: CPT

## 2020-09-16 PROCEDURE — 80053 COMPREHEN METABOLIC PANEL: CPT

## 2020-09-17 LAB
KAPPA LC FREE SER-MCNC: 12.5 MG/L (ref 3.3–19.4)
KAPPA LC FREE/LAMBDA FREE SER: 3.57 {RATIO} (ref 0.26–1.65)
LAMBDA LC FREE SERPL-MCNC: 3.5 MG/L (ref 5.7–26.3)

## 2020-09-22 ENCOUNTER — CLINICAL SUPPORT (OUTPATIENT)
Dept: INTERNAL MEDICINE CLINIC | Facility: CLINIC | Age: 65
End: 2020-09-22
Payer: COMMERCIAL

## 2020-09-22 VITALS — TEMPERATURE: 99.3 F

## 2020-09-22 DIAGNOSIS — Z23 NEED FOR INFLUENZA VACCINATION: Primary | ICD-10-CM

## 2020-09-22 PROCEDURE — 90682 RIV4 VACC RECOMBINANT DNA IM: CPT

## 2020-09-22 PROCEDURE — 90471 IMMUNIZATION ADMIN: CPT

## 2020-09-24 ENCOUNTER — OFFICE VISIT (OUTPATIENT)
Dept: HEMATOLOGY ONCOLOGY | Facility: CLINIC | Age: 65
End: 2020-09-24
Payer: COMMERCIAL

## 2020-09-24 VITALS
RESPIRATION RATE: 18 BRPM | DIASTOLIC BLOOD PRESSURE: 70 MMHG | TEMPERATURE: 96.5 F | SYSTOLIC BLOOD PRESSURE: 126 MMHG | OXYGEN SATURATION: 97 % | HEIGHT: 67 IN | WEIGHT: 152.8 LBS | HEART RATE: 93 BPM | BODY MASS INDEX: 23.98 KG/M2

## 2020-09-24 DIAGNOSIS — C88.0 WALDENSTROM'S MACROGLOBULINEMIA (HCC): Primary | ICD-10-CM

## 2020-09-24 PROCEDURE — 99214 OFFICE O/P EST MOD 30 MIN: CPT | Performed by: NURSE PRACTITIONER

## 2020-09-24 NOTE — PROGRESS NOTES
[facility]  [address] [place] [address] [phone]  Progress Note  Reita Galeazzi, 1955, [id number]  9/24/2020    Assessment/Plan:  1  Waldenstrom's macroglobulinemia [facility])   Patient is a 82-year-old male with a history of Waldenstrom's macroglobulinemia  He has been doing well on ibrutinib 280 mg p o  daily  We reviewed his labs his immunoglobulin light chains essentially stable  His kappa lambda ratio is 3 57  CBC is stable and within normal limits  Glucose on his CMP is slightly elevated 141  Patient states he did not fast for the blood work  Overall patient is doing well and is not restricted in his function  We will see patient in 3 months with the following labs  Patient verbalized understanding and is in agreement with the plan  - CBC and differential; Future  - Comprehensive metabolic panel; Future  - Immunoglobulin free LT chains blood; Future  - IgG, IgA, IgM; Future      The patient is scheduled for follow-up in approximately 3 months with Dr Nichole Toussaint or me  Patient voiced agreement and understanding to the above  Patient knows to call the Hematology/Oncology office with any questions and concerns regarding the above  Goals and Barriers:    Current Goal:   Prolong Survival from Cancer  Barriers: None  Patient's Capacity to Self Care:  Patient is able to self care   -------------------------------------------------------------------------------------------------------    Chief Complaint   Patient presents with    Follow-up       History of present illness/Cancer History:   Oncology History   Waldenstrom's macroglobulinemia ([facility] [address] )   6/8/2018 Biopsy    Overall, the combination of morphologic and immunophenotypic features is consistent with 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 with small cell size and overall low grade appearance   The morphologic features and nonspecific immunophenotype are most consistent with marginal zone lymphoma or lymphoplasmacytic lymphoma  The presence of a monoclonal IgM expressing plasma cell population with expression of the same light chain as the malignant B-cell population in the setting of IgM monoclonal paraprotein may favor lymphoplasmacytic lymphoma, though is not definitively specific  MYD 88 +      2018 Initial Diagnosis    Waldenstrom's macroglobulinemia [facility])  2018 patient had hernia repair  Leukocytosis was noted  Peripheral blood flow cytometry showed findings consistent with monoclonal B-cell lymphocytosis  Additionally, he was found to have a kappa lambda ratio of 36 0  IgM 3700 with reciprocal inhibition  Skeletal survey showed no lytic bone foci  2018 -  Chemotherapy    Ibrutinib 280 mg p o  daily  Cancer Staging  No matching staging information was found for the patient  ECO - Asymptomatic     Review of Systems   Constitutional: Negative for activity change, appetite change, fatigue, fever and unexpected weight change  Respiratory: Negative for cough and shortness of breath  Cardiovascular: Negative for chest pain and leg swelling  Gastrointestinal: Negative for abdominal pain, constipation, diarrhea and nausea  Endocrine: Negative for cold intolerance and heat intolerance  Musculoskeletal: Negative for arthralgias and myalgias  Skin: Negative  Neurological: Negative for dizziness, weakness and headaches  Hematological: Negative for adenopathy  Does not bruise/bleed easily           Current Outpatient Medications:     Acetaminophen (TYLENOL) 325 MG CAPS, Take by mouth as needed , Disp: , Rfl:     Ibrutinib 140 MG CAPS, Take 1 capsule (140 mg total) by mouth daily Take 2 caps by mouth each day , Disp: 60 capsule, Rfl: 3    Multiple Vitamin (MULTI VITAMIN MENS PO), Take 1 tablet by mouth daily, Disp: , Rfl:     No Known Allergies    Advance Directive and Living Will:        Objective:   BP 126/70 (BP Location: Left arm, Patient Position: Sitting)   Pulse 93   Temp (!) 96 5 °F (35 8 °C) (Tympanic)   Resp 18   Ht 5' 7 3" (1 709 m)   Wt 69 3 kg (152 lb 12 8 oz)   SpO2 97%   BMI 23 72 kg/m²   Wt Readings from Last 6 Encounters:   09/24/20 69 3 kg (152 lb 12 8 oz)   01/20/20 71 5 kg (157 lb 9 6 oz)   10/07/19 70 kg (154 lb 6 4 oz)   07/08/19 68 9 kg (152 lb)   02/28/19 72 9 kg (160 lb 12 8 oz)   01/29/19 71 5 kg (157 lb 9 6 oz)       Physical Exam  Vitals signs reviewed  Constitutional:       Appearance: He is well-developed  HENT:      Head: Normocephalic and atraumatic  Eyes:      Pupils: Pupils are equal, round, and reactive to light  Neck:      Musculoskeletal: Normal range of motion  Cardiovascular:      Rate and Rhythm: Normal rate and regular rhythm  Heart sounds: Normal heart sounds  Pulmonary:      Effort: Pulmonary effort is normal  No respiratory distress  Breath sounds: Normal breath sounds  Abdominal:      General: Bowel sounds are normal       Palpations: Abdomen is soft  Musculoskeletal: Normal range of motion  Lymphadenopathy:      Cervical: No cervical adenopathy  Skin:     General: Skin is warm and dry  Capillary Refill: Capillary refill takes less than 2 seconds  Neurological:      Mental Status: He is alert and oriented to person, place, and time     Psychiatric:         Behavior: Behavior normal          Pertinent Laboratory Results and Imaging Review:  Appointment on 09/16/2020   Component Date Value Ref Range Status    WBC 09/16/2020 6 47  4 31 - 10 16 Thousand/uL Final    RBC 09/16/2020 5 07  3 88 - 5 62 Million/uL Final    Hemoglobin 09/16/2020 16 0  12 0 - 17 0 g/dL Final    Hematocrit 09/16/2020 45 5  36 5 - 49 3 % Final    MCV 09/16/2020 90  82 - 98 fL Final    MCH 09/16/2020 31 6  26 8 - 34 3 pg Final    MCHC 09/16/2020 35 2  31 4 - 37 4 g/dL Final    RDW 09/16/2020 12 2  11 6 - 15 1 % Final    MPV 09/16/2020 11 2  8 9 - 12 7 fL Final    Platelets 85/86/3581 209  149 - 390 Thousands/uL Final    nRBC 09/16/2020 0  /100 WBCs Final    Neutrophils Relative 09/16/2020 64  43 - 75 % Final    Immat GRANS % 09/16/2020 1  0 - 2 % Final    Lymphocytes Relative 09/16/2020 22  14 - 44 % Final    Monocytes Relative 09/16/2020 10  4 - 12 % Final    Eosinophils Relative 09/16/2020 2  0 - 6 % Final    Basophils Relative 09/16/2020 1  0 - 1 % Final    Neutrophils Absolute 09/16/2020 4 19  1 85 - 7 62 Thousands/µL Final    Immature Grans Absolute 09/16/2020 0 05  0 00 - 0 20 Thousand/uL Final    Lymphocytes Absolute 09/16/2020 1 41  0 60 - 4 47 Thousands/µL Final    Monocytes Absolute 09/16/2020 0 62  0 17 - 1 22 Thousand/µL Final    Eosinophils Absolute 09/16/2020 0 13  0 00 - 0 61 Thousand/µL Final    Basophils Absolute 09/16/2020 0 07  0 00 - 0 10 Thousands/µL Final    Sodium 09/16/2020 138  136 - 145 mmol/L Final    Potassium 09/16/2020 3 6  3 5 - 5 3 mmol/L Final    Chloride 09/16/2020 108  100 - 108 mmol/L Final    CO2 09/16/2020 25  21 - 32 mmol/L Final    ANION GAP 09/16/2020 5  4 - 13 mmol/L Final    BUN 09/16/2020 10  5 - 25 mg/dL Final    Creatinine 09/16/2020 0 91  0 60 - 1 30 mg/dL Final    Standardized to IDMS reference method    Glucose, Fasting 09/16/2020 141* 65 - 99 mg/dL Final    Specimen collection should occur prior to Sulfasalazine administration due to the potential for falsely depressed results  Specimen collection should occur prior to Sulfapyridine administration due to the potential for falsely elevated results   Calcium 09/16/2020 8 8  8 3 - 10 1 mg/dL Final    AST 09/16/2020 17  5 - 45 U/L Final    Specimen collection should occur prior to Sulfasalazine administration due to the potential for falsely depressed results       ALT 09/16/2020 28  12 - 78 U/L Final    Specimen collection should occur prior to Sulfasalazine and/or Sulfapyridine administration due to the potential for falsely depressed results   Alkaline Phosphatase 09/16/2020 79  46 - 116 U/L Final    Total Protein 09/16/2020 6 9  6 4 - 8 2 g/dL Final    Albumin 09/16/2020 4 2  3 5 - 5 0 g/dL Final    Total Bilirubin 09/16/2020 0 82  0 20 - 1 00 mg/dL Final    Use of this assay is not recommended for patients undergoing treatment with eltrombopag due to the potential for falsely elevated results   eGFR 09/16/2020 89  ml/min/1 73sq m Final    IGA 09/16/2020 33 0* 70 0 - 400 0 mg/dL Final    IGG 09/16/2020 301 0* 700 0-1,600 0 mg/dL Final    IGM 09/16/2020 127 0  40 0 - 230 0 mg/dL Final    Ig Kappa Free Light Chain 09/16/2020 12 5  3 3 - 19 4 mg/L Final    Ig Lambda Free Light Chain 09/16/2020 3 5* 5 7 - 26 3 mg/L Final    Kappa/Lambda FluidC Ratio 09/16/2020 3 57* 0 26 - 1 65 Final       The following historical data was reviewed      Past Medical History:   Diagnosis Date    Anemia, unspecified 4/30/2018    Hyperglycemia     Last Assessed:1/18/18    Waldenstrom's macroglobulinemia ([facility] [address] ) 6/21/2018       Past Surgical History:   Procedure Laterality Date    WISDOM TOOTH EXTRACTION      Last Assessed:1/18/18       Social History     Socioeconomic History    Marital status: Single     Spouse name: Not on file    Number of children: Not on file    Years of education: Not on file    Highest education level: Not on file   Occupational History    Not on file   Social Needs    Financial resource strain: Not on file    Food insecurity     Worry: Not on file     Inability: Not on file    Transportation needs     Medical: Not on file     Non-medical: Not on file   Tobacco Use    Smoking status: Never Smoker    Smokeless tobacco: Former User   Substance and Sexual Activity    Alcohol use: No    Drug use: No    Sexual activity: Not on file   Lifestyle    Physical activity     Days per week: Not on file     Minutes per session: Not on file    Stress: Not on file   Relationships    Social connections     Talks on phone: Not on file     Gets together: Not on file     Attends Restoration service: Not on file     Active member of club or organization: Not on file     Attends meetings of clubs or organizations: Not on file     Relationship status: Not on file    Intimate partner violence     Fear of current or ex partner: Not on file     Emotionally abused: Not on file     Physically abused: Not on file     Forced sexual activity: Not on file   Other Topics Concern    Not on file   Social History Narrative    Caffeine use       Family History   Problem Relation Age of Onset    Parkinsonism Mother     Thyroid disease Mother     Diabetes Father     Other Father         Heart Artery Stent       Please note: This report has been generated by a voice recognition software system  Therefore there may be syntax, spelling, and/or grammatical errors  Please call if you have any questions

## 2020-10-16 DIAGNOSIS — C88.0 WALDENSTROM'S MACROGLOBULINEMIA (HCC): ICD-10-CM

## 2020-10-19 ENCOUNTER — TELEPHONE (OUTPATIENT)
Dept: HEMATOLOGY ONCOLOGY | Facility: CLINIC | Age: 65
End: 2020-10-19

## 2020-10-19 DIAGNOSIS — C88.0 WALDENSTROM'S MACROGLOBULINEMIA (HCC): Primary | ICD-10-CM

## 2020-12-24 ENCOUNTER — TELEPHONE (OUTPATIENT)
Dept: HEMATOLOGY ONCOLOGY | Facility: CLINIC | Age: 65
End: 2020-12-24

## 2020-12-30 ENCOUNTER — APPOINTMENT (OUTPATIENT)
Dept: LAB | Facility: CLINIC | Age: 65
End: 2020-12-30
Payer: COMMERCIAL

## 2020-12-30 DIAGNOSIS — C88.0 WALDENSTROM'S MACROGLOBULINEMIA (HCC): ICD-10-CM

## 2020-12-30 LAB
ALBUMIN SERPL BCP-MCNC: 4.3 G/DL (ref 3.5–5)
ALP SERPL-CCNC: 73 U/L (ref 46–116)
ALT SERPL W P-5'-P-CCNC: 29 U/L (ref 12–78)
ANION GAP SERPL CALCULATED.3IONS-SCNC: 4 MMOL/L (ref 4–13)
AST SERPL W P-5'-P-CCNC: 18 U/L (ref 5–45)
BASOPHILS # BLD AUTO: 0.09 THOUSANDS/ΜL (ref 0–0.1)
BASOPHILS NFR BLD AUTO: 1 % (ref 0–1)
BILIRUB SERPL-MCNC: 1.02 MG/DL (ref 0.2–1)
BUN SERPL-MCNC: 10 MG/DL (ref 5–25)
CALCIUM SERPL-MCNC: 9.5 MG/DL (ref 8.3–10.1)
CHLORIDE SERPL-SCNC: 105 MMOL/L (ref 100–108)
CO2 SERPL-SCNC: 29 MMOL/L (ref 21–32)
CREAT SERPL-MCNC: 0.89 MG/DL (ref 0.6–1.3)
EOSINOPHIL # BLD AUTO: 0.18 THOUSAND/ΜL (ref 0–0.61)
EOSINOPHIL NFR BLD AUTO: 3 % (ref 0–6)
ERYTHROCYTE [DISTWIDTH] IN BLOOD BY AUTOMATED COUNT: 12.3 % (ref 11.6–15.1)
GFR SERPL CREATININE-BSD FRML MDRD: 91 ML/MIN/1.73SQ M
GLUCOSE SERPL-MCNC: 86 MG/DL (ref 65–140)
HCT VFR BLD AUTO: 46 % (ref 36.5–49.3)
HGB BLD-MCNC: 15.8 G/DL (ref 12–17)
IGA SERPL-MCNC: 33 MG/DL (ref 70–400)
IGG SERPL-MCNC: 300 MG/DL (ref 700–1600)
IGM SERPL-MCNC: 97 MG/DL (ref 40–230)
IMM GRANULOCYTES # BLD AUTO: 0.05 THOUSAND/UL (ref 0–0.2)
IMM GRANULOCYTES NFR BLD AUTO: 1 % (ref 0–2)
LYMPHOCYTES # BLD AUTO: 1.56 THOUSANDS/ΜL (ref 0.6–4.47)
LYMPHOCYTES NFR BLD AUTO: 25 % (ref 14–44)
MCH RBC QN AUTO: 30.7 PG (ref 26.8–34.3)
MCHC RBC AUTO-ENTMCNC: 34.3 G/DL (ref 31.4–37.4)
MCV RBC AUTO: 90 FL (ref 82–98)
MONOCYTES # BLD AUTO: 0.69 THOUSAND/ΜL (ref 0.17–1.22)
MONOCYTES NFR BLD AUTO: 11 % (ref 4–12)
NEUTROPHILS # BLD AUTO: 3.81 THOUSANDS/ΜL (ref 1.85–7.62)
NEUTS SEG NFR BLD AUTO: 59 % (ref 43–75)
NRBC BLD AUTO-RTO: 0 /100 WBCS
PLATELET # BLD AUTO: 214 THOUSANDS/UL (ref 149–390)
PMV BLD AUTO: 11 FL (ref 8.9–12.7)
POTASSIUM SERPL-SCNC: 4 MMOL/L (ref 3.5–5.3)
PROT SERPL-MCNC: 6.8 G/DL (ref 6.4–8.2)
RBC # BLD AUTO: 5.14 MILLION/UL (ref 3.88–5.62)
SODIUM SERPL-SCNC: 138 MMOL/L (ref 136–145)
WBC # BLD AUTO: 6.38 THOUSAND/UL (ref 4.31–10.16)

## 2020-12-30 PROCEDURE — 36415 COLL VENOUS BLD VENIPUNCTURE: CPT

## 2020-12-30 PROCEDURE — 82784 ASSAY IGA/IGD/IGG/IGM EACH: CPT

## 2020-12-30 PROCEDURE — 85025 COMPLETE CBC W/AUTO DIFF WBC: CPT

## 2020-12-30 PROCEDURE — 80053 COMPREHEN METABOLIC PANEL: CPT

## 2020-12-30 PROCEDURE — 83883 ASSAY NEPHELOMETRY NOT SPEC: CPT

## 2021-01-05 LAB
KAPPA LC FREE SER-MCNC: 10 MG/L (ref 3.3–19.4)
KAPPA LC FREE/LAMBDA FREE SER: 2.86 {RATIO} (ref 0.26–1.65)
LAMBDA LC FREE SERPL-MCNC: 3.5 MG/L (ref 5.7–26.3)

## 2021-01-07 ENCOUNTER — TELEPHONE (OUTPATIENT)
Dept: HEMATOLOGY ONCOLOGY | Facility: CLINIC | Age: 66
End: 2021-01-07

## 2021-01-07 ENCOUNTER — TELEMEDICINE (OUTPATIENT)
Dept: HEMATOLOGY ONCOLOGY | Facility: CLINIC | Age: 66
End: 2021-01-07
Payer: COMMERCIAL

## 2021-01-07 DIAGNOSIS — C88.0 WALDENSTROM'S MACROGLOBULINEMIA (HCC): Primary | ICD-10-CM

## 2021-01-07 PROCEDURE — 99214 OFFICE O/P EST MOD 30 MIN: CPT | Performed by: NURSE PRACTITIONER

## 2021-01-07 NOTE — TELEPHONE ENCOUNTER
Called patient to schedule his follow up appointment with [facility]  He knows he has orders for lab work as well

## 2021-01-07 NOTE — PROGRESS NOTES
Virtual Regular Visit      Assessment/Plan:    Problem List Items Addressed This Visit        Other    Waldenstrom's macroglobulinemia [facility]) - Primary       Patient is a 61-year-old male with a history of Waldenstrom's currently on Imbruvica 280 mg p o  daily  He tolerates this without difficulty  Reviewed his recent lab studies including IgG IgA IgM levels which  are stable, and immunoglobulin kappa lambda ratio improved from 3 57 in September 2020 to 2 86 on 12/30/2020  His CBC and CMP are within limits  Overall he is able to function without difficulty  We discussed whether or not he should receive the COVID vaccine when it is available for him  I recommended a that he would be able to receive the vaccine as well his blood counts are in appropriate level  I encouraged him to contact our office so we can coordinate his vaccine and his lab work  We will continue seeing patient months with labs patient understanding and is agreement with plan  · CBC with differential  · CMP  · Immunoglobulin light chains  · IgG IgA IgM         Reason for visit is No chief complaint on file  Encounter provider LAMONT Simon    Provider located at [address] [address] [phone]      Recent Visits  No visits were found meeting these conditions  Showing recent visits within past 7 days and meeting all other requirements     Future Appointments  No visits were found meeting these conditions  Showing future appointments within next 150 days and meeting all other requirements        The patient was identified by name and date of birth  Jeanie Whelan was informed that this is a telemedicine visit and that the visit is being conducted through [employer] and patient was informed that this is a secure, HIPAA-compliant platform  He agrees to proceed     My office door was closed  No one else was in the room    He acknowledged consent and understanding of privacy and security of the video platform  The patient has agreed to participate and understands they can discontinue the visit at any time  Patient is aware this is a billable service  Subjective  Frances Varma is a 63 y o  male with a history of Waldenstrom's macroglobulinemia, clinically stable  HPI     Past Medical History:   Diagnosis Date    Anemia, unspecified 4/30/2018    Hyperglycemia     Last Assessed:1/18/18    Waldenstrom's macroglobulinemia ([address] ) 6/21/2018       Past Surgical History:   Procedure Laterality Date    WISDOM TOOTH EXTRACTION      Last Assessed:1/18/18       Current Outpatient Medications   Medication Sig Dispense Refill    Acetaminophen (TYLENOL) 325 MG CAPS Take by mouth as needed       Ibrutinib 140 MG CAPS Take 1 capsule (140 mg total) by mouth daily Take 2 caps by mouth each day  60 capsule 3    Multiple Vitamin (MULTI VITAMIN MENS PO) Take 1 tablet by mouth daily       No current facility-administered medications for this visit  No Known Allergies    Review of Systems   Constitutional: Negative for activity change, appetite change, fatigue, fever and unexpected weight change  Respiratory: Negative for cough and shortness of breath  Cardiovascular: Negative for chest pain and leg swelling  Gastrointestinal: Negative for abdominal pain, constipation, diarrhea and nausea  Endocrine: Negative for cold intolerance and heat intolerance  Musculoskeletal: Negative for arthralgias and myalgias  Skin: Negative  Neurological: Negative for dizziness, weakness and headaches  Hematological: Negative for adenopathy  Does not bruise/bleed easily  Video Exam    There were no vitals filed for this visit  Physical Exam  Constitutional:       Appearance: He is well-developed  HENT:      Head: Normocephalic and atraumatic  Eyes:      Extraocular Movements: Extraocular movements intact     Neck: Musculoskeletal: Normal range of motion  Pulmonary:      Effort: Pulmonary effort is normal  No respiratory distress  Musculoskeletal: Normal range of motion  Lymphadenopathy:      Cervical: No cervical adenopathy  Neurological:      Mental Status: He is alert and oriented to person, place, and time  Psychiatric:         Behavior: Behavior normal           I spent Twenty-five minutes directly with the patient during this visit      VIRTUAL VISIT DISCLAIMER    Osorio Frankel acknowledges that he has consented to an online visit or consultation  He understands that the online visit is based solely on information provided by him, and that, in the absence of a face-to-face physical evaluation by the physician, the diagnosis he receives is both limited and provisional in terms of accuracy and completeness  This is not intended to replace a full medical face-to-face evaluation by the physician  Osorio Frankel understands and accepts these terms

## 2021-01-13 ENCOUNTER — DOCUMENTATION (OUTPATIENT)
Dept: HEMATOLOGY ONCOLOGY | Facility: CLINIC | Age: 66
End: 2021-01-13

## 2021-01-13 NOTE — PROGRESS NOTES
1/13/2021  Received notification from diplomat the [address] [place] [address] Avenue needs to be re-authorized  Pt leonie ACE [facility]  ID # [id number]  BIN # [id number]  PCN # [id number]  GRP # [id number]    Completed the auth request form and submitted for auth via fax    1/14/2021  Received approval letter for the imbruvica    Per the approval letter, Karl Zacarias # [id number] is valid from 1/21/2021 through 8/5/2021    Clinical and diplomat were notified

## 2021-01-14 DIAGNOSIS — C88.0 WALDENSTROM'S MACROGLOBULINEMIA (HCC): Primary | ICD-10-CM

## 2021-01-22 ENCOUNTER — TELEPHONE (OUTPATIENT)
Dept: HEMATOLOGY ONCOLOGY | Facility: CLINIC | Age: 66
End: 2021-01-22

## 2021-01-22 NOTE — TELEPHONE ENCOUNTER
Received a call from [facility] Danyell Perez  Confirmed Imbruvica instructions are take 2 180mg caps by mouth daily

## 2021-03-17 DIAGNOSIS — R09.82 POST-NASAL DRIP: ICD-10-CM

## 2021-03-17 DIAGNOSIS — R09.82 POST-NASAL DRIP: Primary | ICD-10-CM

## 2021-03-17 LAB — SARS-COV-2 RNA RESP QL NAA+PROBE: POSITIVE

## 2021-03-17 PROCEDURE — U0003 INFECTIOUS AGENT DETECTION BY NUCLEIC ACID (DNA OR RNA); SEVERE ACUTE RESPIRATORY SYNDROME CORONAVIRUS 2 (SARS-COV-2) (CORONAVIRUS DISEASE [COVID-19]), AMPLIFIED PROBE TECHNIQUE, MAKING USE OF HIGH THROUGHPUT TECHNOLOGIES AS DESCRIBED BY CMS-2020-01-R: HCPCS | Performed by: INTERNAL MEDICINE

## 2021-03-17 PROCEDURE — U0005 INFEC AGEN DETEC AMPLI PROBE: HCPCS | Performed by: INTERNAL MEDICINE

## 2021-03-18 ENCOUNTER — TELEMEDICINE (OUTPATIENT)
Dept: INTERNAL MEDICINE CLINIC | Facility: CLINIC | Age: 66
End: 2021-03-18
Payer: COMMERCIAL

## 2021-03-18 ENCOUNTER — TELEPHONE (OUTPATIENT)
Dept: HEMATOLOGY ONCOLOGY | Facility: CLINIC | Age: 66
End: 2021-03-18

## 2021-03-18 ENCOUNTER — TELEPHONE (OUTPATIENT)
Dept: INTERNAL MEDICINE CLINIC | Facility: CLINIC | Age: 66
End: 2021-03-18

## 2021-03-18 VITALS
HEIGHT: 67 IN | BODY MASS INDEX: 23.39 KG/M2 | SYSTOLIC BLOOD PRESSURE: 123 MMHG | DIASTOLIC BLOOD PRESSURE: 67 MMHG | TEMPERATURE: 96.9 F | HEART RATE: 83 BPM | WEIGHT: 149 LBS

## 2021-03-18 DIAGNOSIS — U07.1 RESPIRATORY TRACT INFECTION DUE TO COVID-19 VIRUS: ICD-10-CM

## 2021-03-18 DIAGNOSIS — J98.8 RESPIRATORY TRACT INFECTION DUE TO COVID-19 VIRUS: ICD-10-CM

## 2021-03-18 DIAGNOSIS — Z12.11 ENCOUNTER FOR SCREENING FOR MALIGNANT NEOPLASM OF COLON: Primary | ICD-10-CM

## 2021-03-18 DIAGNOSIS — D72.820 MONOCLONAL B-CELL LYMPHOCYTOSIS: ICD-10-CM

## 2021-03-18 DIAGNOSIS — C88.0 WALDENSTROM'S MACROGLOBULINEMIA (HCC): ICD-10-CM

## 2021-03-18 DIAGNOSIS — Z11.59 NEED FOR HEPATITIS C SCREENING TEST: ICD-10-CM

## 2021-03-18 PROCEDURE — 99214 OFFICE O/P EST MOD 30 MIN: CPT | Performed by: INTERNAL MEDICINE

## 2021-03-18 PROCEDURE — 3725F SCREEN DEPRESSION PERFORMED: CPT | Performed by: INTERNAL MEDICINE

## 2021-03-18 RX ORDER — ACETAMINOPHEN 325 MG/1
650 TABLET ORAL ONCE AS NEEDED
Status: CANCELLED | OUTPATIENT
Start: 2021-03-20

## 2021-03-18 RX ORDER — ALBUTEROL SULFATE 90 UG/1
3 AEROSOL, METERED RESPIRATORY (INHALATION) ONCE AS NEEDED
Status: CANCELLED | OUTPATIENT
Start: 2021-03-20

## 2021-03-18 RX ORDER — SODIUM CHLORIDE 9 MG/ML
20 INJECTION, SOLUTION INTRAVENOUS ONCE
Status: CANCELLED | OUTPATIENT
Start: 2021-03-20

## 2021-03-18 NOTE — PROGRESS NOTES
Virtual Regular Visit      Assessment/Plan:    Problem List Items Addressed This Visit        Respiratory    Respiratory tract infection due to COVID-19 virus      Patient will be scheduled for monoclonal antibody infusion  Other    Monoclonal B-cell lymphocytosis       Clinically stable  With recent diagnosis of COVID positive URI patient will be scheduled for monoclonal antibody infusion         Waldenstrom's macroglobulinemia ([facility] [address] )      Patient reschedule for monoclonal antibody infusion as noted above  Other Visit Diagnoses     Encounter for screening for malignant neoplasm of colon    -  Primary    Relevant Orders    Cologuard    Need for hepatitis C screening test        Relevant Orders    Hepatitis C antibody               Reason for visit is   Chief Complaint   Patient presents with    COVID-19     Patient is COVID positive  He has an occasional cough  Encounter provider Duane Baba, MD    Provider located at [address] [address]      Recent Visits  No visits were found meeting these conditions  Showing recent visits within past 7 days and meeting all other requirements     Today's Visits  Date Type Provider Dept   03/18/21 Telemedicine Duane Baba,  [facility] today's visits and meeting all other requirements     Future Appointments  No visits were found meeting these conditions  Showing future appointments within next 150 days and meeting all other requirements        The patient was identified by name and date of birth  Osorio Frankel was informed that this is a telemedicine visit and that the visit is being conducted through [employer] and patient was informed that this is a secure, HIPAA-compliant platform  He agrees to proceed     My office door was closed   The patient was notified the following individuals were present in the room : Bonnie Willoughby, medical student     He acknowledged consent and understanding of privacy and security of the video platform  The patient has agreed to participate and understands they can discontinue the visit at any time  Patient is aware this is a billable service  Subjective  Violet Ramos is a 63 y o  male  Was recently diagnosed COVID positive  Patient is relieved recently diagnosed COVID positive following a diagnosis of COVID positive in his mother who began having upper respiratory symptoms this past Sunday  They deny any contact with COVID positive individuals  They have been on voluntary quarantine for the most part at home rarely venturing out  They had a podiatrist come into the home approximately 1 week ago to perform Podiatry Services on the patient's mother, but other than that have had no visitors  When the patient goes out shopping he is always mask to and follows social distancing guidelines and performs handwashing upon return home  He began having symptoms on Tuesday following his mother's onset on Sunday and was diagnosed yesterday as being COVID positive  He is having no symptoms of any shortness of breath  Symptoms are primarily upper respiratory in nature and primarily consist of some sinus congestion and rhinorrhea       Past Medical History:   Diagnosis Date    Anemia, unspecified 4/30/2018    Hyperglycemia     Last Assessed:1/18/18    Respiratory tract infection due to COVID-19 virus 3/18/2021    [facility]'s [facility] 6/21/2018       Past Surgical History:   Procedure Laterality Date    WISDOM TOOTH EXTRACTION      Last Assessed:1/18/18       Current Outpatient Medications   Medication Sig Dispense Refill    Acetaminophen (TYLENOL) 325 MG CAPS Take by mouth as needed       Ibrutinib 140 MG CAPS Take 1 capsule (140 mg total) by mouth daily Take 2 caps by mouth each day   60 capsule 3    Multiple Vitamin (MULTI VITAMIN MENS PO) Take 1 tablet by mouth daily       No current facility-administered medications for this visit  No Known Allergies    Review of Systems   Constitutional: Negative  HENT: Positive for congestion (Mild), rhinorrhea and sinus pressure  Negative for sore throat and trouble swallowing  No loss of taste or smell   Eyes: Negative  Respiratory: Negative for cough, chest tightness and shortness of breath  Cardiovascular: Negative  Gastrointestinal: Negative  Endocrine: Negative  Genitourinary: Negative  Musculoskeletal: Positive for myalgias (Mild myalgias)  Video Exam    Vitals:    03/18/21 1009   BP: 123/67   BP Location: Left arm   Patient Position: Sitting   Pulse: 83   Temp: (!) 96 9 °F (36 1 °C)   TempSrc: Oral   Weight: 67 6 kg (149 lb)   Height: 5' 7" (1 702 m)       Physical Exam  Vitals signs reviewed  Constitutional:       General: He is not in acute distress  Appearance: Normal appearance  He is normal weight  He is not ill-appearing, toxic-appearing or diaphoretic  HENT:      Head: Normocephalic and atraumatic  Right Ear: External ear normal       Left Ear: External ear normal    Eyes:      General: No scleral icterus  Conjunctiva/sclera: Conjunctivae normal       Pupils: Pupils are equal, round, and reactive to light  Neck:      Musculoskeletal: Neck supple  Vascular: No JVD  Trachea: No tracheal deviation  Cardiovascular:      Rate and Rhythm: Normal rate  Pulmonary:      Effort: Pulmonary effort is normal  No respiratory distress  Abdominal:      General: There is no distension  Musculoskeletal:         General: No swelling, tenderness or deformity  Right lower leg: No edema  Left lower leg: No edema  Skin:     Coloration: Skin is not jaundiced  Findings: No bruising, erythema or rash  Neurological:      General: No focal deficit present        Mental Status: He is alert and oriented to person, place, and time  Mental status is at baseline  Psychiatric:         Mood and Affect: Mood normal          Behavior: Behavior normal      I spent 15 minutes directly with the patient during this visit      Casirivimab and Hiral Alberto are investigational medicines used to treat mild to moderate symptoms of COVID-19 in non-hospitalized adults and adolescents (15years of age and older who weigh at least 80 pounds (40 kg)), and who are at high risk for developing severe COVID-19 symptoms or the need for hospitalization  Casirivimab and imdevimab are investigational because they are still being studied  There is limited information known about the safety and effectiveness of using casirivimab and imdevimab to treat people with COVID-19  The FDA has authorized the emergency use of casirivimab and imdevimab for the treatment of COVID-19 under an Emergency Use Authorization (EUA)  Possible side effects of casirivimab and imdevimab: Allergic reactions can happen during and after infusion with casirivimab and imdevimab  Possible reactions include: fever, chills, nausea, headache, shortness of breath, low blood pressure, wheezing, swelling of your lips, face, or throat, rash including hives, itching, muscle aches, and dizziness  The side effects of getting any medicine by vein may include brief pain, bleeding, bruising of the skin, soreness, swelling, and possible infection at the infusion site  These are not all the possible side effects of casirivimab and imdevimab  Not a lot of people have been given casirivimab and imdevimab  Serious and unexpected side effects may happen  Casirivimab and imdevimab are still being studied so it is possible that all of the risks are not known at this time  It is possible that casirivimab and imdevimab could interfere with your body's own ability to fight off a future infection of SARS-CoV-2   Similarly, casirivimab and imdevimab may reduce your body's immune response to a vaccine for SARS-CoV-2  Specific studies have not been conducted to address these possible risks  Emergency Use Authorization:    The [facility] FDA has made casirivimab and imdevimab available under an emergency access mechanism called an EUA  The EUA is supported by a [place] of Health and Human Service ([facility]) declaration that circumstances exist to justify the emergency use of drugs and biological products during the COVID-19 pandemic  Casirivimab and imdevimab have not undergone the same type of review as an FDA-approved or cleared product  The FDA may issue an EUA when certain criteria are met, which includes that there are no adequate, approved, available alternatives  In addition, the FDA decision is based on the totality of scientific evidence available showing that it is reasonable to believe that the product meets certain criteria for safety, performance, and labeling and may be effective in treatment of patients during the COVID-19 pandemic  All of these criteria must be met to allow for the product to be used in the treatment of patients during the COVID-19 pandemic  The EUA for casirivimab and imdevimab is in effect for the duration of the COVID-19 declaration justifying emergency use of these products, unless terminated or revoked (after which the products may no longer be used)  Regarding COVID-19 Vaccination:    Currently there is no data or safety or efficacy of COVID-19 vaccination in persons who received monoclonal antibodies as part of COVID-19 treatment  Treatment should be deferred for at least 90 days to avoid interference of the treatment with vaccine-induced immune responses (this is based on estimated half-life of therapies and evidence suggesting reinfection is uncommon within 90 days of initial infection)  The patient consents to proceed with casirivimab and imdevimab infusion      VIRTUAL VISIT DISCLAIMER    Saurabh Mclaughlin acknowledges that he has consented to an online visit or consultation  He understands that the online visit is based solely on information provided by him, and that, in the absence of a face-to-face physical evaluation by the physician, the diagnosis he receives is both limited and provisional in terms of accuracy and completeness  This is not intended to replace a full medical face-to-face evaluation by the physician  Jeanie Whelan understands and accepts these terms

## 2021-03-18 NOTE — TELEPHONE ENCOUNTER
Patient calling to report that he tested positive for COVID on 3/17/21  His PCP would like to order antibody therapy    Patient wanted to check with Dr Al Raymond that this will be ok based in his diagnosis and treatment     Will send to RN to review with MD

## 2021-03-18 NOTE — ASSESSMENT & PLAN NOTE
Clinically stable    With recent diagnosis of COVID positive URI patient will be scheduled for monoclonal antibody infusion

## 2021-03-19 NOTE — PROGRESS NOTES
Tiger text sent to Dr Ruthie Cm at this time to make aware that a note is needed in the chart stating that the EUA and drug was reviewed for us to be able to treat the pt

## 2021-03-20 ENCOUNTER — HOSPITAL ENCOUNTER (OUTPATIENT)
Dept: INFUSION CENTER | Facility: HOSPITAL | Age: 66
Discharge: HOME | End: 2021-03-20
Payer: COMMERCIAL

## 2021-03-20 VITALS
TEMPERATURE: 97.1 F | HEART RATE: 74 BPM | OXYGEN SATURATION: 97 % | RESPIRATION RATE: 18 BRPM | SYSTOLIC BLOOD PRESSURE: 105 MMHG | DIASTOLIC BLOOD PRESSURE: 67 MMHG

## 2021-03-20 DIAGNOSIS — U07.1 RESPIRATORY TRACT INFECTION DUE TO COVID-19 VIRUS: ICD-10-CM

## 2021-03-20 DIAGNOSIS — D72.820 MONOCLONAL B-CELL LYMPHOCYTOSIS: ICD-10-CM

## 2021-03-20 DIAGNOSIS — C88.0 WALDENSTROM'S MACROGLOBULINEMIA (HCC): Primary | ICD-10-CM

## 2021-03-20 DIAGNOSIS — J98.8 RESPIRATORY TRACT INFECTION DUE TO COVID-19 VIRUS: ICD-10-CM

## 2021-03-20 PROCEDURE — M0243 CASIRIVI AND IMDEVI INFUSION: HCPCS | Performed by: INTERNAL MEDICINE

## 2021-03-20 RX ORDER — ALBUTEROL SULFATE 90 UG/1
3 AEROSOL, METERED RESPIRATORY (INHALATION) ONCE AS NEEDED
Status: CANCELLED | OUTPATIENT
Start: 2021-03-20

## 2021-03-20 RX ORDER — ACETAMINOPHEN 325 MG/1
650 TABLET ORAL ONCE AS NEEDED
Status: CANCELLED | OUTPATIENT
Start: 2021-03-20

## 2021-03-20 RX ORDER — SODIUM CHLORIDE 9 MG/ML
20 INJECTION, SOLUTION INTRAVENOUS ONCE
Status: CANCELLED | OUTPATIENT
Start: 2021-03-20

## 2021-03-20 RX ORDER — SODIUM CHLORIDE 9 MG/ML
20 INJECTION, SOLUTION INTRAVENOUS ONCE
Status: COMPLETED | OUTPATIENT
Start: 2021-03-20 | End: 2021-03-20

## 2021-03-20 RX ORDER — ALBUTEROL SULFATE 90 UG/1
3 AEROSOL, METERED RESPIRATORY (INHALATION) ONCE AS NEEDED
Status: DISCONTINUED | OUTPATIENT
Start: 2021-03-20 | End: 2021-03-23 | Stop reason: HOSPADM

## 2021-03-20 RX ORDER — ACETAMINOPHEN 325 MG/1
650 TABLET ORAL ONCE AS NEEDED
Status: DISCONTINUED | OUTPATIENT
Start: 2021-03-20 | End: 2021-03-23 | Stop reason: HOSPADM

## 2021-03-20 RX ADMIN — SODIUM CHLORIDE 20 ML/HR: 0.9 INJECTION, SOLUTION INTRAVENOUS at 08:29

## 2021-03-20 RX ADMIN — IMDEVIMAB: 300 INJECTION, SOLUTION, CONCENTRATE INTRAVENOUS at 08:27

## 2021-03-20 NOTE — PLAN OF CARE
Problem: Potential for Falls  Goal: Patient will remain free of falls  Description: INTERVENTIONS:  - Assess patient frequently for physical needs  -  Identify cognitive and physical deficits and behaviors that affect risk of falls  -  [place] fall precautions as indicated by assessment   - Educate patient/family on patient safety including physical limitations  - Instruct patient to call for assistance with activity based on assessment  - Modify environment to reduce risk of injury  - Consider OT/PT consult to assist with strengthening/mobility  Outcome: Progressing     Problem: Knowledge Deficit  Goal: Patient/family/caregiver demonstrates understanding of disease process, treatment plan, medications, and discharge instructions  Description: Complete learning assessment and assess knowledge base    Interventions:  - Provide teaching at level of understanding  - Provide teaching via preferred learning methods  Outcome: Progressing

## 2021-03-20 NOTE — PROGRESS NOTES
Patient tolerated Regeneron (Carsirivimab and Imdevimab) infusion and 1 hour post observation without incident  IV discontinued, catheter intact  Gauze applied to site  Discharge instructions reviewed with patient verbally  Copy of discharge instructions given to patient  Patient verbalized understanding of all discharge instructions  Patient discharged without incident  Patient will call PCP on Monday for follow up

## 2021-03-23 ENCOUNTER — TELEMEDICINE (OUTPATIENT)
Dept: INTERNAL MEDICINE CLINIC | Facility: CLINIC | Age: 66
End: 2021-03-23
Payer: COMMERCIAL

## 2021-03-23 VITALS — TEMPERATURE: 98.2 F | HEIGHT: 67 IN | BODY MASS INDEX: 23.39 KG/M2 | WEIGHT: 149 LBS

## 2021-03-23 DIAGNOSIS — U07.1 COVID-19: Primary | ICD-10-CM

## 2021-03-23 PROCEDURE — 99213 OFFICE O/P EST LOW 20 MIN: CPT | Performed by: NURSE PRACTITIONER

## 2021-03-23 PROCEDURE — 3008F BODY MASS INDEX DOCD: CPT | Performed by: NURSE PRACTITIONER

## 2021-03-23 NOTE — PROGRESS NOTES
COVID-19 Virtual Visit     Assessment/Plan:    Problem List Items Addressed This Visit     None      Visit Diagnoses     COVID-19    -  Primary         Disposition:     I recommended continued isolation until at least 24 hours have passed since recovery defined as resolution of fever without the use of fever-reducing medications AND improvement in COVID symptoms AND 10 days have passed since onset of symptoms (or 10 days have passed since date of first positive viral diagnostic test for asymptomatic patients)  Patient is off of quarantine tomorrow  He is significantly better  Monitor for new or worsening symptoms  I have spent 11 minutes directly with the patient  Greater than 50% of this time was spent in counseling/coordination of care regarding: diagnostic results, prognosis, risks and benefits of treatment options, instructions for management, patient and family education, importance of treatment compliance and risk factor reductions  Encounter provider LAMONT Cervantes    Provider located at [address] [place] [id number]    Recent Visits  Date Type Provider Dept   03/18/21 Telephone Genaro Valladares  [facility]   Showing recent visits within past 7 days and meeting all other requirements     Today's Visits  Date Type Provider Dept   03/23/21 Telemedicine Griselda Flight [place]ting, CRNP [facility]   Showing today's visits and meeting all other requirements     Future Appointments  No visits were found meeting these conditions  Showing future appointments within next 150 days and meeting all other requirements      This virtual check-in was done via [employer] and patient was informed that this is a secure, HIPAA-compliant platform  He agrees to proceed      Patient agrees to participate in a virtual check in via telephone or video visit instead of presenting to the office to address urgent/immediate medical needs  Patient is aware this is a billable service  After connecting through [facility], the patient was identified by name and date of birth  Erlin Licona was informed that this was a telemedicine visit and that the exam was being conducted confidentially over secure lines  My office door was closed  No one else was in the room  Erlin Licona acknowledged consent and understanding of privacy and security of the telemedicine visit  I informed the patient that I have reviewed his record in Epic and presented the opportunity for him to ask any questions regarding the visit today  The patient agreed to participate  Subjective:   Erlin Licona is a 63 y o  male who has been screened for COVID-19  Symptom change since last report: resolving  Patient's symptoms include chills  Patient denies fever, fatigue, congestion, rhinorrhea, sore throat, anosmia, loss of taste, cough, shortness of breath, chest tightness, abdominal pain, nausea, vomiting, diarrhea, myalgias and headaches  Carmell Leventhal has been staying home and has isolated themselves in his home  He is taking care to not share personal items and is cleaning all surfaces that are touched often, like counters, tabletops, and doorknobs using household cleaning sprays or wipes  He is wearing a mask when he leaves his room       Date of symptom onset: 3/14/2021  Date of positive COVID-19 PCR: 3/17/2021    Monoclonal Antibody Follow-up Symptom Questionnaire  I feel overall: much better  My breathing is: much better  My fever is: better  My fatigue is: much better    Lab Results   Component Value Date    SARSCOV2 Positive (A) 03/17/2021     Past Medical History:   Diagnosis Date    Anemia, unspecified 4/30/2018    COVID-19 03/17/2021    Hyperglycemia     Last Assessed:1/18/18    Respiratory tract infection due to COVID-19 virus 3/18/2021    Waldenstrom's macroglobulinemia ([facility] [address] ) 6/21/2018     Past Surgical History:   Procedure Laterality Date    WISDOM TOOTH EXTRACTION      Last Assessed:1/18/18     Current Outpatient Medications   Medication Sig Dispense Refill    Acetaminophen (TYLENOL) 325 MG CAPS Take by mouth as needed       Ibrutinib 140 MG CAPS Take 1 capsule (140 mg total) by mouth daily Take 2 caps by mouth each day  60 capsule 3    Multiple Vitamin (MULTI VITAMIN MENS PO) Take 1 tablet by mouth daily       No current facility-administered medications for this visit  No Known Allergies    Review of Systems   Constitutional: Positive for chills  Negative for fatigue and fever  HENT: Negative for congestion, rhinorrhea and sore throat  Respiratory: Negative for cough, chest tightness and shortness of breath  Cardiovascular: Negative for chest pain and palpitations  Gastrointestinal: Negative for abdominal pain, diarrhea, nausea and vomiting  Genitourinary: Negative for difficulty urinating  Musculoskeletal: Negative for myalgias  Neurological: Negative for dizziness and headaches  Objective:    Vitals:    03/23/21 1344   Temp: 98 2 °F (36 8 °C)   TempSrc: Oral   Weight: 67 6 kg (149 lb)   Height: 5' 7" (1 702 m)       Physical Exam  Constitutional:       General: He is not in acute distress  Appearance: Normal appearance  He is not ill-appearing  HENT:      Head: Normocephalic and atraumatic  Eyes:      General: No scleral icterus  Neck:      Musculoskeletal: Normal range of motion  Pulmonary:      Effort: Pulmonary effort is normal  No respiratory distress  Abdominal:      Tenderness: There is no abdominal tenderness  Skin:     Findings: No erythema  Neurological:      Mental Status: He is alert and oriented to person, place, and time     Psychiatric:         Mood and Affect: Mood normal          Behavior: Behavior normal        VIRTUAL VISIT DISCLAIMER    Henrique Gotti acknowledges that he has consented to an online visit or consultation  He understands that the online visit is based solely on information provided by him, and that, in the absence of a face-to-face physical evaluation by the physician, the diagnosis he receives is both limited and provisional in terms of accuracy and completeness  This is not intended to replace a full medical face-to-face evaluation by the physician  Alan Anand understands and accepts these terms

## 2021-04-01 ENCOUNTER — APPOINTMENT (OUTPATIENT)
Dept: LAB | Facility: CLINIC | Age: 66
End: 2021-04-01
Payer: COMMERCIAL

## 2021-04-01 DIAGNOSIS — Z11.59 NEED FOR HEPATITIS C SCREENING TEST: ICD-10-CM

## 2021-04-01 DIAGNOSIS — C88.0 WALDENSTROM'S MACROGLOBULINEMIA (HCC): ICD-10-CM

## 2021-04-01 LAB
ALBUMIN SERPL BCP-MCNC: 4 G/DL (ref 3.5–5)
ALP SERPL-CCNC: 88 U/L (ref 46–116)
ALT SERPL W P-5'-P-CCNC: 38 U/L (ref 12–78)
ANION GAP SERPL CALCULATED.3IONS-SCNC: 7 MMOL/L (ref 4–13)
AST SERPL W P-5'-P-CCNC: 18 U/L (ref 5–45)
BASOPHILS # BLD AUTO: 0.08 THOUSANDS/ΜL (ref 0–0.1)
BASOPHILS NFR BLD AUTO: 1 % (ref 0–1)
BILIRUB SERPL-MCNC: 0.74 MG/DL (ref 0.2–1)
BUN SERPL-MCNC: 11 MG/DL (ref 5–25)
CALCIUM SERPL-MCNC: 9.4 MG/DL (ref 8.3–10.1)
CHLORIDE SERPL-SCNC: 105 MMOL/L (ref 100–108)
CO2 SERPL-SCNC: 26 MMOL/L (ref 21–32)
CREAT SERPL-MCNC: 0.95 MG/DL (ref 0.6–1.3)
EOSINOPHIL # BLD AUTO: 0.03 THOUSAND/ΜL (ref 0–0.61)
EOSINOPHIL NFR BLD AUTO: 0 % (ref 0–6)
ERYTHROCYTE [DISTWIDTH] IN BLOOD BY AUTOMATED COUNT: 12.2 % (ref 11.6–15.1)
GFR SERPL CREATININE-BSD FRML MDRD: 85 ML/MIN/1.73SQ M
GLUCOSE SERPL-MCNC: 90 MG/DL (ref 65–140)
HCT VFR BLD AUTO: 43.9 % (ref 36.5–49.3)
HCV AB SER QL: NORMAL
HGB BLD-MCNC: 15.2 G/DL (ref 12–17)
IGA SERPL-MCNC: 39 MG/DL (ref 70–400)
IGG SERPL-MCNC: 329 MG/DL (ref 700–1600)
IGM SERPL-MCNC: 116 MG/DL (ref 40–230)
IMM GRANULOCYTES # BLD AUTO: 0.07 THOUSAND/UL (ref 0–0.2)
IMM GRANULOCYTES NFR BLD AUTO: 1 % (ref 0–2)
LYMPHOCYTES # BLD AUTO: 2.19 THOUSANDS/ΜL (ref 0.6–4.47)
LYMPHOCYTES NFR BLD AUTO: 26 % (ref 14–44)
MCH RBC QN AUTO: 31 PG (ref 26.8–34.3)
MCHC RBC AUTO-ENTMCNC: 34.6 G/DL (ref 31.4–37.4)
MCV RBC AUTO: 90 FL (ref 82–98)
MONOCYTES # BLD AUTO: 0.88 THOUSAND/ΜL (ref 0.17–1.22)
MONOCYTES NFR BLD AUTO: 10 % (ref 4–12)
NEUTROPHILS # BLD AUTO: 5.22 THOUSANDS/ΜL (ref 1.85–7.62)
NEUTS SEG NFR BLD AUTO: 62 % (ref 43–75)
NRBC BLD AUTO-RTO: 0 /100 WBCS
PLATELET # BLD AUTO: 293 THOUSANDS/UL (ref 149–390)
PMV BLD AUTO: 11 FL (ref 8.9–12.7)
POTASSIUM SERPL-SCNC: 3.9 MMOL/L (ref 3.5–5.3)
PROT SERPL-MCNC: 6.8 G/DL (ref 6.4–8.2)
RBC # BLD AUTO: 4.9 MILLION/UL (ref 3.88–5.62)
SODIUM SERPL-SCNC: 138 MMOL/L (ref 136–145)
WBC # BLD AUTO: 8.47 THOUSAND/UL (ref 4.31–10.16)

## 2021-04-01 PROCEDURE — 36415 COLL VENOUS BLD VENIPUNCTURE: CPT

## 2021-04-01 PROCEDURE — 86803 HEPATITIS C AB TEST: CPT

## 2021-04-01 PROCEDURE — 85025 COMPLETE CBC W/AUTO DIFF WBC: CPT

## 2021-04-01 PROCEDURE — 82784 ASSAY IGA/IGD/IGG/IGM EACH: CPT

## 2021-04-01 PROCEDURE — 80053 COMPREHEN METABOLIC PANEL: CPT

## 2021-04-01 PROCEDURE — 83883 ASSAY NEPHELOMETRY NOT SPEC: CPT

## 2021-04-02 ENCOUNTER — TELEPHONE (OUTPATIENT)
Dept: HEMATOLOGY ONCOLOGY | Facility: CLINIC | Age: 66
End: 2021-04-02

## 2021-04-02 LAB
KAPPA LC FREE SER-MCNC: 10.4 MG/L (ref 3.3–19.4)
KAPPA LC FREE/LAMBDA FREE SER: 1.86 {RATIO} (ref 0.26–1.65)
LAMBDA LC FREE SERPL-MCNC: 5.6 MG/L (ref 5.7–26.3)

## 2021-04-02 NOTE — TELEPHONE ENCOUNTER
Inbound Calls to Reschedule/Cancel Appointments   Patient Details:     Moe Barroso     1955     [id number]           Who is calling? Patient    Date of original appointment 04/08 at 1:30pm   Visit Type Follow Up   Who is the Provider and Location? [facility]    Is the patient on active treatment? Chemo? Radiation? no   The patient would like to cancel, reschedule or make into a virtual appointment? Virtual Visit   Reason for rescheduling or cancelation? COVID    Platform Using [employer]   Next Steps:           HopeLine: After completing the above information, please route to Chauncey Vasquez and [id number] [employer] Team: Please review and reach out to the patient

## 2021-04-02 NOTE — TELEPHONE ENCOUNTER
I called the patient and left a message offering Doximity for the virtual visit for 4/08  I then stated to call the office back to inform us if Doximity is a good option for the virtual visit

## 2021-04-08 ENCOUNTER — TELEMEDICINE (OUTPATIENT)
Dept: HEMATOLOGY ONCOLOGY | Facility: CLINIC | Age: 66
End: 2021-04-08
Payer: COMMERCIAL

## 2021-04-08 ENCOUNTER — TELEPHONE (OUTPATIENT)
Dept: HEMATOLOGY ONCOLOGY | Facility: CLINIC | Age: 66
End: 2021-04-08

## 2021-04-08 DIAGNOSIS — C88.0 WALDENSTROM'S MACROGLOBULINEMIA (HCC): Primary | ICD-10-CM

## 2021-04-08 PROCEDURE — 99213 OFFICE O/P EST LOW 20 MIN: CPT | Performed by: NURSE PRACTITIONER

## 2021-04-08 NOTE — TELEPHONE ENCOUNTER
Left message for Paula Macias and went over his 3 month follow up appointment information  I mailed out the AVS and labs  I asked that he call back if the date or time do not work  [facility] the [id number] [facility]

## 2021-04-08 NOTE — PROGRESS NOTES
Virtual Regular Visit      Assessment/Plan:    Problem List Items Addressed This Visit        Other    Waldenstrom's macroglobulinemia ([facility] [address] ) - Primary    Relevant Orders    CBC and differential    Comprehensive metabolic panel    Immunoglobulin free LT chains blood    IgG, IgA, IgM       Patient is a 30-year-old male with a history of Waldenstrom's macroglobulinemia currently on Imbruvica 280 mg p o  Daily  He is tolerating this without difficulty  We reviewed his most recent blood work from 04/01/2021 including CBC and CMP which are both completely stable  His kappa lambda ratio has improved from 2 86 in January to 1 86 in April, quantitative immunoglobulins have also improved  Currently patient is recovering from the COVID infection which he had diagnosed on March 18, 2021  He states his symptoms included fever chills rhinitis and weakness  His 41-year-old mother was also tested positive for COVID with similar symptoms  He was treated with monoclonal antibodies on 03/20/21 and states after the treatment he did have increased fever sweats and a cough  More recently he is doing well and denies any further symptoms  He was able to take his vital signs today at home his temperature was 97 1° and blood pressure 126/73 and states his weight was 162 lb  We will continue seeing him every 3 months with blood work, patient verbalized understanding and is in agreement with the plan  Reason for visit is No chief complaint on file         Encounter provider LAMONT Laughlin    Provider located at [address] [address] [phone]      Recent Visits  Date Type Provider Dept   04/02/21 Telephone [facility] [address] [facility] recent visits within past 7 days and meeting all other requirements     Today's Visits  Date Type Provider Dept   04/08/21 Raoul [address] LAMONT [facility]   Showing today's visits and meeting all other requirements     Future Appointments  No visits were found meeting these conditions  Showing future appointments within next 150 days and meeting all other requirements        The patient was identified by name and date of birth  Alan Lucas was informed that this is a telemedicine visit and that the visit is being conducted through [facility] and patient was informed that this is a secure, HIPAA-compliant platform  He agrees to proceed     My office door was closed  No one else was in the room  He acknowledged consent and understanding of privacy and security of the video platform  The patient has agreed to participate and understands they can discontinue the visit at any time  Patient is aware this is a billable service  Subjective  Alan Lucas is a 63 y o  male with a history of Waldenstrom's, clinically stable  HPI     Past Medical History:   Diagnosis Date    Anemia, unspecified 4/30/2018    COVID-19 03/17/2021    Hyperglycemia     Last Assessed:1/18/18    Respiratory tract infection due to COVID-19 virus 3/18/2021    [facility] 6/[facility]       Past Surgical History:   Procedure Laterality Date    WISDOM TOOTH EXTRACTION      Last Assessed:1/18/18       Current Outpatient Medications   Medication Sig Dispense Refill    Acetaminophen (TYLENOL) 325 MG CAPS Take by mouth as needed       Ibrutinib 140 MG CAPS Take 1 capsule (140 mg total) by mouth daily Take 2 caps by mouth each day  60 capsule 3    Multiple Vitamin (MULTI VITAMIN MENS PO) Take 1 tablet by mouth daily       No current facility-administered medications for this visit  No Known Allergies    Review of Systems   Constitutional: Negative for activity change, appetite change, fatigue, fever and unexpected weight change  Respiratory: Negative for cough and shortness of breath      Cardiovascular: Negative for chest pain and leg swelling  Gastrointestinal: Negative for abdominal pain, constipation, diarrhea and nausea  Endocrine: Negative for cold intolerance and heat intolerance  Musculoskeletal: Negative for arthralgias and myalgias  Skin: Negative  Neurological: Negative for dizziness, weakness and headaches  Hematological: Negative for adenopathy  Does not bruise/bleed easily  Video Exam    There were no vitals filed for this visit  Physical Exam  Constitutional:       Appearance: Normal appearance  He is well-developed  HENT:      Head: Normocephalic and atraumatic  Eyes:      Extraocular Movements: Extraocular movements intact  Neck:      Musculoskeletal: Normal range of motion  Pulmonary:      Effort: Pulmonary effort is normal  No respiratory distress  Musculoskeletal: Normal range of motion  Lymphadenopathy:      Cervical: No cervical adenopathy  Neurological:      Mental Status: He is alert and oriented to person, place, and time  Psychiatric:         Behavior: Behavior normal           I spent Twenty minutes directly with the patient during this visit      VIRTUAL VISIT DISCLAIMER    Fabiola Valverde acknowledges that he has consented to an online visit or consultation  He understands that the online visit is based solely on information provided by him, and that, in the absence of a face-to-face physical evaluation by the physician, the diagnosis he receives is both limited and provisional in terms of accuracy and completeness  This is not intended to replace a full medical face-to-face evaluation by the physician  Fabiola Valverde understands and accepts these terms

## 2021-04-08 NOTE — PROGRESS NOTES
[id number] [facility] [address] [address] [phone]  Progress Note  Riaz Tavarez, 1955, [id number]  4/8/2021    Assessment/Plan:  There are no diagnoses linked to this encounter  The patient is scheduled for follow-up in approximately *** with Dr Pramod Lawrence Patient*** voiced agreement and understanding to the above  Patient*** knows to call the Hematology/Oncology office with any questions and concerns regarding the above  I have spent *** minutes with {Patient /Family:36713} today in which greater than 50% of this time was spent in counseling/coordination of care regarding {AMB Counseling Topics:2353989083}  Goals and Barriers:    Current Goal: ***  Prolong Survival from Cancer  Barriers: ***None  Patient's Capacity to Self Care:  Patient *** able to self care   -------------------------------------------------------------------------------------------------------    No chief complaint on file  History of present illness/Cancer History:   Oncology History   Waldenstrom's macroglobulinemia ([facility] [address] )   6/8/2018 Biopsy    Overall, the combination of morphologic and immunophenotypic features is consistent with 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 with small cell size and overall low grade appearance  The morphologic features and nonspecific immunophenotype are most consistent with marginal zone lymphoma or lymphoplasmacytic lymphoma  The presence of a monoclonal IgM expressing plasma cell population with expression of the same light chain as the malignant B-cell population in the setting of IgM monoclonal paraprotein may favor lymphoplasmacytic lymphoma, though is not definitively specific  MYD 88 +      6/21/2018 Initial Diagnosis    Waldenstrom's macroglobulinemia 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  Additionally, he was found to have a kappa lambda ratio of 36 0  IgM 3700 with reciprocal inhibition  Skeletal survey showed no lytic bone foci  7/11/2018 -  Chemotherapy    Ibrutinib 280 mg p o  daily  Cancer Staging  No matching staging information was found for the patient  ECOG: {performance status:27196}    Interval history:  ***     Review of Systems      Current Outpatient Medications:     Acetaminophen (TYLENOL) 325 MG CAPS, Take by mouth as needed , Disp: , Rfl:     Ibrutinib 140 MG CAPS, Take 1 capsule (140 mg total) by mouth daily Take 2 caps by mouth each day , Disp: 60 capsule, Rfl: 3    Multiple Vitamin (MULTI VITAMIN MENS PO), Take 1 tablet by mouth daily, Disp: , Rfl:     No Known Allergies    Advance Directive and Living Will:        Objective: There were no vitals taken for this visit  Wt Readings from Last 6 Encounters:   03/23/21 67 6 kg (149 lb)   03/18/21 67 6 kg (149 lb)   09/24/20 69 3 kg (152 lb 12 8 oz)   01/20/20 71 5 kg (157 lb 9 6 oz)   10/07/19 70 kg (154 lb 6 4 oz)   07/08/19 68 9 kg (152 lb)       Physical Exam    Pertinent Laboratory Results and Imaging Review:  ***    The following historical data was reviewed      Past Medical History:   Diagnosis Date    Anemia, unspecified 4/30/2018    COVID-19 03/17/2021    Hyperglycemia     Last Assessed:1/18/18    Respiratory tract infection due to COVID-19 virus 3/18/2021    Waldenstrom's macroglobulinemia ([facility] [address] ) 6/21/2018       Past Surgical History:   Procedure Laterality Date    WISDOM TOOTH EXTRACTION      Last Assessed:1/18/18       Social History     Socioeconomic History    Marital status: Single     Spouse name: Not on file    Number of children: Not on file    Years of education: Not on file    Highest education level: Not on file   Occupational History    Not on file   Social Needs    Financial resource strain: Not on file    Food insecurity     Worry: Not on file Inability: Not on file    Transportation needs     Medical: Not on file     Non-medical: Not on file   Tobacco Use    Smoking status: Never Smoker    Smokeless tobacco: Never Used   Substance and Sexual Activity    Alcohol use: No    Drug use: No    Sexual activity: Not on file   Lifestyle    Physical activity     Days per week: Not on file     Minutes per session: Not on file    Stress: Not on file   Relationships    Social connections     Talks on phone: Not on file     Gets together: Not on file     Attends Church service: Not on file     Active member of club or organization: Not on file     Attends meetings of clubs or organizations: Not on file     Relationship status: Not on file    Intimate partner violence     Fear of current or ex partner: Not on file     Emotionally abused: Not on file     Physically abused: Not on file     Forced sexual activity: Not on file   Other Topics Concern    Not on file   Social History Narrative    Caffeine use       Family History   Problem Relation Age of Onset    Parkinsonism Mother     Thyroid disease Mother     Diabetes Father     Other Father         Heart Artery Stent       Please note: This report has been generated by a voice recognition software system  Therefore there may be syntax, spelling, and/or grammatical errors  Please call if you have any questions

## 2021-04-15 DIAGNOSIS — C88.0 WALDENSTROM'S MACROGLOBULINEMIA (HCC): Primary | ICD-10-CM

## 2021-04-16 ENCOUNTER — TELEPHONE (OUTPATIENT)
Dept: HEMATOLOGY ONCOLOGY | Facility: CLINIC | Age: 66
End: 2021-04-16

## 2021-07-01 ENCOUNTER — APPOINTMENT (OUTPATIENT)
Dept: LAB | Facility: CLINIC | Age: 66
End: 2021-07-01
Payer: COMMERCIAL

## 2021-07-01 DIAGNOSIS — C88.0 WALDENSTROM'S MACROGLOBULINEMIA (HCC): ICD-10-CM

## 2021-07-01 LAB
ALBUMIN SERPL BCP-MCNC: 3.8 G/DL (ref 3.5–5)
ALP SERPL-CCNC: 69 U/L (ref 46–116)
ALT SERPL W P-5'-P-CCNC: 29 U/L (ref 12–78)
ANION GAP SERPL CALCULATED.3IONS-SCNC: 7 MMOL/L (ref 4–13)
AST SERPL W P-5'-P-CCNC: 21 U/L (ref 5–45)
BASOPHILS # BLD AUTO: 0.08 THOUSANDS/ΜL (ref 0–0.1)
BASOPHILS NFR BLD AUTO: 1 % (ref 0–1)
BILIRUB SERPL-MCNC: 0.83 MG/DL (ref 0.2–1)
BUN SERPL-MCNC: 9 MG/DL (ref 5–25)
CALCIUM SERPL-MCNC: 9.1 MG/DL (ref 8.3–10.1)
CHLORIDE SERPL-SCNC: 105 MMOL/L (ref 100–108)
CO2 SERPL-SCNC: 25 MMOL/L (ref 21–32)
CREAT SERPL-MCNC: 0.85 MG/DL (ref 0.6–1.3)
EOSINOPHIL # BLD AUTO: 0.07 THOUSAND/ΜL (ref 0–0.61)
EOSINOPHIL NFR BLD AUTO: 1 % (ref 0–6)
ERYTHROCYTE [DISTWIDTH] IN BLOOD BY AUTOMATED COUNT: 12.7 % (ref 11.6–15.1)
GFR SERPL CREATININE-BSD FRML MDRD: 93 ML/MIN/1.73SQ M
GLUCOSE SERPL-MCNC: 81 MG/DL (ref 65–140)
HCT VFR BLD AUTO: 44.1 % (ref 36.5–49.3)
HGB BLD-MCNC: 15.2 G/DL (ref 12–17)
IGA SERPL-MCNC: 32 MG/DL (ref 70–400)
IGG SERPL-MCNC: 315 MG/DL (ref 700–1600)
IGM SERPL-MCNC: 90 MG/DL (ref 40–230)
IMM GRANULOCYTES # BLD AUTO: 0.05 THOUSAND/UL (ref 0–0.2)
IMM GRANULOCYTES NFR BLD AUTO: 1 % (ref 0–2)
LYMPHOCYTES # BLD AUTO: 1.36 THOUSANDS/ΜL (ref 0.6–4.47)
LYMPHOCYTES NFR BLD AUTO: 20 % (ref 14–44)
MCH RBC QN AUTO: 31.3 PG (ref 26.8–34.3)
MCHC RBC AUTO-ENTMCNC: 34.5 G/DL (ref 31.4–37.4)
MCV RBC AUTO: 91 FL (ref 82–98)
MONOCYTES # BLD AUTO: 0.63 THOUSAND/ΜL (ref 0.17–1.22)
MONOCYTES NFR BLD AUTO: 9 % (ref 4–12)
NEUTROPHILS # BLD AUTO: 4.68 THOUSANDS/ΜL (ref 1.85–7.62)
NEUTS SEG NFR BLD AUTO: 68 % (ref 43–75)
NRBC BLD AUTO-RTO: 0 /100 WBCS
PLATELET # BLD AUTO: 213 THOUSANDS/UL (ref 149–390)
PMV BLD AUTO: 11.5 FL (ref 8.9–12.7)
POTASSIUM SERPL-SCNC: 3.9 MMOL/L (ref 3.5–5.3)
PROT SERPL-MCNC: 6.5 G/DL (ref 6.4–8.2)
RBC # BLD AUTO: 4.86 MILLION/UL (ref 3.88–5.62)
SODIUM SERPL-SCNC: 137 MMOL/L (ref 136–145)
WBC # BLD AUTO: 6.87 THOUSAND/UL (ref 4.31–10.16)

## 2021-07-01 PROCEDURE — 83883 ASSAY NEPHELOMETRY NOT SPEC: CPT

## 2021-07-01 PROCEDURE — 80053 COMPREHEN METABOLIC PANEL: CPT

## 2021-07-01 PROCEDURE — 82784 ASSAY IGA/IGD/IGG/IGM EACH: CPT

## 2021-07-01 PROCEDURE — 85025 COMPLETE CBC W/AUTO DIFF WBC: CPT

## 2021-07-01 PROCEDURE — 36415 COLL VENOUS BLD VENIPUNCTURE: CPT

## 2021-07-02 LAB
KAPPA LC FREE SER-MCNC: 11 MG/L (ref 3.3–19.4)
KAPPA LC FREE/LAMBDA FREE SER: 3.33 {RATIO} (ref 0.26–1.65)
LAMBDA LC FREE SERPL-MCNC: 3.3 MG/L (ref 5.7–26.3)

## 2021-07-08 ENCOUNTER — OFFICE VISIT (OUTPATIENT)
Dept: HEMATOLOGY ONCOLOGY | Facility: CLINIC | Age: 66
End: 2021-07-08
Payer: COMMERCIAL

## 2021-07-08 VITALS
HEART RATE: 83 BPM | TEMPERATURE: 98.2 F | OXYGEN SATURATION: 98 % | DIASTOLIC BLOOD PRESSURE: 72 MMHG | HEIGHT: 67 IN | RESPIRATION RATE: 16 BRPM | BODY MASS INDEX: 23.7 KG/M2 | WEIGHT: 151 LBS | SYSTOLIC BLOOD PRESSURE: 132 MMHG

## 2021-07-08 DIAGNOSIS — D72.820 MONOCLONAL B-CELL LYMPHOCYTOSIS: ICD-10-CM

## 2021-07-08 DIAGNOSIS — C88.0 WALDENSTROM'S MACROGLOBULINEMIA (HCC): Primary | ICD-10-CM

## 2021-07-08 PROCEDURE — 99214 OFFICE O/P EST MOD 30 MIN: CPT | Performed by: NURSE PRACTITIONER

## 2021-07-08 NOTE — PROGRESS NOTES
[id number] [facility] [address]  [facility] [id number] [phone]  Progress Note  Shereen Nunez, 1955, [id number]  7/8/2021    Assessment/Plan:  1  [facility]'s [facility]globulin[facility] )  2  Monoclonal B-cell lymphocytosis   Patient is a 62-year-old male with a history of Waldenstrom's macroglobulinemia who continues on Imbruvica 280 mg p o  daily  He tolerates this without difficulty  His most recent blood work is essentially stable with immunoglobulins and light chain ratio increased at 3 3 however this is within his established range  CBC is within normal limits  Overall patient is doing well and able to function without restriction  He states he had COVID in March 2021 and really did not feel back to himself until June 2021  He states he will likely receive the vaccination however we will wait until the fall  We will plan to see him in 3 months with repeat blood work  Patient verbalized understanding is in agreement with the plan  - CBC and differential; Future  - Comprehensive metabolic panel; Future  - IgG, IgA, IgM; Future  - Immunoglobulin free LT chains blood; Future    Goals and Barriers:    Current Goal:   Prolong Survival from Cancer  Barriers: None  Patient's Capacity to Self Care:  Patient is able to self care   -------------------------------------------------------------------------------------------------------    Chief Complaint   Patient presents with    Follow-up       History of present illness/Cancer History:   Oncology History   Waldenstrom's macroglobulinemia ([facility] )   6/8/2018 Biopsy    Overall, the combination of morphologic and immunophenotypic features is consistent with 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 with small cell size and overall low grade appearance   The morphologic features and nonspecific immunophenotype are most consistent with marginal zone lymphoma or lymphoplasmacytic lymphoma  The presence of a monoclonal IgM expressing plasma cell population with expression of the same light chain as the malignant B-cell population in the setting of IgM monoclonal paraprotein may favor lymphoplasmacytic lymphoma, though is not definitively specific  MYD 88 +      2018 Initial Diagnosis    Waldenstrom's macroglobulinemia [facility])  2018 patient had hernia repair  Leukocytosis was noted  Peripheral blood flow cytometry showed findings consistent with monoclonal B-cell lymphocytosis  Additionally, he was found to have a kappa lambda ratio of 36 0  IgM 3700 with reciprocal inhibition  Skeletal survey showed no lytic bone foci  2018 -  Chemotherapy    Ibrutinib 280 mg p o  daily  Cancer Staging  No matching staging information was found for the patient  ECO - Asymptomatic    Interval history:  Clinically stable     Review of Systems   Constitutional: Negative for activity change, appetite change, fatigue, fever and unexpected weight change  Respiratory: Negative for cough and shortness of breath  Cardiovascular: Negative for chest pain and leg swelling  Gastrointestinal: Negative for abdominal pain, constipation, diarrhea and nausea  Endocrine: Negative for cold intolerance and heat intolerance  Musculoskeletal: Negative for arthralgias and myalgias  Skin: Negative  Neurological: Negative for dizziness, weakness and headaches  Hematological: Negative for adenopathy  Does not bruise/bleed easily           Current Outpatient Medications:     Acetaminophen (TYLENOL) 325 MG CAPS, Take by mouth as needed , Disp: , Rfl:     Ibrutinib 140 MG CAPS, Take 1 capsule (140 mg total) by mouth daily Take 2 caps by mouth each day , Disp: 60 capsule, Rfl: 3    Multiple Vitamin (MULTI VITAMIN MENS PO), Take 1 tablet by mouth daily, Disp: , Rfl:     No Known Allergies    Advance Directive and Living Will:        Objective:   /72 (BP Location: Left arm, Patient Position: Sitting, Cuff Size: Standard)   Pulse 83   Temp 98 2 °F (36 8 °C) (Tympanic)   Resp 16   Ht 5' 7" (1 702 m)   Wt 68 5 kg (151 lb)   SpO2 98%   BMI 23 65 kg/m²   Wt Readings from Last 6 Encounters:   07/08/21 68 5 kg (151 lb)   03/23/21 67 6 kg (149 lb)   03/18/21 67 6 kg (149 lb)   09/24/20 69 3 kg (152 lb 12 8 oz)   01/20/20 71 5 kg (157 lb 9 6 oz)   10/07/19 70 kg (154 lb 6 4 oz)       Physical Exam  Constitutional:       Appearance: He is well-developed  HENT:      Head: Normocephalic and atraumatic  Eyes:      Pupils: Pupils are equal, round, and reactive to light  Cardiovascular:      Rate and Rhythm: Normal rate and regular rhythm  Heart sounds: Normal heart sounds  Pulmonary:      Effort: Pulmonary effort is normal  No respiratory distress  Breath sounds: Normal breath sounds  Abdominal:      General: Bowel sounds are normal       Palpations: Abdomen is soft  Musculoskeletal:         General: Normal range of motion  Cervical back: Normal range of motion  Lymphadenopathy:      Cervical: No cervical adenopathy  Skin:     General: Skin is warm and dry  Capillary Refill: Capillary refill takes less than 2 seconds  Neurological:      Mental Status: He is alert and oriented to person, place, and time  Psychiatric:         Behavior: Behavior normal          Pertinent Laboratory Results and Imaging Review:  No visits with results within 1 Week(s) from this visit     Latest known visit with results is:   Appointment on 07/01/2021   Component Date Value Ref Range Status    WBC 07/01/2021 6 87  4 31 - 10 16 Thousand/uL Final    RBC 07/01/2021 4 86  3 88 - 5 62 Million/uL Final    Hemoglobin 07/01/2021 15 2  12 0 - 17 0 g/dL Final    Hematocrit 07/01/2021 44 1  36 5 - 49 3 % Final    MCV 07/01/2021 91  82 - 98 fL Final    MCH 07/01/2021 31 3  26 8 - 34 3 pg Final    MCHC 07/01/2021 34 5  31 4 - 37 4 g/dL Final    RDW 07/01/2021 12 7  11 6 - 15 1 % Final    MPV 07/01/2021 11 5  8 9 - 12 7 fL Final    Platelets 45/55/4316 213  149 - 390 Thousands/uL Final    nRBC 07/01/2021 0  /100 WBCs Final    Neutrophils Relative 07/01/2021 68  43 - 75 % Final    Immat GRANS % 07/01/2021 1  0 - 2 % Final    Lymphocytes Relative 07/01/2021 20  14 - 44 % Final    Monocytes Relative 07/01/2021 9  4 - 12 % Final    Eosinophils Relative 07/01/2021 1  0 - 6 % Final    Basophils Relative 07/01/2021 1  0 - 1 % Final    Neutrophils Absolute 07/01/2021 4 68  1 85 - 7 62 Thousands/µL Final    Immature Grans Absolute 07/01/2021 0 05  0 00 - 0 20 Thousand/uL Final    Lymphocytes Absolute 07/01/2021 1 36  0 60 - 4 47 Thousands/µL Final    Monocytes Absolute 07/01/2021 0 63  0 17 - 1 22 Thousand/µL Final    Eosinophils Absolute 07/01/2021 0 07  0 00 - 0 61 Thousand/µL Final    Basophils Absolute 07/01/2021 0 08  0 00 - 0 10 Thousands/µL Final    Sodium 07/01/2021 137  136 - 145 mmol/L Final    Potassium 07/01/2021 3 9  3 5 - 5 3 mmol/L Final    Chloride 07/01/2021 105  100 - 108 mmol/L Final    CO2 07/01/2021 25  21 - 32 mmol/L Final    ANION GAP 07/01/2021 7  4 - 13 mmol/L Final    BUN 07/01/2021 9  5 - 25 mg/dL Final    Creatinine 07/01/2021 0 85  0 60 - 1 30 mg/dL Final    Standardized to IDMS reference method    Glucose 07/01/2021 81  65 - 140 mg/dL Final    If the patient is fasting, the ADA then defines impaired fasting glucose as > 100 mg/dL and diabetes as > or equal to 123 mg/dL  Specimen collection should occur prior to Sulfasalazine administration due to the potential for falsely depressed results  Specimen collection should occur prior to Sulfapyridine administration due to the potential for falsely elevated results   Calcium 07/01/2021 9 1  8 3 - 10 1 mg/dL Final    AST 07/01/2021 21  5 - 45 U/L Final    Specimen collection should occur prior to Sulfasalazine administration due to the potential for falsely depressed results       ALT 07/01/2021 29 12 - 78 U/L Final    Specimen collection should occur prior to Sulfasalazine and/or Sulfapyridine administration due to the potential for falsely depressed results   Alkaline Phosphatase 07/01/2021 69  46 - 116 U/L Final    Total Protein 07/01/2021 6 5  6 4 - 8 2 g/dL Final    Albumin 07/01/2021 3 8  3 5 - 5 0 g/dL Final    Total Bilirubin 07/01/2021 0 83  0 20 - 1 00 mg/dL Final    Use of this assay is not recommended for patients undergoing treatment with eltrombopag due to the potential for falsely elevated results   eGFR 07/01/2021 93  ml/min/1 73sq m Final    Ig [place] Free Light Chain 07/01/2021 11 0  3 3 - 19 4 mg/L Final    Ig Lambda Free Light Chain 07/01/2021 3 3* 5 7 - 26 3 mg/L Final    Kappa/Lambda FluidC Ratio 07/01/2021 3 33* 0 26 - 1 65 Final    IGA 07/01/2021 32 0* 70 0 - 400 0 mg/dL Final    IGG 07/01/2021 315 0* 700 0-1,600 0 mg/dL Final    IGM 07/01/2021 90 0  40 0 - 230 0 mg/dL Final         The following historical data was reviewed      Past Medical History:   Diagnosis Date    Anemia, unspecified 4/30/2018    COVID-19 03/17/2021    Hyperglycemia     Last Assessed:1/18/18    Respiratory tract infection due to COVID-19 virus 3/18/2021    Waldenstrom's macroglobulinemia ([facility] [address] ) 6/21/2018       Past Surgical History:   Procedure Laterality Date    WISDOM TOOTH EXTRACTION      Last Assessed:1/18/18       Social History     Socioeconomic History    Marital status: Single     Spouse name: None    Number of children: None    Years of education: None    Highest education level: None   Occupational History    None   Tobacco Use    Smoking status: Never Smoker    Smokeless tobacco: Never Used   Vaping Use    Vaping Use: Never used   Substance and Sexual Activity    Alcohol use: No    Drug use: No    Sexual activity: None   Other Topics Concern    None   Social History Narrative    Caffeine use     Social Determinants of Health     Financial Resource Strain:     Difficulty of Paying Living Expenses:    Food Insecurity:     Worried About [address] [facility] in the Last Year:     [address] [facility] [address] in the Last Year:    Transportation Needs:     Lack of Transportation (Medical):  Lack of Transportation (Non-Medical):    Physical Activity:     Days of Exercise per Week:     Minutes of Exercise per Session:    Stress:     Feeling of Stress :    Social Connections:     Frequency of Communication with Friends and Family:     Frequency of Social Gatherings with Friends and Family:     Attends L[employer] Services:     Active Member of Clubs or Organizations:     Attends Club or Organization Meetings:     Marital Status:    Intimate Partner Violence:     Fear of Current or Ex-Partner:     Emotionally Abused:     Physically Abused:     Sexually Abused:        Family History   Problem Relation Age of Onset    Parkinsonism Mother     Thyroid disease Mother     Diabetes Father     Other Father         Heart Artery Stent       Please note: This report has been generated by a voice recognition software system  Therefore there may be syntax, spelling, and/or grammatical errors  Please call if you have any questions

## 2021-08-10 DIAGNOSIS — C88.0 WALDENSTROM'S MACROGLOBULINEMIA (HCC): ICD-10-CM

## 2021-08-10 RX ORDER — IBRUTINIB 140 MG/1
CAPSULE ORAL
Qty: 60 CAPSULE | Refills: 10 | Status: SHIPPED | OUTPATIENT
Start: 2021-08-10 | End: 2022-07-27

## 2021-08-26 ENCOUNTER — DOCUMENTATION (OUTPATIENT)
Dept: HEMATOLOGY ONCOLOGY | Facility: CLINIC | Age: 66
End: 2021-08-26

## 2021-09-29 ENCOUNTER — APPOINTMENT (OUTPATIENT)
Dept: LAB | Facility: CLINIC | Age: 66
End: 2021-09-29
Payer: COMMERCIAL

## 2021-09-29 DIAGNOSIS — C88.0 WALDENSTROM'S MACROGLOBULINEMIA (HCC): ICD-10-CM

## 2021-09-29 DIAGNOSIS — D72.820 MONOCLONAL B-CELL LYMPHOCYTOSIS: ICD-10-CM

## 2021-09-29 LAB
ALBUMIN SERPL BCP-MCNC: 4.2 G/DL (ref 3.5–5)
ALP SERPL-CCNC: 75 U/L (ref 46–116)
ALT SERPL W P-5'-P-CCNC: 33 U/L (ref 12–78)
ANION GAP SERPL CALCULATED.3IONS-SCNC: 5 MMOL/L (ref 4–13)
AST SERPL W P-5'-P-CCNC: 21 U/L (ref 5–45)
BASOPHILS # BLD AUTO: 0.08 THOUSANDS/ΜL (ref 0–0.1)
BASOPHILS NFR BLD AUTO: 1 % (ref 0–1)
BILIRUB SERPL-MCNC: 0.82 MG/DL (ref 0.2–1)
BUN SERPL-MCNC: 11 MG/DL (ref 5–25)
CALCIUM SERPL-MCNC: 8.7 MG/DL (ref 8.3–10.1)
CHLORIDE SERPL-SCNC: 105 MMOL/L (ref 100–108)
CO2 SERPL-SCNC: 25 MMOL/L (ref 21–32)
CREAT SERPL-MCNC: 0.8 MG/DL (ref 0.6–1.3)
EOSINOPHIL # BLD AUTO: 0.11 THOUSAND/ΜL (ref 0–0.61)
EOSINOPHIL NFR BLD AUTO: 1 % (ref 0–6)
ERYTHROCYTE [DISTWIDTH] IN BLOOD BY AUTOMATED COUNT: 12 % (ref 11.6–15.1)
GFR SERPL CREATININE-BSD FRML MDRD: 94 ML/MIN/1.73SQ M
GLUCOSE SERPL-MCNC: 88 MG/DL (ref 65–140)
HCT VFR BLD AUTO: 44.6 % (ref 36.5–49.3)
HGB BLD-MCNC: 15.8 G/DL (ref 12–17)
IGA SERPL-MCNC: 35 MG/DL (ref 70–400)
IGG SERPL-MCNC: 279 MG/DL (ref 700–1600)
IGM SERPL-MCNC: 80 MG/DL (ref 40–230)
IMM GRANULOCYTES # BLD AUTO: 0.05 THOUSAND/UL (ref 0–0.2)
IMM GRANULOCYTES NFR BLD AUTO: 1 % (ref 0–2)
LYMPHOCYTES # BLD AUTO: 1.68 THOUSANDS/ΜL (ref 0.6–4.47)
LYMPHOCYTES NFR BLD AUTO: 22 % (ref 14–44)
MCH RBC QN AUTO: 31.7 PG (ref 26.8–34.3)
MCHC RBC AUTO-ENTMCNC: 35.4 G/DL (ref 31.4–37.4)
MCV RBC AUTO: 89 FL (ref 82–98)
MONOCYTES # BLD AUTO: 0.65 THOUSAND/ΜL (ref 0.17–1.22)
MONOCYTES NFR BLD AUTO: 8 % (ref 4–12)
NEUTROPHILS # BLD AUTO: 5.14 THOUSANDS/ΜL (ref 1.85–7.62)
NEUTS SEG NFR BLD AUTO: 67 % (ref 43–75)
NRBC BLD AUTO-RTO: 0 /100 WBCS
PLATELET # BLD AUTO: 217 THOUSANDS/UL (ref 149–390)
PMV BLD AUTO: 11.1 FL (ref 8.9–12.7)
POTASSIUM SERPL-SCNC: 3.9 MMOL/L (ref 3.5–5.3)
PROT SERPL-MCNC: 6.8 G/DL (ref 6.4–8.2)
RBC # BLD AUTO: 4.99 MILLION/UL (ref 3.88–5.62)
SODIUM SERPL-SCNC: 135 MMOL/L (ref 136–145)
WBC # BLD AUTO: 7.71 THOUSAND/UL (ref 4.31–10.16)

## 2021-09-29 PROCEDURE — 83883 ASSAY NEPHELOMETRY NOT SPEC: CPT

## 2021-09-29 PROCEDURE — 36415 COLL VENOUS BLD VENIPUNCTURE: CPT

## 2021-09-29 PROCEDURE — 85025 COMPLETE CBC W/AUTO DIFF WBC: CPT

## 2021-09-29 PROCEDURE — 80053 COMPREHEN METABOLIC PANEL: CPT

## 2021-09-29 PROCEDURE — 82784 ASSAY IGA/IGD/IGG/IGM EACH: CPT

## 2021-09-30 LAB
KAPPA LC FREE SER-MCNC: 11.6 MG/L (ref 3.3–19.4)
KAPPA LC FREE/LAMBDA FREE SER: 3.63 {RATIO} (ref 0.26–1.65)
LAMBDA LC FREE SERPL-MCNC: 3.2 MG/L (ref 5.7–26.3)

## 2021-10-05 ENCOUNTER — TELEPHONE (OUTPATIENT)
Dept: HEMATOLOGY ONCOLOGY | Facility: CLINIC | Age: 66
End: 2021-10-05

## 2021-10-06 ENCOUNTER — OFFICE VISIT (OUTPATIENT)
Dept: HEMATOLOGY ONCOLOGY | Facility: CLINIC | Age: 66
End: 2021-10-06
Payer: COMMERCIAL

## 2021-10-06 VITALS
TEMPERATURE: 96.8 F | WEIGHT: 153 LBS | SYSTOLIC BLOOD PRESSURE: 118 MMHG | DIASTOLIC BLOOD PRESSURE: 72 MMHG | RESPIRATION RATE: 16 BRPM | HEIGHT: 67 IN | BODY MASS INDEX: 24.01 KG/M2 | HEART RATE: 92 BPM

## 2021-10-06 DIAGNOSIS — C88.0 WALDENSTROM'S MACROGLOBULINEMIA (HCC): Primary | ICD-10-CM

## 2021-10-06 PROCEDURE — 3008F BODY MASS INDEX DOCD: CPT | Performed by: NURSE PRACTITIONER

## 2021-10-06 PROCEDURE — 1036F TOBACCO NON-USER: CPT | Performed by: NURSE PRACTITIONER

## 2021-10-06 PROCEDURE — 99214 OFFICE O/P EST MOD 30 MIN: CPT | Performed by: NURSE PRACTITIONER

## 2021-10-29 ENCOUNTER — IMMUNIZATIONS (OUTPATIENT)
Dept: INTERNAL MEDICINE CLINIC | Facility: CLINIC | Age: 66
End: 2021-10-29
Payer: COMMERCIAL

## 2021-10-29 DIAGNOSIS — Z23 NEED FOR INFLUENZA VACCINATION: Primary | ICD-10-CM

## 2021-10-29 PROCEDURE — 90471 IMMUNIZATION ADMIN: CPT

## 2021-10-29 PROCEDURE — 90682 RIV4 VACC RECOMBINANT DNA IM: CPT

## 2021-12-27 ENCOUNTER — TELEPHONE (OUTPATIENT)
Dept: INTERNAL MEDICINE CLINIC | Facility: CLINIC | Age: 66
End: 2021-12-27

## 2021-12-27 ENCOUNTER — APPOINTMENT (OUTPATIENT)
Dept: LAB | Facility: CLINIC | Age: 66
End: 2021-12-27
Payer: COMMERCIAL

## 2021-12-27 DIAGNOSIS — C88.0 WALDENSTROM'S MACROGLOBULINEMIA (HCC): ICD-10-CM

## 2021-12-27 LAB
ALBUMIN SERPL BCP-MCNC: 4.2 G/DL (ref 3.5–5)
ALP SERPL-CCNC: 69 U/L (ref 46–116)
ALT SERPL W P-5'-P-CCNC: 31 U/L (ref 12–78)
ANION GAP SERPL CALCULATED.3IONS-SCNC: 4 MMOL/L (ref 4–13)
AST SERPL W P-5'-P-CCNC: 18 U/L (ref 5–45)
BASOPHILS # BLD AUTO: 0.09 THOUSANDS/ΜL (ref 0–0.1)
BASOPHILS NFR BLD AUTO: 1 % (ref 0–1)
BILIRUB SERPL-MCNC: 0.63 MG/DL (ref 0.2–1)
BUN SERPL-MCNC: 15 MG/DL (ref 5–25)
CALCIUM SERPL-MCNC: 9.4 MG/DL (ref 8.3–10.1)
CHLORIDE SERPL-SCNC: 105 MMOL/L (ref 100–108)
CO2 SERPL-SCNC: 29 MMOL/L (ref 21–32)
CREAT SERPL-MCNC: 0.9 MG/DL (ref 0.6–1.3)
EOSINOPHIL # BLD AUTO: 0.06 THOUSAND/ΜL (ref 0–0.61)
EOSINOPHIL NFR BLD AUTO: 1 % (ref 0–6)
ERYTHROCYTE [DISTWIDTH] IN BLOOD BY AUTOMATED COUNT: 12.2 % (ref 11.6–15.1)
GFR SERPL CREATININE-BSD FRML MDRD: 89 ML/MIN/1.73SQ M
GLUCOSE SERPL-MCNC: 98 MG/DL (ref 65–140)
HCT VFR BLD AUTO: 46.1 % (ref 36.5–49.3)
HGB BLD-MCNC: 15.7 G/DL (ref 12–17)
IGA SERPL-MCNC: 39 MG/DL (ref 70–400)
IGG SERPL-MCNC: 295 MG/DL (ref 700–1600)
IGM SERPL-MCNC: 84 MG/DL (ref 40–230)
IMM GRANULOCYTES # BLD AUTO: 0.1 THOUSAND/UL (ref 0–0.2)
IMM GRANULOCYTES NFR BLD AUTO: 1 % (ref 0–2)
LYMPHOCYTES # BLD AUTO: 1.44 THOUSANDS/ΜL (ref 0.6–4.47)
LYMPHOCYTES NFR BLD AUTO: 18 % (ref 14–44)
MCH RBC QN AUTO: 30.8 PG (ref 26.8–34.3)
MCHC RBC AUTO-ENTMCNC: 34.1 G/DL (ref 31.4–37.4)
MCV RBC AUTO: 91 FL (ref 82–98)
MONOCYTES # BLD AUTO: 0.58 THOUSAND/ΜL (ref 0.17–1.22)
MONOCYTES NFR BLD AUTO: 7 % (ref 4–12)
NEUTROPHILS # BLD AUTO: 5.86 THOUSANDS/ΜL (ref 1.85–7.62)
NEUTS SEG NFR BLD AUTO: 72 % (ref 43–75)
NRBC BLD AUTO-RTO: 0 /100 WBCS
PLATELET # BLD AUTO: 221 THOUSANDS/UL (ref 149–390)
PMV BLD AUTO: 11 FL (ref 8.9–12.7)
POTASSIUM SERPL-SCNC: 4.3 MMOL/L (ref 3.5–5.3)
PROT SERPL-MCNC: 6.7 G/DL (ref 6.4–8.2)
RBC # BLD AUTO: 5.09 MILLION/UL (ref 3.88–5.62)
SODIUM SERPL-SCNC: 138 MMOL/L (ref 136–145)
WBC # BLD AUTO: 8.13 THOUSAND/UL (ref 4.31–10.16)

## 2021-12-27 PROCEDURE — 80053 COMPREHEN METABOLIC PANEL: CPT

## 2021-12-27 PROCEDURE — 85025 COMPLETE CBC W/AUTO DIFF WBC: CPT

## 2021-12-27 PROCEDURE — 83883 ASSAY NEPHELOMETRY NOT SPEC: CPT

## 2021-12-27 PROCEDURE — 36415 COLL VENOUS BLD VENIPUNCTURE: CPT

## 2021-12-27 PROCEDURE — 82784 ASSAY IGA/IGD/IGG/IGM EACH: CPT

## 2021-12-28 LAB
KAPPA LC FREE SER-MCNC: 10.7 MG/L (ref 3.3–19.4)
KAPPA LC FREE/LAMBDA FREE SER: 3.45 {RATIO} (ref 0.26–1.65)
LAMBDA LC FREE SERPL-MCNC: 3.1 MG/L (ref 5.7–26.3)

## 2022-01-05 ENCOUNTER — OFFICE VISIT (OUTPATIENT)
Dept: HEMATOLOGY ONCOLOGY | Facility: CLINIC | Age: 67
End: 2022-01-05
Payer: MEDICARE

## 2022-01-05 VITALS
HEIGHT: 67 IN | TEMPERATURE: 99 F | RESPIRATION RATE: 16 BRPM | DIASTOLIC BLOOD PRESSURE: 76 MMHG | SYSTOLIC BLOOD PRESSURE: 126 MMHG | WEIGHT: 154 LBS | HEART RATE: 86 BPM | BODY MASS INDEX: 24.17 KG/M2 | OXYGEN SATURATION: 97 %

## 2022-01-05 DIAGNOSIS — C88.0 WALDENSTROM'S MACROGLOBULINEMIA (HCC): Primary | ICD-10-CM

## 2022-01-05 PROCEDURE — 99213 OFFICE O/P EST LOW 20 MIN: CPT | Performed by: NURSE PRACTITIONER

## 2022-01-05 NOTE — PROGRESS NOTES
[id number] [facility] ZO  [address] [address] [address] [place] [address] [phone]  Progress Note  Summer Holloway, 1955, [id number]  1/5/2022    Assessment/Plan:  1  Waldenstrom's macroglobulinemia [facility])   Patient is a 79-year-old male with a history of Waldenstrom's currently on ibrutinib 280 mg p o  daily  He is tolerating this without difficulty  We reviewed his most recent blood work, kappa lambda ratio 3 45, within his established range  Quantitative immunoglobulins are also within his established range  CBC and CMP are within normal limits  Overall patient is doing well although has increased stress secondary to his mother's health complications  She fell requiring surgery to her hip and is now in rehab  He is hopeful to get her back home in the near future  We will plan on seeing him in 4 months with repeat blood work  Patient verbalized understanding is agreement with the plan  - CBC and differential; Future  - Comprehensive metabolic panel; Future  - IgG, IgA, IgM; Future  - Immunoglobulin free LT chains blood; Future    Goals and Barriers:    Current Goal:   Prolong Survival from Cancer  Barriers: None  Patient's Capacity to Self Care:  Patient is able to self care   -------------------------------------------------------------------------------------------------------    Chief Complaint   Patient presents with    Follow-up       History of present illness/Cancer History:   Oncology History   Waldenstrom's macroglobulinemia ([facility] [address] )   6/8/2018 Biopsy    Overall, the combination of morphologic and immunophenotypic features is consistent with 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 with small cell size and overall low grade appearance  The morphologic features and nonspecific immunophenotype are most consistent with marginal zone lymphoma or lymphoplasmacytic lymphoma   The presence of a monoclonal IgM expressing plasma cell population with expression of the same light chain as the malignant B-cell population in the setting of IgM monoclonal paraprotein may favor lymphoplasmacytic lymphoma, though is not definitively specific  MYD 88 +      2018 Initial Diagnosis    Waldenstrom's macroglobulinemia [facility])  2018 patient had hernia repair  Leukocytosis was noted  Peripheral blood flow cytometry showed findings consistent with monoclonal B-cell lymphocytosis  Additionally, he was found to have a kappa lambda ratio of 36 0  IgM 3700 with reciprocal inhibition  Skeletal survey showed no lytic bone foci  2018 -  Chemotherapy    Ibrutinib 280 mg p o  daily  Cancer Staging  No matching staging information was found for the patient  ECO - Asymptomatic    Interval history:  Clinically stable     Review of Systems   Constitutional: Negative for activity change, appetite change, fatigue, fever and unexpected weight change  Respiratory: Negative for cough and shortness of breath  Cardiovascular: Negative for chest pain and leg swelling  Gastrointestinal: Negative for abdominal pain, constipation, diarrhea and nausea  Endocrine: Negative for cold intolerance and heat intolerance  Musculoskeletal: Negative for arthralgias and myalgias  Skin: Negative  Neurological: Negative for dizziness, weakness and headaches  Hematological: Negative for adenopathy  Does not bruise/bleed easily  Psychiatric/Behavioral: The patient is nervous/anxious            Current Outpatient Medications:     Acetaminophen (TYLENOL) 325 MG CAPS, Take by mouth as needed , Disp: , Rfl:     Imbruvica 140 MG CAPS, Take 2 capsules (280 mg total) by mouth once each day , Disp: 60 capsule, Rfl: 10    Multiple Vitamin (MULTI VITAMIN MENS PO), Take 1 tablet by mouth daily, Disp: , Rfl:     No Known Allergies    Advance Directive and Living Will:        Objective:   /76 (BP Location: Left arm, Patient Position: Sitting, Cuff Size: Large)   Pulse 86   Temp 99 °F (37 2 °C) (Tympanic)   Resp 16   Ht 5' 7" (1 702 m)   Wt 69 9 kg (154 lb)   SpO2 97%   BMI 24 12 kg/m²   Wt Readings from Last 6 Encounters:   01/05/22 69 9 kg (154 lb)   10/06/21 69 4 kg (153 lb)   07/08/21 68 5 kg (151 lb)   03/23/21 67 6 kg (149 lb)   03/18/21 67 6 kg (149 lb)   09/24/20 69 3 kg (152 lb 12 8 oz)       Physical Exam  Vitals reviewed  Constitutional:       Appearance: Normal appearance  He is well-developed  HENT:      Head: Normocephalic and atraumatic  Eyes:      Pupils: Pupils are equal, round, and reactive to light  Cardiovascular:      Rate and Rhythm: Normal rate and regular rhythm  Pulses: Normal pulses  Heart sounds: Normal heart sounds  Pulmonary:      Effort: Pulmonary effort is normal  No respiratory distress  Breath sounds: Normal breath sounds  Abdominal:      General: Bowel sounds are normal       Palpations: Abdomen is soft  Musculoskeletal:         General: Normal range of motion  Cervical back: Normal range of motion  Lymphadenopathy:      Cervical: No cervical adenopathy  Skin:     General: Skin is warm and dry  Capillary Refill: Capillary refill takes less than 2 seconds  Neurological:      Mental Status: He is alert and oriented to person, place, and time  Psychiatric:         Behavior: Behavior normal          Pertinent Laboratory Results and Imaging Review:  No visits with results within 1 Week(s) from this visit     Latest known visit with results is:   Appointment on 12/27/2021   Component Date Value Ref Range Status    WBC 12/27/2021 8 13  4 31 - 10 16 Thousand/uL Final    RBC 12/27/2021 5 09  3 88 - 5 62 Million/uL Final    Hemoglobin 12/27/2021 15 7  12 0 - 17 0 g/dL Final    Hematocrit 12/27/2021 46 1  36 5 - 49 3 % Final    MCV 12/27/2021 91  82 - 98 fL Final    MCH 12/27/2021 30 8  26 8 - 34 3 pg Final    MCHC 12/27/2021 34 1  31 4 - 37 4 g/dL Final    RDW 12/27/2021 12 2  11 6 - 15 1 % Final    MPV 12/27/2021 11 0  8 9 - 12 7 fL Final    Platelets 44/54/6922 221  149 - 390 Thousands/uL Final    nRBC 12/27/2021 0  /100 WBCs Final    Neutrophils Relative 12/27/2021 72  43 - 75 % Final    Immat GRANS % 12/27/2021 1  0 - 2 % Final    Lymphocytes Relative 12/27/2021 18  14 - 44 % Final    Monocytes Relative 12/27/2021 7  4 - 12 % Final    Eosinophils Relative 12/27/2021 1  0 - 6 % Final    Basophils Relative 12/27/2021 1  0 - 1 % Final    Neutrophils Absolute 12/27/2021 5 86  1 85 - 7 62 Thousands/µL Final    Immature Grans Absolute 12/27/2021 0 10  0 00 - 0 20 Thousand/uL Final    Lymphocytes Absolute 12/27/2021 1 44  0 60 - 4 47 Thousands/µL Final    Monocytes Absolute 12/27/2021 0 58  0 17 - 1 22 Thousand/µL Final    Eosinophils Absolute 12/27/2021 0 06  0 00 - 0 61 Thousand/µL Final    Basophils Absolute 12/27/2021 0 09  0 00 - 0 10 Thousands/µL Final    Sodium 12/27/2021 138  136 - 145 mmol/L Final    Potassium 12/27/2021 4 3  3 5 - 5 3 mmol/L Final    Chloride 12/27/2021 105  100 - 108 mmol/L Final    CO2 12/27/2021 29  21 - 32 mmol/L Final    ANION GAP 12/27/2021 4  4 - 13 mmol/L Final    BUN 12/27/2021 15  5 - 25 mg/dL Final    Creatinine 12/27/2021 0 90  0 60 - 1 30 mg/dL Final    Standardized to IDMS reference method    Glucose 12/27/2021 98  65 - 140 mg/dL Final    If the patient is fasting, the ADA then defines impaired fasting glucose as > 100 mg/dL and diabetes as > or equal to 123 mg/dL  Specimen collection should occur prior to Sulfasalazine administration due to the potential for falsely depressed results  Specimen collection should occur prior to Sulfapyridine administration due to the potential for falsely elevated results   Calcium 12/27/2021 9 4  8 3 - 10 1 mg/dL Final    AST 12/27/2021 18  5 - 45 U/L Final    Specimen collection should occur prior to Sulfasalazine administration due to the potential for falsely depressed results   ALT 12/27/2021 31  12 - 78 U/L Final    Specimen collection should occur prior to Sulfasalazine and/or Sulfapyridine administration due to the potential for falsely depressed results   Alkaline Phosphatase 12/27/2021 69  46 - 116 U/L Final    Total Protein 12/27/2021 6 7  6 4 - 8 2 g/dL Final    Albumin 12/27/2021 4 2  3 5 - 5 0 g/dL Final    Total Bilirubin 12/27/2021 0 63  0 20 - 1 00 mg/dL Final    Use of this assay is not recommended for patients undergoing treatment with eltrombopag due to the potential for falsely elevated results   eGFR 12/27/2021 89  ml/min/1 73sq m Final    IGA 12/27/2021 39 0* 70 0 - 400 0 mg/dL Final    IGG 12/27/2021 295 0* 700 0-1,600 0 mg/dL Final    IGM 12/27/2021 84 0  40 0 - 230 0 mg/dL Final    Ig Kappa Free Light Chain 12/27/2021 10 7  3 3 - 19 4 mg/L Final    Ig Lambda Free Light Chain 12/27/2021 3 1* 5 7 - 26 3 mg/L Final    Kappa/Lambda FluidC Ratio 12/27/2021 3 45* 0 26 - 1 65 Final       The following historical data was reviewed      Past Medical History:   Diagnosis Date    Anemia, unspecified 4/30/2018    COVID-19 03/17/2021    Hyperglycemia     Last Assessed:1/18/18    Respiratory tract infection due to COVID-19 virus 3/18/2021    [facility] 6/21/2018       Past Surgical History:   Procedure Laterality Date    WISDOM TOOTH EXTRACTION      Last Assessed:1/18/18       Social History     Socioeconomic History    Marital status: Single     Spouse name: None    Number of children: None    Years of education: None    Highest education level: None   Occupational History    None   Tobacco Use    Smoking status: Never Smoker    Smokeless tobacco: Never Used   Vaping Use    Vaping Use: Never used   Substance and Sexual Activity    Alcohol use: No    Drug use: No    Sexual activity: None   Other Topics Concern    None   Social History Narrative    Caffeine use     Social Determinants of Health     Financial Resource Strain: Not on file   Food Insecurity: Not on file   Transportation Needs: Not on file   Physical Activity: Not on file   Stress: Not on file   Social Connections: Not on file   Intimate Partner Violence: Not on file   Housing Stability: Not on file       Family History   Problem Relation Age of Onset    Parkinsonism Mother     Thyroid disease Mother     Diabetes Father     Other Father         Heart Artery Stent       Please note: This report has been generated by a voice recognition software system  Therefore there may be syntax, spelling, and/or grammatical errors  Please call if you have any questions

## 2022-02-18 ENCOUNTER — CLINICAL SUPPORT (OUTPATIENT)
Dept: INTERNAL MEDICINE CLINIC | Facility: CLINIC | Age: 67
End: 2022-02-18
Payer: MEDICARE

## 2022-02-18 DIAGNOSIS — Z23 NEED FOR TDAP VACCINATION: Primary | ICD-10-CM

## 2022-02-18 PROCEDURE — 90471 IMMUNIZATION ADMIN: CPT

## 2022-02-18 PROCEDURE — 90715 TDAP VACCINE 7 YRS/> IM: CPT

## 2022-04-27 ENCOUNTER — APPOINTMENT (OUTPATIENT)
Dept: LAB | Facility: CLINIC | Age: 67
End: 2022-04-27
Payer: MEDICARE

## 2022-04-27 DIAGNOSIS — C88.0 WALDENSTROM'S MACROGLOBULINEMIA (HCC): ICD-10-CM

## 2022-04-27 LAB
ALBUMIN SERPL BCP-MCNC: 4.1 G/DL (ref 3.5–5)
ALP SERPL-CCNC: 78 U/L (ref 46–116)
ALT SERPL W P-5'-P-CCNC: 30 U/L (ref 12–78)
ANION GAP SERPL CALCULATED.3IONS-SCNC: 3 MMOL/L (ref 4–13)
AST SERPL W P-5'-P-CCNC: 20 U/L (ref 5–45)
BASOPHILS # BLD AUTO: 0.09 THOUSANDS/ΜL (ref 0–0.1)
BASOPHILS NFR BLD AUTO: 1 % (ref 0–1)
BILIRUB SERPL-MCNC: 0.72 MG/DL (ref 0.2–1)
BUN SERPL-MCNC: 11 MG/DL (ref 5–25)
CALCIUM SERPL-MCNC: 9.2 MG/DL (ref 8.3–10.1)
CHLORIDE SERPL-SCNC: 106 MMOL/L (ref 100–108)
CO2 SERPL-SCNC: 29 MMOL/L (ref 21–32)
CREAT SERPL-MCNC: 0.92 MG/DL (ref 0.6–1.3)
EOSINOPHIL # BLD AUTO: 0.15 THOUSAND/ΜL (ref 0–0.61)
EOSINOPHIL NFR BLD AUTO: 2 % (ref 0–6)
ERYTHROCYTE [DISTWIDTH] IN BLOOD BY AUTOMATED COUNT: 12.1 % (ref 11.6–15.1)
GFR SERPL CREATININE-BSD FRML MDRD: 87 ML/MIN/1.73SQ M
GLUCOSE SERPL-MCNC: 86 MG/DL (ref 65–140)
HCT VFR BLD AUTO: 44.6 % (ref 36.5–49.3)
HGB BLD-MCNC: 15.9 G/DL (ref 12–17)
IGA SERPL-MCNC: 36 MG/DL (ref 70–400)
IGG SERPL-MCNC: 311 MG/DL (ref 700–1600)
IGM SERPL-MCNC: 75 MG/DL (ref 40–230)
IMM GRANULOCYTES # BLD AUTO: 0.07 THOUSAND/UL (ref 0–0.2)
IMM GRANULOCYTES NFR BLD AUTO: 1 % (ref 0–2)
LYMPHOCYTES # BLD AUTO: 1.85 THOUSANDS/ΜL (ref 0.6–4.47)
LYMPHOCYTES NFR BLD AUTO: 24 % (ref 14–44)
MCH RBC QN AUTO: 31.5 PG (ref 26.8–34.3)
MCHC RBC AUTO-ENTMCNC: 35.7 G/DL (ref 31.4–37.4)
MCV RBC AUTO: 88 FL (ref 82–98)
MONOCYTES # BLD AUTO: 0.78 THOUSAND/ΜL (ref 0.17–1.22)
MONOCYTES NFR BLD AUTO: 10 % (ref 4–12)
NEUTROPHILS # BLD AUTO: 4.77 THOUSANDS/ΜL (ref 1.85–7.62)
NEUTS SEG NFR BLD AUTO: 62 % (ref 43–75)
NRBC BLD AUTO-RTO: 0 /100 WBCS
PLATELET # BLD AUTO: 215 THOUSANDS/UL (ref 149–390)
PMV BLD AUTO: 10.7 FL (ref 8.9–12.7)
POTASSIUM SERPL-SCNC: 4.2 MMOL/L (ref 3.5–5.3)
PROT SERPL-MCNC: 6.6 G/DL (ref 6.4–8.2)
RBC # BLD AUTO: 5.05 MILLION/UL (ref 3.88–5.62)
SODIUM SERPL-SCNC: 138 MMOL/L (ref 136–145)
WBC # BLD AUTO: 7.71 THOUSAND/UL (ref 4.31–10.16)

## 2022-04-27 PROCEDURE — 36415 COLL VENOUS BLD VENIPUNCTURE: CPT

## 2022-04-27 PROCEDURE — 83521 IG LIGHT CHAINS FREE EACH: CPT

## 2022-04-27 PROCEDURE — 82784 ASSAY IGA/IGD/IGG/IGM EACH: CPT

## 2022-04-27 PROCEDURE — 80053 COMPREHEN METABOLIC PANEL: CPT

## 2022-04-27 PROCEDURE — 85025 COMPLETE CBC W/AUTO DIFF WBC: CPT

## 2022-04-28 LAB
KAPPA LC FREE SER-MCNC: 10 MG/L (ref 3.3–19.4)
KAPPA LC FREE/LAMBDA FREE SER: 3.03 {RATIO} (ref 0.26–1.65)
LAMBDA LC FREE SERPL-MCNC: 3.3 MG/L (ref 5.7–26.3)

## 2022-05-05 ENCOUNTER — OFFICE VISIT (OUTPATIENT)
Dept: HEMATOLOGY ONCOLOGY | Facility: CLINIC | Age: 67
End: 2022-05-05
Payer: MEDICARE

## 2022-05-05 VITALS
HEIGHT: 67 IN | OXYGEN SATURATION: 96 % | TEMPERATURE: 96.9 F | WEIGHT: 159 LBS | SYSTOLIC BLOOD PRESSURE: 118 MMHG | HEART RATE: 71 BPM | BODY MASS INDEX: 24.96 KG/M2 | RESPIRATION RATE: 16 BRPM | DIASTOLIC BLOOD PRESSURE: 72 MMHG

## 2022-05-05 DIAGNOSIS — D72.820 MONOCLONAL B-CELL LYMPHOCYTOSIS: ICD-10-CM

## 2022-05-05 DIAGNOSIS — C88.0 WALDENSTROM'S MACROGLOBULINEMIA (HCC): Primary | ICD-10-CM

## 2022-05-05 PROCEDURE — 99213 OFFICE O/P EST LOW 20 MIN: CPT | Performed by: NURSE PRACTITIONER

## 2022-05-05 NOTE — PROGRESS NOTES
[id number] [facility] [address] [address] [phone]  Oncology Progress Note  Frances Varma, 1955, [id number]  5/5/2022        Assessment/Plan:   1  Waldenstrom's macroglobulinemia ([facility] [address] )  2  Monoclonal B-cell lymphocytosis   Patient is a 44-year-old male with a history of Waldenstrom's currently on ibrutinib 280 mg p o  daily  He is tolerating this without difficulty although does report reflux on occasion  Most recent blood work is stable and within his established range, CBC and CMP are within normal limits, quantitative immunoglobulins reveal decreased IgG at 311, IgA 36 0, kappa lambda ratio 3 03 previously 3 45  Overall he is doing well and able to function without restriction caring for his 42-year-old mother  She had been and rehab after falling and breaking hip  She is now home and patient states doing well  We will plan to see him in 3 months with repeat blood work  Patient verbalized understanding and is in agreement with the plan  - CBC and differential; Future  - Comprehensive metabolic panel; Future  - IgG, IgA, IgM; Future  - Immunoglobulin free LT chains blood; Future  - Protein electrophoresis, serum; Future    Goals and Barriers:    Current Goal:   Prolong Survival from Cancer  Barriers: None  Patient's Capacity to Self Care:  Patient is able to self care  [address] [facility]  ______________________________________________________________________________________________________________    Subjective     History of present illness/Cancer History:   Oncology History   [facility]   6/8/2018 Biopsy    Overall, the combination of morphologic and immunophenotypic features is consistent with 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 with small cell size and overall low grade appearance   The morphologic features and nonspecific immunophenotype are most consistent with marginal zone lymphoma or lymphoplasmacytic lymphoma  The presence of a monoclonal IgM expressing plasma cell population with expression of the same light chain as the malignant B-cell population in the setting of IgM monoclonal paraprotein may favor lymphoplasmacytic lymphoma, though is not definitively specific  MYD 88 +      2018 Initial Diagnosis    Waldenstrom's macroglobulinemia [facility])  2018 patient had hernia repair  Leukocytosis was noted  Peripheral blood flow cytometry showed findings consistent with monoclonal B-cell lymphocytosis  Additionally, he was found to have a kappa lambda ratio of 36 0  IgM 3700 with reciprocal inhibition  Skeletal survey showed no lytic bone foci  2018 -  Chemotherapy    Ibrutinib 280 mg p o  daily  Lab Results   Component Value Date    WBC 7 71 2022    HGB 15 9 2022    HCT 44 6 2022    MCV 88 2022     2022     Lab Results   Component Value Date    SODIUM 138 2022    K 4 2 2022     2022    CO2 29 2022    AGAP 3 (L) 2022    BUN 11 2022    CREATININE 0 92 2022    GLUC 86 2022    GLUF 141 (H) 2020    CALCIUM 9 2 2022    AST 20 2022    ALT 30 2022    ALKPHOS 78 2022    TP 6 6 2022    TBILI 0 72 2022    EGFR 87 2022       Interval history:  Clinically stable     ECO - Symptomatic but completely ambulatory    Review of Systems   Constitutional: Negative for activity change, appetite change, fatigue, fever and unexpected weight change  Respiratory: Negative for cough and shortness of breath  Cardiovascular: Negative for chest pain and leg swelling  Gastrointestinal: Negative for abdominal pain, constipation, diarrhea and nausea  Reflux   Endocrine: Negative for cold intolerance and heat intolerance     Musculoskeletal: Negative for arthralgias and myalgias  Skin: Negative  Neurological: Negative for dizziness, weakness and headaches  Hematological: Negative for adenopathy  Does not bruise/bleed easily  Current Outpatient Medications:     Acetaminophen (TYLENOL) 325 MG CAPS, Take by mouth as needed , Disp: , Rfl:     Imbruvica 140 MG CAPS, Take 2 capsules (280 mg total) by mouth once each day , Disp: 60 capsule, Rfl: 10    Multiple Vitamin (MULTI VITAMIN MENS PO), Take 1 tablet by mouth daily, Disp: , Rfl:   No Known Allergies      Objective   /72   Pulse 71   Temp (!) 96 9 °F (36 1 °C)   Resp 16   Ht 5' 7" (1 702 m)   Wt 72 1 kg (159 lb)   SpO2 96%   BMI 24 90 kg/m²   Wt Readings from Last 6 Encounters:   05/05/22 72 1 kg (159 lb)   01/05/22 69 9 kg (154 lb)   10/06/21 69 4 kg (153 lb)   07/08/21 68 5 kg (151 lb)   03/23/21 67 6 kg (149 lb)   03/18/21 67 6 kg (149 lb)       Physical Exam  Constitutional:       Appearance: Normal appearance  He is well-developed  HENT:      Head: Normocephalic and atraumatic  Eyes:      Pupils: Pupils are equal, round, and reactive to light  Cardiovascular:      Rate and Rhythm: Normal rate and regular rhythm  Pulses: Normal pulses  Heart sounds: Normal heart sounds  Pulmonary:      Effort: Pulmonary effort is normal  No respiratory distress  Breath sounds: Normal breath sounds  Abdominal:      General: Bowel sounds are normal       Palpations: Abdomen is soft  Musculoskeletal:         General: Normal range of motion  Cervical back: Normal range of motion  Lymphadenopathy:      Cervical: No cervical adenopathy  Skin:     General: Skin is warm and dry  Capillary Refill: Capillary refill takes less than 2 seconds  Neurological:      Mental Status: He is alert and oriented to person, place, and time     Psychiatric:         Behavior: Behavior normal        The following historical data was reviewed:  Past Medical History:   Diagnosis Date    Anemia, unspecified 4/30/2018    COVID-19 03/17/2021    Hyperglycemia     Last Assessed:1/18/18    Respiratory tract infection due to COVID-19 virus 3/18/2021    Waldenstrom's macroglobulinemia ([facility] ) 6/21/2018     Please note: This report has been generated by a voice recognition software system  Therefore there may be syntax, spelling, and/or grammatical errors  Please call if you have any questions

## 2022-06-21 ENCOUNTER — OFFICE VISIT (OUTPATIENT)
Dept: INTERNAL MEDICINE CLINIC | Facility: CLINIC | Age: 67
End: 2022-06-21
Payer: MEDICARE

## 2022-06-21 ENCOUNTER — TELEPHONE (OUTPATIENT)
Dept: ADMINISTRATIVE | Facility: OTHER | Age: 67
End: 2022-06-21

## 2022-06-21 VITALS
OXYGEN SATURATION: 98 % | TEMPERATURE: 98.5 F | DIASTOLIC BLOOD PRESSURE: 72 MMHG | WEIGHT: 154 LBS | BODY MASS INDEX: 24.17 KG/M2 | HEART RATE: 75 BPM | SYSTOLIC BLOOD PRESSURE: 110 MMHG | HEIGHT: 67 IN

## 2022-06-21 DIAGNOSIS — Z00.00 ENCOUNTER FOR ANNUAL PHYSICAL EXAM: ICD-10-CM

## 2022-06-21 DIAGNOSIS — Z12.11 ENCOUNTER FOR SCREENING FOR MALIGNANT NEOPLASM OF COLON: Primary | ICD-10-CM

## 2022-06-21 DIAGNOSIS — Z00.00 ANNUAL PHYSICAL EXAM: ICD-10-CM

## 2022-06-21 DIAGNOSIS — C88.0 WALDENSTROM'S MACROGLOBULINEMIA (HCC): ICD-10-CM

## 2022-06-21 PROBLEM — U07.1 RESPIRATORY TRACT INFECTION DUE TO COVID-19 VIRUS: Status: RESOLVED | Noted: 2021-03-18 | Resolved: 2022-06-21

## 2022-06-21 PROBLEM — K40.20 INGUINAL HERNIA BILATERAL, NON-RECURRENT: Status: RESOLVED | Noted: 2018-02-19 | Resolved: 2022-06-21

## 2022-06-21 PROBLEM — K40.90 UNILATERAL INGUINAL HERNIA WITHOUT OBSTRUCTION OR GANGRENE: Status: RESOLVED | Noted: 2018-01-18 | Resolved: 2022-06-21

## 2022-06-21 PROBLEM — Z11.59 NEED FOR HEPATITIS C SCREENING TEST: Status: ACTIVE | Noted: 2022-06-21

## 2022-06-21 PROBLEM — J98.8 RESPIRATORY TRACT INFECTION DUE TO COVID-19 VIRUS: Status: RESOLVED | Noted: 2021-03-18 | Resolved: 2022-06-21

## 2022-06-21 PROBLEM — R07.81 RIB PAIN ON LEFT SIDE: Status: RESOLVED | Noted: 2018-05-01 | Resolved: 2022-06-21

## 2022-06-21 PROCEDURE — 99396 PREV VISIT EST AGE 40-64: CPT | Performed by: INTERNAL MEDICINE

## 2022-06-21 NOTE — LETTER
[facility] Medical Records Request for Care Gap Closure    Medical Records Fax: [phone]    Date Requested: 22  Patient: Radha Garcia  Patient : 1955   Requesting on behalf of Provider: MD Abraham Cat MD has requested the retrieval and updating of CRC: Colonoscopy  The office staff has provided us with the following information listed below  Prior to sending this request I have reviewed Epic Chart Review, completed an Epic Chart Search, and reviewed Care Everywhere documents  Estimated Date of Service:  18 - THERE IS A EGD  IN THE CHART MARKED AS A COLON  [facility]  Specialty:[facility]O  Performing Provider: DR Karen ARROYO  Additional Comments:      Your assistance in completing this request is greatly appreciated  Please send document to [phone] christiano Christian Bachelor: Phone [phone]       Sincerely,      Chuck Piña, [address]

## 2022-06-21 NOTE — PATIENT INSTRUCTIONS

## 2022-06-21 NOTE — PROGRESS NOTES
Assessment/Plan:    Encounter for screening for malignant neoplasm of colon  Cologuard ordered       Diagnoses and all orders for this visit:    Encounter for screening for malignant neoplasm of colon  -     Cologuard    Waldenstrom's macroglobulinemia ([facility] [address] )          Subjective:      Patient ID: Jermaine Otoole is a 61 y o  male  Patient is seen for follow-up visit  He has history of Waldenstrom's macroglobulinemia and is currently on ibrutinib 280 mg p o  daily  He has been tolerating this without difficulty  Most recent blood work is stable demonstrates a normal CBC and his CMP is also within  normal limits, quantitative immunoglobulins reveal decreased IgG at 311, IgA 36 0, IgM is normal at 75 0  IgG kappa light chains are normal at 10 and IgG lambda light chains are low at 3 3 with a ratio of 3 03, previously 3 45  Overall, he appears to be doing quite well and is able to function without restrictions as he cares for his 80-year-old mother who is recovering at home from hip fracture         Family History   Problem Relation Age of Onset    Parkinsonism Mother     Thyroid disease Mother     Diabetes Father     Other Father         Heart Artery Stent     Social History     Socioeconomic History    Marital status: Single     Spouse name: Not on file    Number of children: Not on file    Years of education: Not on file    Highest education level: Not on file   Occupational History    Not on file   Tobacco Use    Smoking status: Never Smoker    Smokeless tobacco: Never Used   Vaping Use    Vaping Use: Never used   Substance and Sexual Activity    Alcohol use: No    Drug use: No    Sexual activity: Not on file   Other Topics Concern    Not on file   Social History Narrative    Caffeine use     Social Determinants of Health     Financial Resource Strain: Not on file   Food Insecurity: Not on file   Transportation Needs: Not on file   Physical Activity: Not on file   Stress: Not on file   Social Connections: Not on file   Intimate Partner Violence: Not on file   Housing Stability: Not on file     Past Medical History:   Diagnosis Date    Anemia, unspecified 4/30/2018    COVID-19 03/17/2021    Hyperglycemia     Last Assessed:1/18/18    Inguinal hernia bilateral, non-recurrent 2/19/2018    Respiratory tract infection due to COVID-19 virus 3/18/2021    Respiratory tract infection due to COVID-19 virus 3/18/2021    Rib pain on left side 5/1/2018    Unilateral inguinal hernia without obstruction or gangrene 1/18/2018    Waldenstrom's macroglobulinemia ([facility] [address] ) 6/21/2018       Current Outpatient Medications:     Acetaminophen 325 MG CAPS, Take by mouth as needed , Disp: , Rfl:     Imbruvica 140 MG CAPS, Take 2 capsules (280 mg total) by mouth once each day , Disp: 60 capsule, Rfl: 10    Multiple Vitamin (MULTI VITAMIN MENS PO), Take 1 tablet by mouth daily, Disp: , Rfl:   No Known Allergies  Past Surgical History:   Procedure Laterality Date    WISDOM TOOTH EXTRACTION      Last Assessed:1/18/18         Review of Systems   Constitutional: Negative  Negative for activity change, appetite change, chills, diaphoresis, fatigue, fever and unexpected weight change  HENT: Negative  Eyes: Negative  Respiratory: Negative  Cardiovascular: Negative  Gastrointestinal: Negative  Endocrine: Negative  Genitourinary: Negative  Musculoskeletal: Negative  Skin: Negative  Neurological: Negative  Hematological: Negative  Psychiatric/Behavioral: The patient is not nervous/anxious  Objective:      /72 (BP Location: Left arm, Patient Position: Sitting, Cuff Size: Standard)   Pulse 75   Temp 98 5 °F (36 9 °C) (Tympanic)   Ht 5' 7 21" (1 707 m)   Wt 69 9 kg (154 lb)   SpO2 98%   BMI 23 97 kg/m²          Physical Exam  Vitals reviewed  Constitutional:       General: He is not in acute distress  Appearance: Normal appearance  He is normal weight   He is not ill-appearing, toxic-appearing or diaphoretic  HENT:      Head: Normocephalic and atraumatic  Right Ear: External ear normal       Left Ear: External ear normal       Nose: Nose normal    Eyes:      General: No scleral icterus  Conjunctiva/sclera: Conjunctivae normal       Pupils: Pupils are equal, round, and reactive to light  Neck:      Vascular: No carotid bruit or JVD  Trachea: No tracheal deviation  Cardiovascular:      Rate and Rhythm: Normal rate and regular rhythm  Pulses: Normal pulses  Heart sounds: Normal heart sounds  No murmur heard  Pulmonary:      Effort: Pulmonary effort is normal  No respiratory distress  Breath sounds: Normal breath sounds  No rales  Abdominal:      General: Abdomen is flat  There is no distension  Musculoskeletal:         General: No swelling  Cervical back: Neck supple  Right lower leg: No edema  Left lower leg: No edema  Skin:     General: Skin is warm  Coloration: Skin is not jaundiced  Findings: No bruising, erythema or rash  Neurological:      General: No focal deficit present  Mental Status: He is alert and oriented to person, place, and time  Mental status is at baseline     Psychiatric:         Mood and Affect: Mood normal          Behavior: Behavior normal

## 2022-06-21 NOTE — TELEPHONE ENCOUNTER
----- Message from Rogerio Villalobos sent at 6/21/2022 11:23 AM EDT -----  Regarding: colonoscopy - [facility]  06/21/22 11:24 AM    Hello, our patient Kaylie Christianson has had CRC: Colonoscopy completed/performed  Please assist in updating the patient chart by making an External outreach to Dr Wilmer Felipe at [facility] facility located in [facility]  The date of service is 05/18/2018      Thank you,  Rogerio Villalobos  [facility]

## 2022-06-21 NOTE — PROGRESS NOTES
ADULT ANNUAL [address] [facility] PRIMARY CARE [place]    NAME: Jhoana Canela  AGE: 61 y o  SEX: male  : 1955     DATE: 2022     Assessment and Plan:     Problem List Items Addressed This Visit        Other    Waldenstrom's macroglobulinemia ([facility] [address] )    Encounter for screening for malignant neoplasm of colon - Primary     Cologuard ordered           Relevant Orders    Cologuard          Immunizations and preventive care screenings were discussed with patient today  Appropriate education was printed on patient's after visit summary  Counseling:  · Alcohol/drug use: discussed moderation in alcohol intake, the recommendations for healthy alcohol use, and avoidance of illicit drug use  No follow-ups on file  Chief Complaint:     Chief Complaint   Patient presents with    Physical Exam     He had a colonoscopy done at [facility] 2018 by Dr Carlos Plunkett  Request sent to [facility]  History of Present Illness:     Adult Annual Physical   Patient here for a comprehensive physical exam  The patient reports no problems  He has history of Waldenstrom's macroglobulinemia and is currently on ibrutinib 280 mg p o  daily  He has been tolerating this without difficulty  Most recent blood work is stable demonstrates a normal CBC and his CMP is also within  normal limits, quantitative immunoglobulins reveal decreased IgG at 311, IgA 36 0, IgM is normal at 75 0  IgG kappa light chains are normal at 10 and IgG lambda light chains are low at 3 3 with a ratio of 3 03, previously 3 45  Overall, he appears to be doing quite well and is able to function without restrictions as he cares for his 80-year-old mother who is recovering at home from hip fracture  Diet and Physical Activity  · Diet/Nutrition: well balanced diet  · Exercise: walking        Depression Screening  PHQ-2/9 Depression Screening    Little interest or pleasure in doing things: 0 - not at all  Feeling down, depressed, or hopeless: 0 - not at all  PHQ-2 Score: 0  PHQ-2 Interpretation: Negative depression screen       General Health  · Sleep: gets 7-8 hours of sleep on average  · Hearing: normal - bilateral   · Vision: no vision problems  · Dental: regular dental visits   Health  · Symptoms include: none     Review of Systems:     Review of Systems   Constitutional: Negative  Negative for activity change, appetite change, chills, diaphoresis, fatigue, fever and unexpected weight change  HENT: Negative  Eyes: Negative  Respiratory: Negative  Cardiovascular: Negative  Gastrointestinal: Negative  Endocrine: Negative  Genitourinary: Negative  Musculoskeletal: Negative  Skin: Negative  Neurological: Negative  Hematological: Negative  Psychiatric/Behavioral: The patient is not nervous/anxious         Past Medical History:     Past Medical History:   Diagnosis Date    Anemia, unspecified 4/30/2018    COVID-19 03/17/2021    Hyperglycemia     Last Assessed:1/18/18    Inguinal hernia bilateral, non-recurrent 2/19/2018    Respiratory tract infection due to COVID-19 virus 3/18/2021    Respiratory tract infection due to COVID-19 virus 3/18/2021    Rib pain on left side 5/1/2018    Unilateral inguinal hernia without obstruction or gangrene 1/18/2018    Waldenstrom's macroglobulinemia ([facility] [address] ) 6/21/2018      Past Surgical History:     Past Surgical History:   Procedure Laterality Date    WISDOM TOOTH EXTRACTION      Last Assessed:1/18/18      Family History:     Family History   Problem Relation Age of Onset    Parkinsonism Mother     Thyroid disease Mother     Diabetes Father     Other Father         Heart Artery Stent      Social History:     Social History     Socioeconomic History    Marital status: Single     Spouse name: None    Number of children: None    Years of education: None    Highest education level: None Occupational History    None   Tobacco Use    Smoking status: Never Smoker    Smokeless tobacco: Never Used   Vaping Use    Vaping Use: Never used   Substance and Sexual Activity    Alcohol use: No    Drug use: No    Sexual activity: None   Other Topics Concern    None   Social History Narrative    Caffeine use     Social Determinants of Health     Financial Resource Strain: Not on file   Food Insecurity: Not on file   Transportation Needs: Not on file   Physical Activity: Not on file   Stress: Not on file   Social Connections: Not on file   Intimate Partner Violence: Not on file   Housing Stability: Not on file      Current Medications:     Current Outpatient Medications   Medication Sig Dispense Refill    Acetaminophen 325 MG CAPS Take by mouth as needed       Imbruvica 140 MG CAPS Take 2 capsules (280 mg total) by mouth once each day  60 capsule 10    Multiple Vitamin (MULTI VITAMIN MENS PO) Take 1 tablet by mouth daily       No current facility-administered medications for this visit  Allergies:     No Known Allergies   Physical Exam:     /72 (BP Location: Left arm, Patient Position: Sitting, Cuff Size: Standard)   Pulse 75   Temp 98 5 °F (36 9 °C) (Tympanic)   Ht 5' 7 21" (1 707 m)   Wt 69 9 kg (154 lb)   SpO2 98%   BMI 23 97 kg/m²     Physical Exam  Vitals reviewed  Constitutional:       General: He is not in acute distress  Appearance: Normal appearance  He is well-developed and normal weight  He is not ill-appearing, toxic-appearing or diaphoretic  HENT:      Head: Normocephalic and atraumatic  Right Ear: Tympanic membrane normal       Left Ear: Tympanic membrane normal       Nose: Nose normal    Eyes:      General: No scleral icterus  Conjunctiva/sclera: Conjunctivae normal       Pupils: Pupils are equal, round, and reactive to light  Neck:      Vascular: No JVD  Trachea: No tracheal deviation     Cardiovascular:      Rate and Rhythm: Normal rate and regular rhythm  Heart sounds: No murmur heard  Pulmonary:      Effort: Pulmonary effort is normal  No respiratory distress  Breath sounds: Normal breath sounds  Abdominal:      General: Abdomen is flat  There is no distension  Palpations: Abdomen is soft  Tenderness: There is no abdominal tenderness  Musculoskeletal:         General: No swelling  Cervical back: Neck supple  Right lower leg: No edema  Left lower leg: No edema  Skin:     General: Skin is warm  Coloration: Skin is not jaundiced  Findings: No bruising, erythema or rash  Neurological:      General: No focal deficit present  Mental Status: He is alert and oriented to person, place, and time  Mental status is at baseline     Psychiatric:         Mood and Affect: Mood normal          Behavior: Behavior normal           Etelvina Putnam MD    [facility] [place]

## 2022-06-21 NOTE — ASSESSMENT & PLAN NOTE
Patient is doing remarkably well and his treatment for Waldenstrom's macroglobulinemia    Follow-up per hematology

## 2022-06-21 NOTE — TELEPHONE ENCOUNTER
Upon review of the In Basket request we were able to locate, review, and update the patient chart as requested for CRC: Colonoscopy  Any additional questions or concerns should be emailed to the Practice Liaisons via [email or web address] email, please do not reply via In Basket      Thank you  Melchor Dutta MA

## 2022-06-21 NOTE — TELEPHONE ENCOUNTER
Upon review of the In Basket request and the patient's chart, initial outreach has been made via fax, please see Contacts section for details       Thank you  Jenise Feng MA

## 2022-07-06 LAB — COLOGUARD RESULT REPORTABLE: NEGATIVE

## 2022-07-27 DIAGNOSIS — C88.0 WALDENSTROM'S MACROGLOBULINEMIA (HCC): ICD-10-CM

## 2022-07-27 RX ORDER — IBRUTINIB 140 MG/1
CAPSULE ORAL
Qty: 60 CAPSULE | Refills: 10 | Status: SHIPPED | OUTPATIENT
Start: 2022-07-27 | End: 2022-08-16

## 2022-08-01 ENCOUNTER — APPOINTMENT (OUTPATIENT)
Dept: LAB | Facility: CLINIC | Age: 67
End: 2022-08-01
Payer: MEDICARE

## 2022-08-01 DIAGNOSIS — C88.0 WALDENSTROM'S MACROGLOBULINEMIA (HCC): ICD-10-CM

## 2022-08-01 DIAGNOSIS — D72.820 MONOCLONAL B-CELL LYMPHOCYTOSIS: ICD-10-CM

## 2022-08-01 LAB
ALBUMIN SERPL BCP-MCNC: 4 G/DL (ref 3.5–5)
ALP SERPL-CCNC: 73 U/L (ref 46–116)
ALT SERPL W P-5'-P-CCNC: 31 U/L (ref 12–78)
ANION GAP SERPL CALCULATED.3IONS-SCNC: 2 MMOL/L (ref 4–13)
AST SERPL W P-5'-P-CCNC: 21 U/L (ref 5–45)
BASOPHILS # BLD AUTO: 0.1 THOUSANDS/ΜL (ref 0–0.1)
BASOPHILS NFR BLD AUTO: 1 % (ref 0–1)
BILIRUB SERPL-MCNC: 0.9 MG/DL (ref 0.2–1)
BUN SERPL-MCNC: 10 MG/DL (ref 5–25)
CALCIUM SERPL-MCNC: 9.1 MG/DL (ref 8.3–10.1)
CHLORIDE SERPL-SCNC: 105 MMOL/L (ref 96–108)
CO2 SERPL-SCNC: 29 MMOL/L (ref 21–32)
CREAT SERPL-MCNC: 0.94 MG/DL (ref 0.6–1.3)
EOSINOPHIL # BLD AUTO: 0.14 THOUSAND/ΜL (ref 0–0.61)
EOSINOPHIL NFR BLD AUTO: 2 % (ref 0–6)
ERYTHROCYTE [DISTWIDTH] IN BLOOD BY AUTOMATED COUNT: 12.2 % (ref 11.6–15.1)
GFR SERPL CREATININE-BSD FRML MDRD: 85 ML/MIN/1.73SQ M
GLUCOSE SERPL-MCNC: 93 MG/DL (ref 65–140)
HCT VFR BLD AUTO: 45.4 % (ref 36.5–49.3)
HGB BLD-MCNC: 15.7 G/DL (ref 12–17)
IMM GRANULOCYTES # BLD AUTO: 0.11 THOUSAND/UL (ref 0–0.2)
IMM GRANULOCYTES NFR BLD AUTO: 1 % (ref 0–2)
LYMPHOCYTES # BLD AUTO: 1.88 THOUSANDS/ΜL (ref 0.6–4.47)
LYMPHOCYTES NFR BLD AUTO: 24 % (ref 14–44)
MCH RBC QN AUTO: 31.2 PG (ref 26.8–34.3)
MCHC RBC AUTO-ENTMCNC: 34.6 G/DL (ref 31.4–37.4)
MCV RBC AUTO: 90 FL (ref 82–98)
MONOCYTES # BLD AUTO: 0.65 THOUSAND/ΜL (ref 0.17–1.22)
MONOCYTES NFR BLD AUTO: 8 % (ref 4–12)
NEUTROPHILS # BLD AUTO: 4.98 THOUSANDS/ΜL (ref 1.85–7.62)
NEUTS SEG NFR BLD AUTO: 64 % (ref 43–75)
NRBC BLD AUTO-RTO: 0 /100 WBCS
PLATELET # BLD AUTO: 230 THOUSANDS/UL (ref 149–390)
PMV BLD AUTO: 11 FL (ref 8.9–12.7)
POTASSIUM SERPL-SCNC: 4.6 MMOL/L (ref 3.5–5.3)
PROT SERPL-MCNC: 6.7 G/DL (ref 6.4–8.4)
RBC # BLD AUTO: 5.04 MILLION/UL (ref 3.88–5.62)
SODIUM SERPL-SCNC: 136 MMOL/L (ref 135–147)
WBC # BLD AUTO: 7.86 THOUSAND/UL (ref 4.31–10.16)

## 2022-08-01 PROCEDURE — 85025 COMPLETE CBC W/AUTO DIFF WBC: CPT

## 2022-08-01 PROCEDURE — 86334 IMMUNOFIX E-PHORESIS SERUM: CPT

## 2022-08-01 PROCEDURE — 84165 PROTEIN E-PHORESIS SERUM: CPT | Performed by: PATHOLOGY

## 2022-08-01 PROCEDURE — 83521 IG LIGHT CHAINS FREE EACH: CPT

## 2022-08-01 PROCEDURE — 80053 COMPREHEN METABOLIC PANEL: CPT

## 2022-08-01 PROCEDURE — 86334 IMMUNOFIX E-PHORESIS SERUM: CPT | Performed by: PATHOLOGY

## 2022-08-01 PROCEDURE — 82784 ASSAY IGA/IGD/IGG/IGM EACH: CPT

## 2022-08-01 PROCEDURE — 36415 COLL VENOUS BLD VENIPUNCTURE: CPT

## 2022-08-01 PROCEDURE — 84165 PROTEIN E-PHORESIS SERUM: CPT

## 2022-08-02 LAB
IGA SERPL-MCNC: 40 MG/DL (ref 70–400)
IGG SERPL-MCNC: 257 MG/DL (ref 700–1600)
IGM SERPL-MCNC: 75 MG/DL (ref 40–230)
KAPPA LC FREE SER-MCNC: 10.4 MG/L (ref 3.3–19.4)
KAPPA LC FREE/LAMBDA FREE SER: 3.15 {RATIO} (ref 0.26–1.65)
LAMBDA LC FREE SERPL-MCNC: 3.3 MG/L (ref 5.7–26.3)

## 2022-08-03 LAB
ALBUMIN SERPL ELPH-MCNC: 4.73 G/DL (ref 3.5–5)
ALBUMIN SERPL ELPH-MCNC: 73.9 % (ref 52–65)
ALPHA1 GLOB SERPL ELPH-MCNC: 0.26 G/DL (ref 0.1–0.4)
ALPHA1 GLOB SERPL ELPH-MCNC: 4.1 % (ref 2.5–5)
ALPHA2 GLOB SERPL ELPH-MCNC: 0.57 G/DL (ref 0.4–1.2)
ALPHA2 GLOB SERPL ELPH-MCNC: 8.9 % (ref 7–13)
BETA GLOB ABNORMAL SERPL ELPH-MCNC: 0.36 G/DL (ref 0.4–0.8)
BETA1 GLOB SERPL ELPH-MCNC: 5.6 % (ref 5–13)
BETA2 GLOB SERPL ELPH-MCNC: 3.4 % (ref 2–8)
BETA2+GAMMA GLOB SERPL ELPH-MCNC: 0.22 G/DL (ref 0.2–0.5)
GAMMA GLOB ABNORMAL SERPL ELPH-MCNC: 0.26 G/DL (ref 0.5–1.6)
GAMMA GLOB SERPL ELPH-MCNC: 4.1 % (ref 12–22)
IGG/ALB SER: 2.83 {RATIO} (ref 1.1–1.8)
INTERPRETATION UR IFE-IMP: NORMAL
M PROTEIN 1 MFR SERPL ELPH: 1.1 %
M PROTEIN 1 SERPL ELPH-MCNC: 0.07 G/DL
PROT PATTERN SERPL ELPH-IMP: ABNORMAL
PROT SERPL-MCNC: 6.4 G/DL (ref 6.4–8.2)

## 2022-08-04 ENCOUNTER — OFFICE VISIT (OUTPATIENT)
Dept: HEMATOLOGY ONCOLOGY | Facility: CLINIC | Age: 67
End: 2022-08-04
Payer: MEDICARE

## 2022-08-04 VITALS
RESPIRATION RATE: 18 BRPM | WEIGHT: 149.6 LBS | HEIGHT: 67 IN | HEART RATE: 91 BPM | OXYGEN SATURATION: 97 % | SYSTOLIC BLOOD PRESSURE: 120 MMHG | DIASTOLIC BLOOD PRESSURE: 80 MMHG | TEMPERATURE: 98.7 F | BODY MASS INDEX: 23.48 KG/M2

## 2022-08-04 DIAGNOSIS — C88.0 WALDENSTROM'S MACROGLOBULINEMIA (HCC): Primary | ICD-10-CM

## 2022-08-04 PROCEDURE — 99213 OFFICE O/P EST LOW 20 MIN: CPT | Performed by: NURSE PRACTITIONER

## 2022-08-04 NOTE — PROGRESS NOTES
[address] [facility] [address] [address] [phone]  Oncology Progress Note  John Wilkins, 1955, [id number]  8/4/2022        Assessment/Plan:   1  Waldenstrom's macroglobulinemia [facility])   Patient is a 73-year-old male with a history of Waldenstrom's macroglobulinemia currently on ibrutinib 280 mg p o  once daily  Overall he tolerates this without difficulty  Most recent CBC CMP  within normal limits, quantitative immunoglobulins are within his established range, IgA 40 0, IgG 257 normal IgM  Kappa lambda ratio 3 15 within his established range  SPEP shows monoclonal peak 0 07, previously identified as IgM kappa  He is able to function without restriction using sunscreen and skin protection 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  We will plan to see him in 3 months with repeat blood work, patient verbalized understanding is in agreement with the plan  - CBC and differential; Future  - Comprehensive metabolic panel; Future  - IgG, IgA, IgM; Future  - Immunoglobulin free LT chains blood; Future  - Protein electrophoresis, serum; Future    Goals and Barriers:    Current Goal:   Prolong Survival from Cancer  Barriers: None  Patient's Capacity to Self Care:  Patient is able to self care  [address]   ______________________________________________________________________________________________________________    Subjective     History of present illness/Cancer History:   Oncology History   Waldenstrom's macroglobulinemia ([facility] [address] )   6/8/2018 Biopsy    Overall, the combination of morphologic and immunophenotypic features is consistent with 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 with small cell size and overall low grade appearance   The morphologic features and nonspecific immunophenotype are most consistent with marginal zone lymphoma or lymphoplasmacytic lymphoma  The presence of a monoclonal IgM expressing plasma cell population with expression of the same light chain as the malignant B-cell population in the setting of IgM monoclonal paraprotein may favor lymphoplasmacytic lymphoma, though is not definitively specific  MYD 88 +      2018 Initial Diagnosis    Waldenstrom's macroglobulinemia [facility])  2018 patient had hernia repair  Leukocytosis was noted  Peripheral blood flow cytometry showed findings consistent with monoclonal B-cell lymphocytosis  Additionally, he was found to have a kappa lambda ratio of 36 0  IgM 3700 with reciprocal inhibition  Skeletal survey showed no lytic bone foci  2018 -  Chemotherapy    Ibrutinib 280 mg p o  daily  Lab Results   Component Value Date    WBC 7 86 2022    HGB 15 7 2022    HCT 45 4 2022    MCV 90 2022     2022     Lab Results   Component Value Date    SODIUM 136 2022    K 4 6 2022     2022    CO2 29 2022    AGAP 2 (L) 2022    BUN 10 2022    CREATININE 0 94 2022    GLUC 93 2022    GLUF 141 (H) 2020    CALCIUM 9 1 2022    AST 21 2022    ALT 31 2022    ALKPHOS 73 2022    TP 6 7 2022    TP 6 4 2022    TBILI 0 90 2022    EGFR 85 2022     Interval history:  Clinically stable     ECO - Asymptomatic    Review of Systems   Constitutional: Negative for activity change, appetite change, fatigue, fever and unexpected weight change  Respiratory: Negative for cough and shortness of breath  Cardiovascular: Negative for chest pain and leg swelling  Gastrointestinal: Negative for abdominal pain, constipation, diarrhea and nausea  Endocrine: Negative for cold intolerance and heat intolerance  Musculoskeletal: Negative for arthralgias and myalgias  Skin: Negative  Neurological: Negative for dizziness, weakness and headaches  Hematological: Negative for adenopathy  Does not bruise/bleed easily  Current Outpatient Medications:     Acetaminophen 325 MG CAPS, Take by mouth as needed , Disp: , Rfl:     Imbruvica 140 MG CAPS, Take 2 capsules (280 mg total) by mouth once each day , Disp: 60 capsule, Rfl: 10    Multiple Vitamin (MULTI VITAMIN MENS PO), Take 1 tablet by mouth daily, Disp: , Rfl:   No Known Allergies    Objective   /80 (BP Location: Left arm, Patient Position: Sitting, Cuff Size: Adult)   Pulse 91   Temp 98 7 °F (37 1 °C) (Tympanic)   Resp 18   Ht 5' 7" (1 702 m)   Wt 67 9 kg (149 lb 9 6 oz)   SpO2 97%   BMI 23 43 kg/m²   Wt Readings from Last 6 Encounters:   08/04/22 67 9 kg (149 lb 9 6 oz)   06/21/22 69 9 kg (154 lb)   05/05/22 72 1 kg (159 lb)   01/05/22 69 9 kg (154 lb)   10/06/21 69 4 kg (153 lb)   07/08/21 68 5 kg (151 lb)     Physical Exam  Vitals reviewed  Constitutional:       Appearance: Normal appearance  He is well-developed  HENT:      Head: Normocephalic and atraumatic  Eyes:      Pupils: Pupils are equal, round, and reactive to light  Pulmonary:      Effort: Pulmonary effort is normal  No respiratory distress  Musculoskeletal:         General: Normal range of motion  Cervical back: Normal range of motion  Lymphadenopathy:      Cervical: No cervical adenopathy  Skin:     General: Skin is dry  Neurological:      Mental Status: He is alert and oriented to person, place, and time     Psychiatric:         Behavior: Behavior normal      The following historical data was reviewed:  Past Medical History:   Diagnosis Date    Anemia, unspecified 4/30/2018    COVID-19 03/17/2021    Hyperglycemia     Last Assessed:1/18/18    Inguinal hernia bilateral, non-recurrent 2/19/2018    Respiratory tract infection due to COVID-19 virus 3/18/2021    Respiratory tract infection due to COVID-19 virus 3/18/2021    Rib pain on left side 5/1/2018    Unilateral inguinal hernia without obstruction or gangrene 1/18/2018    Waldenstrom's macroglobulinemia ([facility] [address] ) 6/21/2018     Past Surgical History:   Procedure Laterality Date    WISDOM TOOTH EXTRACTION      Last Assessed:1/18/18     Please note: This report has been generated by a voice recognition software system  Therefore there may be syntax, spelling, and/or grammatical errors  Please call if you have any questions

## 2022-08-15 DIAGNOSIS — C88.0 WALDENSTROM'S MACROGLOBULINEMIA (HCC): ICD-10-CM

## 2022-08-16 RX ORDER — IBRUTINIB 140 MG/1
CAPSULE ORAL
Qty: 60 CAPSULE | Refills: 10 | Status: SHIPPED | OUTPATIENT
Start: 2022-08-16

## 2022-10-11 PROBLEM — Z12.11 ENCOUNTER FOR SCREENING FOR MALIGNANT NEOPLASM OF COLON: Status: RESOLVED | Noted: 2022-06-21 | Resolved: 2022-10-11

## 2022-10-11 PROBLEM — Z11.59 NEED FOR HEPATITIS C SCREENING TEST: Status: RESOLVED | Noted: 2022-06-21 | Resolved: 2022-10-11

## 2022-10-31 ENCOUNTER — IMMUNIZATIONS (OUTPATIENT)
Dept: INTERNAL MEDICINE CLINIC | Facility: CLINIC | Age: 67
End: 2022-10-31

## 2022-10-31 DIAGNOSIS — Z23 NEED FOR INFLUENZA VACCINATION: Primary | ICD-10-CM

## 2022-11-01 ENCOUNTER — APPOINTMENT (OUTPATIENT)
Dept: LAB | Facility: CLINIC | Age: 67
End: 2022-11-01

## 2022-11-01 DIAGNOSIS — C88.0 WALDENSTROM'S MACROGLOBULINEMIA (HCC): ICD-10-CM

## 2022-11-01 LAB
ALBUMIN SERPL BCP-MCNC: 4.1 G/DL (ref 3.5–5)
ALP SERPL-CCNC: 78 U/L (ref 46–116)
ALT SERPL W P-5'-P-CCNC: 29 U/L (ref 12–78)
ANION GAP SERPL CALCULATED.3IONS-SCNC: 4 MMOL/L (ref 4–13)
AST SERPL W P-5'-P-CCNC: 16 U/L (ref 5–45)
BASOPHILS # BLD AUTO: 0.09 THOUSANDS/ÂΜL (ref 0–0.1)
BASOPHILS NFR BLD AUTO: 1 % (ref 0–1)
BILIRUB SERPL-MCNC: 0.94 MG/DL (ref 0.2–1)
BUN SERPL-MCNC: 10 MG/DL (ref 5–25)
CALCIUM SERPL-MCNC: 9.3 MG/DL (ref 8.3–10.1)
CHLORIDE SERPL-SCNC: 106 MMOL/L (ref 96–108)
CO2 SERPL-SCNC: 28 MMOL/L (ref 21–32)
CREAT SERPL-MCNC: 0.94 MG/DL (ref 0.6–1.3)
EOSINOPHIL # BLD AUTO: 0.19 THOUSAND/ÂΜL (ref 0–0.61)
EOSINOPHIL NFR BLD AUTO: 3 % (ref 0–6)
ERYTHROCYTE [DISTWIDTH] IN BLOOD BY AUTOMATED COUNT: 12.2 % (ref 11.6–15.1)
GFR SERPL CREATININE-BSD FRML MDRD: 84 ML/MIN/1.73SQ M
GLUCOSE SERPL-MCNC: 100 MG/DL (ref 65–140)
HCT VFR BLD AUTO: 46.6 % (ref 36.5–49.3)
HGB BLD-MCNC: 16.1 G/DL (ref 12–17)
IGA SERPL-MCNC: 36 MG/DL (ref 70–400)
IGG SERPL-MCNC: 284 MG/DL (ref 700–1600)
IGM SERPL-MCNC: 51 MG/DL (ref 40–230)
IMM GRANULOCYTES # BLD AUTO: 0.08 THOUSAND/UL (ref 0–0.2)
IMM GRANULOCYTES NFR BLD AUTO: 1 % (ref 0–2)
LYMPHOCYTES # BLD AUTO: 1.1 THOUSANDS/ÂΜL (ref 0.6–4.47)
LYMPHOCYTES NFR BLD AUTO: 15 % (ref 14–44)
MCH RBC QN AUTO: 31.1 PG (ref 26.8–34.3)
MCHC RBC AUTO-ENTMCNC: 34.5 G/DL (ref 31.4–37.4)
MCV RBC AUTO: 90 FL (ref 82–98)
MONOCYTES # BLD AUTO: 0.53 THOUSAND/ÂΜL (ref 0.17–1.22)
MONOCYTES NFR BLD AUTO: 7 % (ref 4–12)
NEUTROPHILS # BLD AUTO: 5.28 THOUSANDS/ÂΜL (ref 1.85–7.62)
NEUTS SEG NFR BLD AUTO: 73 % (ref 43–75)
NRBC BLD AUTO-RTO: 0 /100 WBCS
PLATELET # BLD AUTO: 223 THOUSANDS/UL (ref 149–390)
PMV BLD AUTO: 11.2 FL (ref 8.9–12.7)
POTASSIUM SERPL-SCNC: 4.5 MMOL/L (ref 3.5–5.3)
PROT SERPL-MCNC: 6.6 G/DL (ref 6.4–8.4)
RBC # BLD AUTO: 5.17 MILLION/UL (ref 3.88–5.62)
SODIUM SERPL-SCNC: 138 MMOL/L (ref 135–147)
WBC # BLD AUTO: 7.27 THOUSAND/UL (ref 4.31–10.16)

## 2022-11-02 LAB
KAPPA LC FREE SER-MCNC: 11.4 MG/L (ref 3.3–19.4)
KAPPA LC FREE/LAMBDA FREE SER: 3.17 {RATIO} (ref 0.26–1.65)
LAMBDA LC FREE SERPL-MCNC: 3.6 MG/L (ref 5.7–26.3)

## 2022-11-03 LAB
ALBUMIN SERPL ELPH-MCNC: 4.44 G/DL (ref 3.5–5)
ALBUMIN SERPL ELPH-MCNC: 69.3 % (ref 52–65)
ALPHA1 GLOB SERPL ELPH-MCNC: 0.29 G/DL (ref 0.1–0.4)
ALPHA1 GLOB SERPL ELPH-MCNC: 4.5 % (ref 2.5–5)
ALPHA2 GLOB SERPL ELPH-MCNC: 0.65 G/DL (ref 0.4–1.2)
ALPHA2 GLOB SERPL ELPH-MCNC: 10.1 % (ref 7–13)
BETA GLOB ABNORMAL SERPL ELPH-MCNC: 0.42 G/DL (ref 0.4–0.8)
BETA1 GLOB SERPL ELPH-MCNC: 6.5 % (ref 5–13)
BETA2 GLOB SERPL ELPH-MCNC: 4.2 % (ref 2–8)
BETA2+GAMMA GLOB SERPL ELPH-MCNC: 0.27 G/DL (ref 0.2–0.5)
GAMMA GLOB ABNORMAL SERPL ELPH-MCNC: 0.35 G/DL (ref 0.5–1.6)
GAMMA GLOB SERPL ELPH-MCNC: 5.4 % (ref 12–22)
IGG/ALB SER: 2.26 {RATIO} (ref 1.1–1.8)
M PROTEIN 1 MFR SERPL ELPH: 1.1 %
M PROTEIN 1 SERPL ELPH-MCNC: 0.07 G/DL
PROT PATTERN SERPL ELPH-IMP: ABNORMAL
PROT SERPL-MCNC: 6.4 G/DL (ref 6.4–8.2)

## 2022-11-07 ENCOUNTER — OFFICE VISIT (OUTPATIENT)
Dept: HEMATOLOGY ONCOLOGY | Facility: CLINIC | Age: 67
End: 2022-11-07

## 2022-11-07 VITALS
DIASTOLIC BLOOD PRESSURE: 78 MMHG | HEART RATE: 82 BPM | SYSTOLIC BLOOD PRESSURE: 124 MMHG | HEIGHT: 67 IN | RESPIRATION RATE: 17 BRPM | OXYGEN SATURATION: 97 % | WEIGHT: 154 LBS | BODY MASS INDEX: 24.17 KG/M2

## 2022-11-07 DIAGNOSIS — C88.0 WALDENSTROM'S MACROGLOBULINEMIA (HCC): Primary | ICD-10-CM

## 2022-11-07 DIAGNOSIS — D72.820 MONOCLONAL B-CELL LYMPHOCYTOSIS: ICD-10-CM

## 2022-11-07 PROBLEM — D64.9 ANEMIA, UNSPECIFIED: Status: RESOLVED | Noted: 2018-04-30 | Resolved: 2022-11-07

## 2022-11-07 NOTE — PROGRESS NOTES
[address] [facility] [address] [address] [phone]  Oncology Progress Note  Kamran Navarrete, 1955, [id number]  11/7/2022        Assessment/Plan:   1  Waldenstrom's macroglobulinemia ([facility] [address] )  2  Monoclonal B-cell lymphocytosis   Patient is a 79-year-old male with a history of Waldenstrom's currently on ibrutinib 280 mg p o  once a day  Overall tolerating it without difficulty  Most recent CBC and CMP are within normal limits  Parameters have been stable, SPEP IgM kappa monoclonal peak 0 07, kappa lambda ratio 3 17, quantitative immunoglobulins show an IgA of 36, IgG 284, normal IgM all are within his established range  Overall he is able to function without restriction  He recently started Medicare and has financial concerns with new deductibles he is learning the Medicare process  We discussed stretching out his followups to 4 months given his stability  Patient is agreeable we will see him in early March  - CBC and differential; Future  - Comprehensive metabolic panel; Future  - IgG, IgA, IgM; Future  - Immunoglobulin free LT chains blood; Future  - Protein electrophoresis, serum; Future    Goals and Barriers:    Current Goal:   Prolong Survival from Cancer  Barriers: None  Patient's Capacity to Self Care:  Patient is able to self care  [address]  ______________________________________________________________________________________________________________    Subjective     History of present illness/Cancer History:   Oncology History   [facility]   6/8/2018 Biopsy    Overall, the combination of morphologic and immunophenotypic features is consistent with 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 with small cell size and overall low grade appearance   The morphologic features and nonspecific immunophenotype are most consistent with marginal zone lymphoma or lymphoplasmacytic lymphoma  The presence of a monoclonal IgM expressing plasma cell population with expression of the same light chain as the malignant B-cell population in the setting of IgM monoclonal paraprotein may favor lymphoplasmacytic lymphoma, though is not definitively specific  MYD 88 +      2018 Initial Diagnosis    Waldenstrom's macroglobulinemia [facility])  2018 patient had hernia repair  Leukocytosis was noted  Peripheral blood flow cytometry showed findings consistent with monoclonal B-cell lymphocytosis  Additionally, he was found to have a kappa lambda ratio of 36 0  IgM 3700 with reciprocal inhibition  Skeletal survey showed no lytic bone foci  2018 -  Chemotherapy    Ibrutinib 280 mg p o  daily  Lab Results   Component Value Date    WBC 7 27 2022    HGB 16 1 2022    HCT 46 6 2022    MCV 90 2022     2022     Lab Results   Component Value Date    SODIUM 138 2022    K 4 5 2022     2022    CO2 28 2022    AGAP 4 2022    BUN 10 2022    CREATININE 0 94 2022    GLUC 100 2022    GLUF 141 (H) 2020    CALCIUM 9 3 2022    AST 16 2022    ALT 29 2022    ALKPHOS 78 2022    TP 6 6 2022    TP 6 4 2022    TBILI 0 94 2022    EGFR 84 2022     Interval history:  Clinically stable     ECO - Asymptomatic    Review of Systems   Constitutional: Negative for activity change, appetite change, fatigue, fever and unexpected weight change  Respiratory: Negative for cough and shortness of breath  Cardiovascular: Negative for chest pain and leg swelling  Gastrointestinal: Negative for abdominal pain, constipation, diarrhea and nausea  Endocrine: Negative for cold intolerance and heat intolerance  Musculoskeletal: Negative for arthralgias and myalgias  Skin: Negative  Neurological: Negative for dizziness, weakness and headaches  Hematological: Negative for adenopathy  Does not bruise/bleed easily  Current Outpatient Medications:   •  Acetaminophen 325 MG CAPS, Take by mouth as needed , Disp: , Rfl:   •  Imbruvica 140 MG CAPS, Take 2 capsules (280 mg total) by mouth once each day , Disp: 60 capsule, Rfl: 10  •  Multiple Vitamin (MULTI VITAMIN MENS PO), Take 1 tablet by mouth daily, Disp: , Rfl:   No Known Allergies    Objective   /78 (BP Location: Left arm, Patient Position: Sitting, Cuff Size: Adult)   Pulse 82   Resp 17   Ht 5' 7" (1 702 m)   Wt 69 9 kg (154 lb)   SpO2 97%   BMI 24 12 kg/m²   Wt Readings from Last 6 Encounters:   11/07/22 69 9 kg (154 lb)   08/04/22 67 9 kg (149 lb 9 6 oz)   06/21/22 69 9 kg (154 lb)   05/05/22 72 1 kg (159 lb)   01/05/22 69 9 kg (154 lb)   10/06/21 69 4 kg (153 lb)     Physical Exam  Constitutional:       Appearance: Normal appearance  He is well-developed  HENT:      Head: Normocephalic and atraumatic  Eyes:      Pupils: Pupils are equal, round, and reactive to light  Pulmonary:      Effort: Pulmonary effort is normal  No respiratory distress  Musculoskeletal:         General: Normal range of motion  Cervical back: Normal range of motion  Lymphadenopathy:      Cervical: No cervical adenopathy  Skin:     General: Skin is dry  Neurological:      Mental Status: He is alert and oriented to person, place, and time     Psychiatric:         Behavior: Behavior normal      The following historical data was reviewed:  Past Medical History:   Diagnosis Date   • Anemia, unspecified 4/30/2018   • COVID-19 03/17/2021   • Hyperglycemia     Last Assessed:1/18/18   • Inguinal hernia bilateral, non-recurrent 2/19/2018   • Respiratory tract infection due to COVID-19 virus 3/18/2021   • Respiratory tract infection due to COVID-19 virus 3/18/2021   • Rib pain on left side 5/1/2018   • Unilateral inguinal hernia without obstruction or gangrene 1/18/2018   • Waldenstrom's macroglobulinemia ([facility] [address] ) 6/21/2018     Past Surgical History:   Procedure Laterality Date   • WISDOM TOOTH EXTRACTION      Last Assessed:1/18/18     Please note: This report has been generated by a voice recognition software system  Therefore there may be syntax, spelling, and/or grammatical errors  Please call if you have any questions

## 2023-03-01 ENCOUNTER — TELEPHONE (OUTPATIENT)
Dept: HEMATOLOGY ONCOLOGY | Facility: CLINIC | Age: 68
End: 2023-03-01

## 2023-03-02 ENCOUNTER — APPOINTMENT (OUTPATIENT)
Dept: LAB | Facility: CLINIC | Age: 68
End: 2023-03-02

## 2023-03-02 DIAGNOSIS — C88.0 WALDENSTROM'S MACROGLOBULINEMIA (HCC): ICD-10-CM

## 2023-03-02 DIAGNOSIS — D72.820 MONOCLONAL B-CELL LYMPHOCYTOSIS: ICD-10-CM

## 2023-03-02 LAB
ALBUMIN SERPL BCP-MCNC: 4.2 G/DL (ref 3.5–5)
ALP SERPL-CCNC: 79 U/L (ref 46–116)
ALT SERPL W P-5'-P-CCNC: 33 U/L (ref 12–78)
ANION GAP SERPL CALCULATED.3IONS-SCNC: 4 MMOL/L (ref 4–13)
AST SERPL W P-5'-P-CCNC: 25 U/L (ref 5–45)
BASOPHILS # BLD AUTO: 0.1 THOUSANDS/ÂΜL (ref 0–0.1)
BASOPHILS NFR BLD AUTO: 1 % (ref 0–1)
BILIRUB SERPL-MCNC: 1.18 MG/DL (ref 0.2–1)
BUN SERPL-MCNC: 16 MG/DL (ref 5–25)
CALCIUM SERPL-MCNC: 9.7 MG/DL (ref 8.3–10.1)
CHLORIDE SERPL-SCNC: 104 MMOL/L (ref 96–108)
CO2 SERPL-SCNC: 26 MMOL/L (ref 21–32)
CREAT SERPL-MCNC: 0.86 MG/DL (ref 0.6–1.3)
EOSINOPHIL # BLD AUTO: 0.04 THOUSAND/ÂΜL (ref 0–0.61)
EOSINOPHIL NFR BLD AUTO: 0 % (ref 0–6)
ERYTHROCYTE [DISTWIDTH] IN BLOOD BY AUTOMATED COUNT: 11.9 % (ref 11.6–15.1)
GFR SERPL CREATININE-BSD FRML MDRD: 90 ML/MIN/1.73SQ M
GLUCOSE SERPL-MCNC: 93 MG/DL (ref 65–140)
HCT VFR BLD AUTO: 44.9 % (ref 36.5–49.3)
HGB BLD-MCNC: 15.9 G/DL (ref 12–17)
IGA SERPL-MCNC: 37 MG/DL (ref 70–400)
IGG SERPL-MCNC: 298 MG/DL (ref 700–1600)
IGM SERPL-MCNC: 51 MG/DL (ref 40–230)
IMM GRANULOCYTES # BLD AUTO: 0.09 THOUSAND/UL (ref 0–0.2)
IMM GRANULOCYTES NFR BLD AUTO: 1 % (ref 0–2)
LYMPHOCYTES # BLD AUTO: 1.58 THOUSANDS/ÂΜL (ref 0.6–4.47)
LYMPHOCYTES NFR BLD AUTO: 17 % (ref 14–44)
MCH RBC QN AUTO: 30.9 PG (ref 26.8–34.3)
MCHC RBC AUTO-ENTMCNC: 35.4 G/DL (ref 31.4–37.4)
MCV RBC AUTO: 87 FL (ref 82–98)
MONOCYTES # BLD AUTO: 0.78 THOUSAND/ÂΜL (ref 0.17–1.22)
MONOCYTES NFR BLD AUTO: 8 % (ref 4–12)
NEUTROPHILS # BLD AUTO: 6.67 THOUSANDS/ÂΜL (ref 1.85–7.62)
NEUTS SEG NFR BLD AUTO: 73 % (ref 43–75)
NRBC BLD AUTO-RTO: 0 /100 WBCS
PLATELET # BLD AUTO: 225 THOUSANDS/UL (ref 149–390)
PMV BLD AUTO: 10.9 FL (ref 8.9–12.7)
POTASSIUM SERPL-SCNC: 3.8 MMOL/L (ref 3.5–5.3)
PROT SERPL-MCNC: 6.6 G/DL (ref 6.4–8.4)
RBC # BLD AUTO: 5.15 MILLION/UL (ref 3.88–5.62)
SODIUM SERPL-SCNC: 134 MMOL/L (ref 135–147)
WBC # BLD AUTO: 9.26 THOUSAND/UL (ref 4.31–10.16)

## 2023-03-03 LAB
ALBUMIN SERPL ELPH-MCNC: 4.65 G/DL (ref 3.5–5)
ALBUMIN SERPL ELPH-MCNC: 72.6 % (ref 52–65)
ALPHA1 GLOB SERPL ELPH-MCNC: 0.26 G/DL (ref 0.1–0.4)
ALPHA1 GLOB SERPL ELPH-MCNC: 4.1 % (ref 2.5–5)
ALPHA2 GLOB SERPL ELPH-MCNC: 0.58 G/DL (ref 0.4–1.2)
ALPHA2 GLOB SERPL ELPH-MCNC: 9 % (ref 7–13)
BETA GLOB ABNORMAL SERPL ELPH-MCNC: 0.4 G/DL (ref 0.4–0.8)
BETA1 GLOB SERPL ELPH-MCNC: 6.2 % (ref 5–13)
BETA2 GLOB SERPL ELPH-MCNC: 3.7 % (ref 2–8)
BETA2+GAMMA GLOB SERPL ELPH-MCNC: 0.24 G/DL (ref 0.2–0.5)
GAMMA GLOB ABNORMAL SERPL ELPH-MCNC: 0.28 G/DL (ref 0.5–1.6)
GAMMA GLOB SERPL ELPH-MCNC: 4.4 % (ref 12–22)
IGG/ALB SER: 2.65 {RATIO} (ref 1.1–1.8)
KAPPA LC FREE SER-MCNC: 10.2 MG/L (ref 3.3–19.4)
KAPPA LC FREE/LAMBDA FREE SER: 2.91 {RATIO} (ref 0.26–1.65)
LAMBDA LC FREE SERPL-MCNC: 3.5 MG/L (ref 5.7–26.3)
M PROTEIN 1 MFR SERPL ELPH: 0.8 %
M PROTEIN 1 SERPL ELPH-MCNC: 0.05 G/DL
PROT PATTERN SERPL ELPH-IMP: ABNORMAL
PROT SERPL-MCNC: 6.4 G/DL (ref 6.4–8.2)

## 2023-03-06 ENCOUNTER — TELEPHONE (OUTPATIENT)
Dept: HEMATOLOGY ONCOLOGY | Facility: CLINIC | Age: 68
End: 2023-03-06

## 2023-03-06 ENCOUNTER — OFFICE VISIT (OUTPATIENT)
Dept: HEMATOLOGY ONCOLOGY | Facility: CLINIC | Age: 68
End: 2023-03-06

## 2023-03-06 VITALS
HEART RATE: 76 BPM | TEMPERATURE: 98.2 F | WEIGHT: 157 LBS | HEIGHT: 67 IN | DIASTOLIC BLOOD PRESSURE: 80 MMHG | BODY MASS INDEX: 24.64 KG/M2 | SYSTOLIC BLOOD PRESSURE: 123 MMHG | RESPIRATION RATE: 17 BRPM | OXYGEN SATURATION: 98 %

## 2023-03-06 DIAGNOSIS — C88.0 WALDENSTROM'S MACROGLOBULINEMIA (HCC): Primary | ICD-10-CM

## 2023-03-06 NOTE — PROGRESS NOTES
[id number] [facility] [address] [address] [phone]  Oncology Progress Note  Santa Berger, 1955, [id number]  3/6/2023        Assessment/Plan:   1  Waldenstrom's macroglobulinemia [facility])   Patient is a 44-year-old male with a history of [place]'s macroglobulinemia currently on ibrutinib 281 mg p o  daily  He tolerates without difficulty  CBC with differential was within normal limits  Metabolic panel shows slightly decreased sodium at 134, total bilirubin 1 18 otherwise stable  Patient states he had been taking care of his mother after hospitalization and likely not eating or drinking well  We will continue to monitor  SPEP shows a monoclonal peak 0 05, quantitative immunoglobulins shows an IgA 37 0, IgG 298 0, IgM 51, immunoglobulin free light chains shows a decreased lambda 3 5, ratio is 2 91, all are within his established range  Patient is able to function without restriction  Continues to take care of his 80-year-old mother  We discussed extending visits from 3 months to 6 months with 3-month interval labs  I will contact him if labs are outside his range  Patient verbalizes understanding and is in agreement with the plan  - CBC and differential; Standing  - Comprehensive metabolic panel; Standing  - Immunoglobulin free LT chains blood; Standing  - IgG, IgA, IgM; Standing  - CBC and differential  - Comprehensive metabolic panel  - Immunoglobulin free LT chains blood  - IgG, IgA, IgM    Goals and Barriers:    Current Goal:   Prolong Survival from Cancer  Barriers: None  Patient's Capacity to Self Care:  Patient is able to self care      [address]  ______________________________________________________________________________________________________________    Subjective     History of present illness/Cancer History:   Oncology History   Waldenstrom's macroglobulinemia ([facility] [address] )   2018 Biopsy    Overall, the combination of morphologic and immunophenotypic features is consistent with 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 with small cell size and overall low grade appearance  The morphologic features and nonspecific immunophenotype are most consistent with marginal zone lymphoma or lymphoplasmacytic lymphoma  The presence of a monoclonal IgM expressing plasma cell population with expression of the same light chain as the malignant B-cell population in the setting of IgM monoclonal paraprotein may favor lymphoplasmacytic lymphoma, though is not definitively specific  MYD 88 +      2018 Initial Diagnosis    Waldenstrom's macroglobulinemia [facility])  2018 patient had hernia repair  Leukocytosis was noted  Peripheral blood flow cytometry showed findings consistent with monoclonal B-cell lymphocytosis  Additionally, he was found to have a kappa lambda ratio of 36 0  IgM 3700 with reciprocal inhibition  Skeletal survey showed no lytic bone foci  2018 -  Chemotherapy    Ibrutinib 280 mg p o  daily  Lab Results   Component Value Date    WBC 9 26 2023    HGB 15 9 2023    HCT 44 9 2023    MCV 87 2023     2023     Lab Results   Component Value Date    SODIUM 134 (L) 2023    K 3 8 2023     2023    CO2 26 2023    AGAP 4 2023    BUN 16 2023    CREATININE 0 86 2023    GLUC 93 2023    GLUF 141 (H) 2020    CALCIUM 9 7 2023    AST 25 2023    ALT 33 2023    ALKPHOS 79 2023    TP 6 6 2023    TP 6 4 2023    TBILI 1 18 (H) 2023    EGFR 90 2023     Interval history:  Clinically stable     ECO - Symptomatic but completely ambulatory    Review of Systems   Constitutional: Positive for fatigue   Negative for activity change, appetite change, fever and unexpected weight change  Respiratory: Negative for cough and shortness of breath  Cardiovascular: Negative for chest pain and leg swelling  Gastrointestinal: Negative for abdominal pain, constipation, diarrhea and nausea  Endocrine: Negative for cold intolerance and heat intolerance  Musculoskeletal: Negative for arthralgias and myalgias  Skin: Negative  Neurological: Negative for dizziness, weakness and headaches  Hematological: Negative for adenopathy  Does not bruise/bleed easily  Current Outpatient Medications:   •  Acetaminophen 325 MG CAPS, Take by mouth as needed , Disp: , Rfl:   •  Imbruvica 140 MG CAPS, Take 2 capsules (280 mg total) by mouth once each day , Disp: 60 capsule, Rfl: 10  •  Multiple Vitamin (MULTI VITAMIN MENS PO), Take 1 tablet by mouth daily, Disp: , Rfl:   No Known Allergies    Objective   /80 (BP Location: Right arm, Patient Position: Sitting, Cuff Size: Standard)   Pulse 76   Temp 98 2 °F (36 8 °C) (Temporal)   Resp 17   Ht 5' 7" (1 702 m)   Wt 71 2 kg (157 lb)   SpO2 98%   BMI 24 59 kg/m²   Wt Readings from Last 6 Encounters:   03/06/23 71 2 kg (157 lb)   11/07/22 69 9 kg (154 lb)   08/04/22 67 9 kg (149 lb 9 6 oz)   06/21/22 69 9 kg (154 lb)   05/05/22 72 1 kg (159 lb)   01/05/22 69 9 kg (154 lb)     Physical Exam  Vitals reviewed  Constitutional:       Appearance: Normal appearance  He is well-developed  HENT:      Head: Normocephalic and atraumatic  Eyes:      Pupils: Pupils are equal, round, and reactive to light  Pulmonary:      Effort: Pulmonary effort is normal  No respiratory distress  Musculoskeletal:         General: Normal range of motion  Cervical back: Normal range of motion  Lymphadenopathy:      Cervical: No cervical adenopathy  Skin:     General: Skin is dry  Neurological:      Mental Status: He is alert and oriented to person, place, and time     Psychiatric:         Behavior: Behavior normal      The following historical data was reviewed:  Past Medical History:   Diagnosis Date   • Anemia, unspecified 4/30/2018   • COVID-19 03/17/2021   • Hyperglycemia     Last Assessed:1/18/18   • Inguinal hernia bilateral, non-recurrent 2/19/2018   • Respiratory tract infection due to COVID-19 virus 3/18/2021   • Respiratory tract infection due to COVID-19 virus 3/18/2021   • Rib pain on left side 5/1/2018   • Unilateral inguinal hernia without obstruction or gangrene 1/18/2018   • Waldenstrom's macroglobulinemia ([facility] ) 6/21/2018     Past Surgical History:   Procedure Laterality Date   • WISDOM TOOTH EXTRACTION      Last Assessed:1/18/18     Please note: This report has been generated by a voice recognition software system  Therefore there may be syntax, spelling, and/or grammatical errors  Please call if you have any questions

## 2023-03-06 NOTE — TELEPHONE ENCOUNTER
CALL RETURN FORM   Reason for patient call? Patient lost his blue glasses at his appointment today at the [address] office  He is requesting to be contacted when they are found  Patient's primary oncologist? Enoch Xavier, [facility]    Name of person the patient was calling for? [address] office   Any additional information to add, if applicable? N/A   Informed patient that the message will be forwarded to the team and someone will get back to them as soon as possible    Did you relay this information to the patient?  Yes

## 2023-06-07 ENCOUNTER — APPOINTMENT (OUTPATIENT)
Dept: LAB | Facility: CLINIC | Age: 68
End: 2023-06-07
Payer: COMMERCIAL

## 2023-06-07 LAB
ALBUMIN SERPL BCP-MCNC: 4.1 G/DL (ref 3.5–5)
ALP SERPL-CCNC: 72 U/L (ref 46–116)
ALT SERPL W P-5'-P-CCNC: 34 U/L (ref 12–78)
ANION GAP SERPL CALCULATED.3IONS-SCNC: 2 MMOL/L (ref 4–13)
AST SERPL W P-5'-P-CCNC: 24 U/L (ref 5–45)
BASOPHILS # BLD AUTO: 0.08 THOUSANDS/ÂΜL (ref 0–0.1)
BASOPHILS NFR BLD AUTO: 1 % (ref 0–1)
BILIRUB SERPL-MCNC: 0.84 MG/DL (ref 0.2–1)
BUN SERPL-MCNC: 12 MG/DL (ref 5–25)
CALCIUM SERPL-MCNC: 9.2 MG/DL (ref 8.3–10.1)
CHLORIDE SERPL-SCNC: 106 MMOL/L (ref 96–108)
CO2 SERPL-SCNC: 27 MMOL/L (ref 21–32)
CREAT SERPL-MCNC: 0.91 MG/DL (ref 0.6–1.3)
EOSINOPHIL # BLD AUTO: 0.21 THOUSAND/ÂΜL (ref 0–0.61)
EOSINOPHIL NFR BLD AUTO: 3 % (ref 0–6)
ERYTHROCYTE [DISTWIDTH] IN BLOOD BY AUTOMATED COUNT: 12.2 % (ref 11.6–15.1)
GFR SERPL CREATININE-BSD FRML MDRD: 88 ML/MIN/1.73SQ M
GLUCOSE SERPL-MCNC: 86 MG/DL (ref 65–140)
HCT VFR BLD AUTO: 45 % (ref 36.5–49.3)
HGB BLD-MCNC: 15.8 G/DL (ref 12–17)
IGA SERPL-MCNC: 37 MG/DL (ref 70–400)
IGG SERPL-MCNC: 300 MG/DL (ref 700–1600)
IGM SERPL-MCNC: 64 MG/DL (ref 40–230)
IMM GRANULOCYTES # BLD AUTO: 0.04 THOUSAND/UL (ref 0–0.2)
IMM GRANULOCYTES NFR BLD AUTO: 1 % (ref 0–2)
LYMPHOCYTES # BLD AUTO: 1.92 THOUSANDS/ÂΜL (ref 0.6–4.47)
LYMPHOCYTES NFR BLD AUTO: 26 % (ref 14–44)
MCH RBC QN AUTO: 31.1 PG (ref 26.8–34.3)
MCHC RBC AUTO-ENTMCNC: 35.1 G/DL (ref 31.4–37.4)
MCV RBC AUTO: 89 FL (ref 82–98)
MONOCYTES # BLD AUTO: 0.68 THOUSAND/ÂΜL (ref 0.17–1.22)
MONOCYTES NFR BLD AUTO: 9 % (ref 4–12)
NEUTROPHILS # BLD AUTO: 4.34 THOUSANDS/ÂΜL (ref 1.85–7.62)
NEUTS SEG NFR BLD AUTO: 60 % (ref 43–75)
NRBC BLD AUTO-RTO: 0 /100 WBCS
PLATELET # BLD AUTO: 189 THOUSANDS/UL (ref 149–390)
PMV BLD AUTO: 11.3 FL (ref 8.9–12.7)
POTASSIUM SERPL-SCNC: 4.2 MMOL/L (ref 3.5–5.3)
PROT SERPL-MCNC: 6.6 G/DL (ref 6.4–8.4)
RBC # BLD AUTO: 5.08 MILLION/UL (ref 3.88–5.62)
SODIUM SERPL-SCNC: 135 MMOL/L (ref 135–147)
WBC # BLD AUTO: 7.27 THOUSAND/UL (ref 4.31–10.16)

## 2023-06-08 LAB
KAPPA LC FREE SER-MCNC: 9.3 MG/L (ref 3.3–19.4)
KAPPA LC FREE/LAMBDA FREE SER: 3 {RATIO} (ref 0.26–1.65)
LAMBDA LC FREE SERPL-MCNC: 3.1 MG/L (ref 5.7–26.3)

## 2023-06-27 ENCOUNTER — OFFICE VISIT (OUTPATIENT)
Dept: INTERNAL MEDICINE CLINIC | Facility: CLINIC | Age: 68
End: 2023-06-27
Payer: COMMERCIAL

## 2023-06-27 VITALS
WEIGHT: 151.4 LBS | SYSTOLIC BLOOD PRESSURE: 108 MMHG | OXYGEN SATURATION: 99 % | DIASTOLIC BLOOD PRESSURE: 70 MMHG | HEIGHT: 66 IN | TEMPERATURE: 99.1 F | BODY MASS INDEX: 24.33 KG/M2 | HEART RATE: 76 BPM

## 2023-06-27 DIAGNOSIS — Z00.00 WELCOME TO MEDICARE PREVENTIVE VISIT: Primary | ICD-10-CM

## 2023-06-27 DIAGNOSIS — C88.0 WALDENSTROM'S MACROGLOBULINEMIA (HCC): ICD-10-CM

## 2023-06-27 DIAGNOSIS — Z12.5 SCREENING FOR PROSTATE CANCER: ICD-10-CM

## 2023-06-27 PROCEDURE — G0403 EKG FOR INITIAL PREVENT EXAM: HCPCS | Performed by: INTERNAL MEDICINE

## 2023-06-27 PROCEDURE — G0402 INITIAL PREVENTIVE EXAM: HCPCS | Performed by: INTERNAL MEDICINE

## 2023-06-27 NOTE — PROGRESS NOTES
Assessment and Plan:     Problem List Items Addressed This Visit        Other    Waldenstrom's macroglobulinemia ([address] )    Relevant Orders    CBC and differential    Comprehensive metabolic panel    Welcome to Medicare preventive visit - Primary     History of Waldenstrom's macroglobulinemia responding nicely to Imbruvica  No other medical issues  Relevant Orders    POCT ECG (Completed)    CBC and differential    Comprehensive metabolic panel    Lipid panel    PSA, Total Screen    Screening for prostate cancer    Relevant Orders    PSA, Total Screen        Preventive health issues were discussed with patient, and age appropriate screening tests were ordered as noted in patient's After Visit Summary  Personalized health advice and appropriate referrals for health education or preventive services given if needed, as noted in patient's After Visit Summary  History of Present Illness:     Patient presents for a Medicare Wellness Visit    Patient Medicare well no current complaints  History of Waldenstrom's macroglobulinemia which has been responding nicely to treatment with Imbruvica  Patient reports no secondary symptoms  He denies any weight loss, night sweats, fevers, changes in appetite  Overall he feels well  Recent labs were reviewed  Both CBC and CMP were within normal limits  His IgM level is at 51  IgG lambda free light chain level is low resulting in an increase of IgG kappa/ IgG lambda ratio  Patient Care Team:  Vero García MD as PCP - General (Internal Medicine)  Vero García MD as PCP - [address] [place] [address] (RTE)  Vero García MD as PCP - [facility] (RTE)     Review of Systems:     Review of Systems   Constitutional: Negative  Negative for activity change, appetite change, chills, diaphoresis, fatigue, fever and unexpected weight change  HENT: Negative  Eyes: Negative  Respiratory: Negative  Cardiovascular: Negative  Gastrointestinal: Negative  Endocrine: Negative  Genitourinary: Negative  Musculoskeletal: Negative  Skin: Negative  Neurological: Negative  Hematological: Negative  Psychiatric/Behavioral: The patient is not nervous/anxious         Problem List:     Patient Active Problem List   Diagnosis   • Monoclonal B-cell lymphocytosis   • Fall   • Waldenstrom's macroglobulinemia [facility])   • Folliculitis   • Encounter for annual physical exam   • Welcome to Medicare preventive visit   • Screening for prostate cancer      Past Medical and Surgical History:     Past Medical History:   Diagnosis Date   • Anemia, unspecified 4/30/2018   • COVID-19 03/17/2021   • Hyperglycemia     Last Assessed:1/18/18   • Inguinal hernia bilateral, non-recurrent 2/19/2018   • Respiratory tract infection due to COVID-19 virus 3/18/2021   • Respiratory tract infection due to COVID-19 virus 3/18/2021   • Rib pain on left side 5/1/2018   • Unilateral inguinal hernia without obstruction or gangrene 1/18/2018   • Waldenstrom's macroglobulinemia ([facility] [address] ) 6/21/2018     Past Surgical History:   Procedure Laterality Date   • WISDOM TOOTH EXTRACTION      Last Assessed:1/18/18      Family History:     Family History   Problem Relation Age of Onset   • Parkinsonism Mother    • Thyroid disease Mother    • Diabetes Father    • Other Father         Heart Artery Stent      Social History:     Social History     Socioeconomic History   • Marital status: Single     Spouse name: None   • Number of children: None   • Years of education: None   • Highest education level: None   Occupational History   • None   Tobacco Use   • Smoking status: Never   • Smokeless tobacco: Never   Vaping Use   • Vaping Use: Never used   Substance and Sexual Activity   • Alcohol use: No   • Drug use: No   • Sexual activity: None   Other Topics Concern   • None   Social History Narrative    Caffeine use     Social Determinants of Health     Financial Resource Strain: Low Risk  (6/27/2023)    Overall Financial Resource Strain (CARDIA)    • Difficulty of Paying Living Expenses: Not hard at all   Food Insecurity: Not on file   Transportation Needs: No Transportation Needs (6/27/2023)    PRAPARE - Transportation    • Lack of Transportation (Medical): No    • Lack of Transportation (Non-Medical): No   Physical Activity: Not on file   Stress: Not on file   Social Connections: Not on file   Intimate Partner Violence: Not on file   Housing Stability: Not on file      Medications and Allergies:     Current Outpatient Medications   Medication Sig Dispense Refill   • Acetaminophen 325 MG CAPS Take by mouth as needed      • Imbruvica 140 MG CAPS Take 2 capsules (280 mg total) by mouth once each day  60 capsule 10   • Multiple Vitamin (MULTI VITAMIN MENS PO) Take 1 tablet by mouth daily       No current facility-administered medications for this visit  No Known Allergies   Immunizations:     Immunization History   Administered Date(s) Administered   • INFLUENZA 10/31/2022   • Influenza, high dose seasonal 0 7 mL 10/31/2022   • Influenza, injectable, quadrivalent, preservative free 0 5 mL 11/05/2018   • Influenza, recombinant, quadrivalent,injectable, preservative free 09/24/2019, 09/22/2020, 10/29/2021   • Tdap 02/18/2022      Health Maintenance:         Topic Date Due   • HIV Screening  Never done   • Colorectal Cancer Screening  05/23/2028   • Hepatitis C Screening  Completed         Topic Date Due   • COVID-19 Vaccine (1) Never done   • Pneumococcal Vaccine: 65+ Years (1 - PCV) Never done      Medicare Screening Tests and Risk Assessments:     Meliton Hall is here for his Welcome to Medicare visit  Health Risk Assessment:   Patient rates overall health as good  Patient feels that their physical health rating is same  Patient is satisfied with their life  Eyesight was rated as same  Hearing was rated as same  Patient feels that their emotional and mental health rating is same   Patients states they are never, rarely angry  Patient states they are often unusually tired/fatigued  Pain experienced in the last 7 days has been none  Patient states that he has experienced no weight loss or gain in last 6 months  Depression Screening:   PHQ-2 Score: 0      Fall Risk Screening: In the past year, patient has experienced: no history of falling in past year      Home Safety:  Patient does not have trouble with stairs inside or outside of their home  Patient has working smoke alarms and has working carbon monoxide detector  Home safety hazards include: none  Nutrition:   Current diet is Other (please comment)  vegetarian -  No meat    Medications:   Patient is currently taking over-the-counter supplements  OTC medications include: see medication list  Patient is able to manage medications  Activities of Daily Living (ADLs)/Instrumental Activities of Daily Living (IADLs):   Walk and transfer into and out of bed and chair?: Yes  Dress and groom yourself?: Yes    Bathe or shower yourself?: Yes    Feed yourself? Yes  Do your laundry/housekeeping?: Yes  Manage your money, pay your bills and track your expenses?: Yes  Make your own meals?: Yes    Do your own shopping?: Yes    Durable Medical Equipment Suppliers  no    Previous Hospitalizations:   Any hospitalizations or ED visits within the last 12 months?: No      Advance Care Planning:   Living will: No    Durable POA for healthcare:  Yes    Advanced directive: No    Advanced directive counseling given: Yes    Five wishes given: No    Patient declined ACP directive: Yes    End of Life Decisions reviewed with patient: Yes    Provider agrees with end of life decisions: Yes      Cognitive Screening:   Provider or family/friend/caregiver concerned regarding cognition?: No    PREVENTIVE SCREENINGS      Cardiovascular Screening:    General: Risks and Benefits Discussed and Screening Current      Diabetes Screening:     General: Screening Current      Colorectal "Cancer Screening:     General: Screening Current      Prostate Cancer Screening:    General: Risks and Benefits Discussed    Due for: PSA      Osteoporosis Screening:    General: Screening Not Indicated      Abdominal Aortic Aneurysm (AAA) Screening:    Risk factors include: age between 73-67 yo        General: Screening Not Indicated      Lung Cancer Screening:     General: Screening Not Indicated      Hepatitis C Screening:    General: Screening Current    Screening, Brief Intervention, and Referral to Treatment (SBIRT)    Screening  Typical number of drinks in a day: 0  Typical number of drinks in a week: 0  Interpretation: Low risk drinking behavior  Single Item Drug Screening:  How often have you used an illegal drug (including marijuana) or a prescription medication for non-medical reasons in the past year? never    Single Item Drug Screen Score: 0  Interpretation: Negative screen for possible drug use disorder    No results found  Physical Exam:     /70 (BP Location: Left arm, Patient Position: Sitting, Cuff Size: Standard)   Pulse 76   Temp 99 1 °F (37 3 °C) (Tympanic)   Ht 5' 6\" (1 676 m)   Wt 68 7 kg (151 lb 6 4 oz)   SpO2 99%   BMI 24 44 kg/m²     Physical Exam  Vitals reviewed  Constitutional:       General: He is not in acute distress  Appearance: Normal appearance  He is normal weight  He is not ill-appearing, toxic-appearing or diaphoretic  HENT:      Head: Normocephalic and atraumatic  Right Ear: External ear normal  There is no impacted cerumen  Left Ear: External ear normal  There is no impacted cerumen  Nose: Nose normal    Eyes:      General: No scleral icterus  Conjunctiva/sclera: Conjunctivae normal       Pupils: Pupils are equal, round, and reactive to light  Cardiovascular:      Rate and Rhythm: Normal rate and regular rhythm  Pulses: Normal pulses  Heart sounds: Normal heart sounds  No murmur heard    Pulmonary:      Effort: Pulmonary " effort is normal  No respiratory distress  Breath sounds: Normal breath sounds  Abdominal:      General: Abdomen is flat  Bowel sounds are normal  There is no distension  Musculoskeletal:      Cervical back: Neck supple  Right lower leg: No edema  Left lower leg: No edema  Lymphadenopathy:      Cervical: No cervical adenopathy  Skin:     General: Skin is warm  Capillary Refill: Capillary refill takes less than 2 seconds  Coloration: Skin is not jaundiced  Findings: No bruising, erythema or rash  Neurological:      General: No focal deficit present  Mental Status: He is alert and oriented to person, place, and time  Mental status is at baseline  Cranial Nerves: No cranial nerve deficit  Motor: No weakness  Coordination: Coordination normal       Deep Tendon Reflexes: Reflexes normal    Psychiatric:         Mood and Affect: Mood normal          Behavior: Behavior normal      EKG: normal EKG, normal sinus rhythm      Tay Foley MD

## 2023-07-03 ENCOUNTER — TELEPHONE (OUTPATIENT)
Dept: LAB | Facility: HOSPITAL | Age: 68
End: 2023-07-03

## 2023-07-11 DIAGNOSIS — C88.0 WALDENSTROM'S MACROGLOBULINEMIA (HCC): ICD-10-CM

## 2023-07-12 ENCOUNTER — APPOINTMENT (OUTPATIENT)
Dept: LAB | Facility: HOSPITAL | Age: 68
End: 2023-07-12
Attending: INTERNAL MEDICINE
Payer: COMMERCIAL

## 2023-07-12 DIAGNOSIS — Z00.00 WELCOME TO MEDICARE PREVENTIVE VISIT: ICD-10-CM

## 2023-07-12 DIAGNOSIS — Z12.5 SCREENING FOR PROSTATE CANCER: ICD-10-CM

## 2023-07-12 DIAGNOSIS — C88.0 WALDENSTROM'S MACROGLOBULINEMIA (HCC): ICD-10-CM

## 2023-07-12 LAB
ALBUMIN SERPL BCP-MCNC: 3.8 G/DL (ref 3.5–5)
ALP SERPL-CCNC: 69 U/L (ref 46–116)
ALT SERPL W P-5'-P-CCNC: 28 U/L (ref 12–78)
ANION GAP SERPL CALCULATED.3IONS-SCNC: 3 MMOL/L
AST SERPL W P-5'-P-CCNC: 22 U/L (ref 5–45)
BASOPHILS # BLD AUTO: 0.08 THOUSANDS/ÂΜL (ref 0–0.1)
BASOPHILS NFR BLD AUTO: 1 % (ref 0–1)
BILIRUB SERPL-MCNC: 0.71 MG/DL (ref 0.2–1)
BUN SERPL-MCNC: 13 MG/DL (ref 5–25)
CALCIUM SERPL-MCNC: 8.9 MG/DL (ref 8.3–10.1)
CHLORIDE SERPL-SCNC: 106 MMOL/L (ref 96–108)
CHOLEST SERPL-MCNC: 247 MG/DL
CO2 SERPL-SCNC: 28 MMOL/L (ref 21–32)
CREAT SERPL-MCNC: 0.89 MG/DL (ref 0.6–1.3)
EOSINOPHIL # BLD AUTO: 0.2 THOUSAND/ÂΜL (ref 0–0.61)
EOSINOPHIL NFR BLD AUTO: 3 % (ref 0–6)
ERYTHROCYTE [DISTWIDTH] IN BLOOD BY AUTOMATED COUNT: 12.7 % (ref 11.6–15.1)
GFR SERPL CREATININE-BSD FRML MDRD: 89 ML/MIN/1.73SQ M
GLUCOSE P FAST SERPL-MCNC: 72 MG/DL (ref 65–99)
HCT VFR BLD AUTO: 44.9 % (ref 36.5–49.3)
HDLC SERPL-MCNC: 75 MG/DL
HGB BLD-MCNC: 15.2 G/DL (ref 12–17)
IMM GRANULOCYTES # BLD AUTO: 0.08 THOUSAND/UL (ref 0–0.2)
IMM GRANULOCYTES NFR BLD AUTO: 1 % (ref 0–2)
LDLC SERPL CALC-MCNC: 149 MG/DL (ref 0–100)
LYMPHOCYTES # BLD AUTO: 1.62 THOUSANDS/ÂΜL (ref 0.6–4.47)
LYMPHOCYTES NFR BLD AUTO: 21 % (ref 14–44)
MCH RBC QN AUTO: 31 PG (ref 26.8–34.3)
MCHC RBC AUTO-ENTMCNC: 33.9 G/DL (ref 31.4–37.4)
MCV RBC AUTO: 91 FL (ref 82–98)
MONOCYTES # BLD AUTO: 0.6 THOUSAND/ÂΜL (ref 0.17–1.22)
MONOCYTES NFR BLD AUTO: 8 % (ref 4–12)
NEUTROPHILS # BLD AUTO: 5.09 THOUSANDS/ÂΜL (ref 1.85–7.62)
NEUTS SEG NFR BLD AUTO: 66 % (ref 43–75)
NONHDLC SERPL-MCNC: 172 MG/DL
NRBC BLD AUTO-RTO: 0 /100 WBCS
PLATELET # BLD AUTO: 184 THOUSANDS/UL (ref 149–390)
PMV BLD AUTO: 11.4 FL (ref 8.9–12.7)
POTASSIUM SERPL-SCNC: 3.4 MMOL/L (ref 3.5–5.3)
PROT SERPL-MCNC: 6.4 G/DL (ref 6.4–8.4)
PSA SERPL-MCNC: 0.15 NG/ML (ref 0–4)
RBC # BLD AUTO: 4.91 MILLION/UL (ref 3.88–5.62)
SODIUM SERPL-SCNC: 137 MMOL/L (ref 135–147)
TRIGL SERPL-MCNC: 117 MG/DL
WBC # BLD AUTO: 7.67 THOUSAND/UL (ref 4.31–10.16)

## 2023-07-12 PROCEDURE — 85025 COMPLETE CBC W/AUTO DIFF WBC: CPT

## 2023-07-12 PROCEDURE — 36415 COLL VENOUS BLD VENIPUNCTURE: CPT

## 2023-07-12 PROCEDURE — 80061 LIPID PANEL: CPT

## 2023-07-12 PROCEDURE — G0103 PSA SCREENING: HCPCS

## 2023-07-12 PROCEDURE — 80053 COMPREHEN METABOLIC PANEL: CPT

## 2023-07-12 RX ORDER — IBRUTINIB 140 MG/1
CAPSULE ORAL
Qty: 60 CAPSULE | Refills: 10 | Status: SHIPPED | OUTPATIENT
Start: 2023-07-12

## 2023-08-07 ENCOUNTER — TELEPHONE (OUTPATIENT)
Dept: LAB | Facility: HOSPITAL | Age: 68
End: 2023-08-07

## 2023-08-07 ENCOUNTER — TELEPHONE (OUTPATIENT)
Dept: HEMATOLOGY ONCOLOGY | Facility: CLINIC | Age: 68
End: 2023-08-07

## 2023-08-07 DIAGNOSIS — D72.820 MONOCLONAL B-CELL LYMPHOCYTOSIS: ICD-10-CM

## 2023-08-07 DIAGNOSIS — C88.0 WALDENSTROM'S MACROGLOBULINEMIA (HCC): Primary | ICD-10-CM

## 2023-08-07 NOTE — TELEPHONE ENCOUNTER
Left VM for patient to let him know that lab orders have been placed and he can call mobile labs to schedule them to come out to his house to draw them.

## 2023-08-07 NOTE — TELEPHONE ENCOUNTER
Patient Call    Who are you speaking with? Patient    If it is not the patient, are they listed on an active communication consent form? Yes   What is the reason for this call? Patient wants to know if he can get a script to have the Mobile lab to come to his house   Does this require a call back? Yes   If a call back is required, please list best call back number [phone]   If a call back is required, advise that a message will be forwarded to their care team and someone will return their call as soon as possible. Did you relay this information to the patient?  Yes

## 2023-08-07 NOTE — TELEPHONE ENCOUNTER
Appointment Change  Cancel, Reschedule, Change to Virtual      Who are you speaking with? Patient   If it is not the patient, are they listed on an active communication consent form? N/A   Which provider is the appointment scheduled with? LAMONT Childers   When is the appointment scheduled? Please list date and time  9/6/23 @ 11am   At which location is the appointment scheduled to take place? [facility]   Was the appointment rescheduled or changed from an in person visit to a virtual visit? If so, please list the details of the change. Yes 8/30/23 @ 1pm   What is the reason for the appointment change? Patient has a conflict with appointment   Was [facility] transport scheduled for this visit? no   Does [facility] transport need to be scheduled for the new visit (if applicable) no   Does the patient need an infusion appointment rescheduled? no   Does the patient have an infusion appointment scheduled? If so, when? no   Is the patient undergoing chemotherapy? no   Was the no-show policy reviewed for appointments being changed with less then 24 hours of notice?  yes

## 2023-08-22 ENCOUNTER — APPOINTMENT (OUTPATIENT)
Dept: LAB | Facility: HOSPITAL | Age: 68
End: 2023-08-22
Attending: INTERNAL MEDICINE
Payer: COMMERCIAL

## 2023-08-22 DIAGNOSIS — C88.0 WALDENSTROM'S MACROGLOBULINEMIA (HCC): ICD-10-CM

## 2023-08-22 DIAGNOSIS — D72.820 MONOCLONAL B-CELL LYMPHOCYTOSIS: ICD-10-CM

## 2023-08-22 LAB
ALBUMIN SERPL BCP-MCNC: 3.9 G/DL (ref 3.5–5)
ALP SERPL-CCNC: 73 U/L (ref 46–116)
ALT SERPL W P-5'-P-CCNC: 27 U/L (ref 12–78)
ANION GAP SERPL CALCULATED.3IONS-SCNC: 6 MMOL/L
AST SERPL W P-5'-P-CCNC: 20 U/L (ref 5–45)
BASOPHILS # BLD AUTO: 0.08 THOUSANDS/ÂΜL (ref 0–0.1)
BASOPHILS NFR BLD AUTO: 1 % (ref 0–1)
BILIRUB SERPL-MCNC: 0.8 MG/DL (ref 0.2–1)
BUN SERPL-MCNC: 9 MG/DL (ref 5–25)
CALCIUM SERPL-MCNC: 9.2 MG/DL (ref 8.3–10.1)
CHLORIDE SERPL-SCNC: 108 MMOL/L (ref 96–108)
CO2 SERPL-SCNC: 25 MMOL/L (ref 21–32)
CREAT SERPL-MCNC: 0.89 MG/DL (ref 0.6–1.3)
EOSINOPHIL # BLD AUTO: 0.16 THOUSAND/ÂΜL (ref 0–0.61)
EOSINOPHIL NFR BLD AUTO: 2 % (ref 0–6)
ERYTHROCYTE [DISTWIDTH] IN BLOOD BY AUTOMATED COUNT: 12.4 % (ref 11.6–15.1)
GFR SERPL CREATININE-BSD FRML MDRD: 89 ML/MIN/1.73SQ M
GLUCOSE P FAST SERPL-MCNC: 93 MG/DL (ref 65–99)
HCT VFR BLD AUTO: 45.5 % (ref 36.5–49.3)
HGB BLD-MCNC: 15.5 G/DL (ref 12–17)
IGA SERPL-MCNC: 35 MG/DL (ref 70–400)
IGG SERPL-MCNC: 308 MG/DL (ref 700–1600)
IGM SERPL-MCNC: 52 MG/DL (ref 40–230)
IMM GRANULOCYTES # BLD AUTO: 0.06 THOUSAND/UL (ref 0–0.2)
IMM GRANULOCYTES NFR BLD AUTO: 1 % (ref 0–2)
LYMPHOCYTES # BLD AUTO: 1.7 THOUSANDS/ÂΜL (ref 0.6–4.47)
LYMPHOCYTES NFR BLD AUTO: 25 % (ref 14–44)
MCH RBC QN AUTO: 31.3 PG (ref 26.8–34.3)
MCHC RBC AUTO-ENTMCNC: 34.1 G/DL (ref 31.4–37.4)
MCV RBC AUTO: 92 FL (ref 82–98)
MONOCYTES # BLD AUTO: 0.58 THOUSAND/ÂΜL (ref 0.17–1.22)
MONOCYTES NFR BLD AUTO: 8 % (ref 4–12)
NEUTROPHILS # BLD AUTO: 4.35 THOUSANDS/ÂΜL (ref 1.85–7.62)
NEUTS SEG NFR BLD AUTO: 63 % (ref 43–75)
NRBC BLD AUTO-RTO: 0 /100 WBCS
PLATELET # BLD AUTO: 217 THOUSANDS/UL (ref 149–390)
PMV BLD AUTO: 11.2 FL (ref 8.9–12.7)
POTASSIUM SERPL-SCNC: 3.8 MMOL/L (ref 3.5–5.3)
PROT SERPL-MCNC: 6.6 G/DL (ref 6.4–8.4)
RBC # BLD AUTO: 4.96 MILLION/UL (ref 3.88–5.62)
SODIUM SERPL-SCNC: 139 MMOL/L (ref 135–147)
WBC # BLD AUTO: 6.93 THOUSAND/UL (ref 4.31–10.16)

## 2023-08-22 PROCEDURE — 36415 COLL VENOUS BLD VENIPUNCTURE: CPT

## 2023-08-22 PROCEDURE — 80053 COMPREHEN METABOLIC PANEL: CPT

## 2023-08-22 PROCEDURE — 83521 IG LIGHT CHAINS FREE EACH: CPT

## 2023-08-22 PROCEDURE — 86334 IMMUNOFIX E-PHORESIS SERUM: CPT

## 2023-08-22 PROCEDURE — 82784 ASSAY IGA/IGD/IGG/IGM EACH: CPT

## 2023-08-22 PROCEDURE — 84165 PROTEIN E-PHORESIS SERUM: CPT

## 2023-08-22 PROCEDURE — 85025 COMPLETE CBC W/AUTO DIFF WBC: CPT

## 2023-08-23 LAB
KAPPA LC FREE SER-MCNC: 9.9 MG/L (ref 3.3–19.4)
KAPPA LC FREE/LAMBDA FREE SER: 3 {RATIO} (ref 0.26–1.65)
LAMBDA LC FREE SERPL-MCNC: 3.3 MG/L (ref 5.7–26.3)

## 2023-08-24 LAB
ALBUMIN SERPL ELPH-MCNC: 4.58 G/DL (ref 3.2–5.1)
ALBUMIN SERPL ELPH-MCNC: 71.5 % (ref 48–70)
ALPHA1 GLOB SERPL ELPH-MCNC: 0.25 G/DL (ref 0.15–0.47)
ALPHA1 GLOB SERPL ELPH-MCNC: 3.9 % (ref 1.8–7)
ALPHA2 GLOB SERPL ELPH-MCNC: 0.6 G/DL (ref 0.42–1.04)
ALPHA2 GLOB SERPL ELPH-MCNC: 9.3 % (ref 5.9–14.9)
BETA GLOB ABNORMAL SERPL ELPH-MCNC: 0.4 G/DL (ref 0.31–0.57)
BETA1 GLOB SERPL ELPH-MCNC: 6.2 % (ref 4.7–7.7)
BETA2 GLOB SERPL ELPH-MCNC: 4.1 % (ref 3.1–7.9)
BETA2+GAMMA GLOB SERPL ELPH-MCNC: 0.26 G/DL (ref 0.2–0.58)
GAMMA GLOB ABNORMAL SERPL ELPH-MCNC: 0.32 G/DL (ref 0.4–1.66)
GAMMA GLOB SERPL ELPH-MCNC: 5 % (ref 6.9–22.3)
IGG/ALB SER: 2.51 {RATIO} (ref 1.1–1.8)
INTERPRETATION UR IFE-IMP: NORMAL
M PROTEIN 1 MFR SERPL ELPH: 0.7 %
M PROTEIN 1 SERPL ELPH-MCNC: 0.04 G/DL
PROT PATTERN SERPL ELPH-IMP: ABNORMAL
PROT SERPL-MCNC: 6.4 G/DL (ref 6.4–8.2)

## 2023-08-24 PROCEDURE — 84165 PROTEIN E-PHORESIS SERUM: CPT | Performed by: STUDENT IN AN ORGANIZED HEALTH CARE EDUCATION/TRAINING PROGRAM

## 2023-08-24 PROCEDURE — 86334 IMMUNOFIX E-PHORESIS SERUM: CPT | Performed by: STUDENT IN AN ORGANIZED HEALTH CARE EDUCATION/TRAINING PROGRAM

## 2023-08-26 PROBLEM — Z12.5 SCREENING FOR PROSTATE CANCER: Status: RESOLVED | Noted: 2023-06-27 | Resolved: 2023-08-26

## 2023-08-26 PROBLEM — Z00.00 WELCOME TO MEDICARE PREVENTIVE VISIT: Status: RESOLVED | Noted: 2023-06-27 | Resolved: 2023-08-26

## 2023-08-30 ENCOUNTER — OFFICE VISIT (OUTPATIENT)
Dept: HEMATOLOGY ONCOLOGY | Facility: CLINIC | Age: 68
End: 2023-08-30
Payer: MEDICARE

## 2023-08-30 VITALS
WEIGHT: 150 LBS | SYSTOLIC BLOOD PRESSURE: 122 MMHG | DIASTOLIC BLOOD PRESSURE: 78 MMHG | HEART RATE: 72 BPM | RESPIRATION RATE: 17 BRPM | OXYGEN SATURATION: 97 % | BODY MASS INDEX: 24.11 KG/M2 | TEMPERATURE: 98 F | HEIGHT: 66 IN

## 2023-08-30 DIAGNOSIS — D72.820 MONOCLONAL B-CELL LYMPHOCYTOSIS: ICD-10-CM

## 2023-08-30 DIAGNOSIS — C88.0 WALDENSTROM'S MACROGLOBULINEMIA (HCC): Primary | ICD-10-CM

## 2023-08-30 PROCEDURE — 99213 OFFICE O/P EST LOW 20 MIN: CPT | Performed by: NURSE PRACTITIONER

## 2023-08-30 NOTE — PROGRESS NOTES
[facility] [place]  [address] [place], [place] [address] [phone]  Oncology Progress Note  Dayana Orozcor, 1955, [id number]  8/30/2023        Assessment/Plan:   1. Waldenstrom's macroglobulinemia ([address] )  2. Monoclonal B-cell lymphocytosis   Patient is a 74 yr. Old male with a history of Waldenström macroglobulinemia. He is on ibrutinib 281 mg p.o. daily tolerating without difficulty. CBC and CMP are within normal limits. SPEP shows a peak of 0.04 immunofixation identified IgM kappa quantitative immunoglobulins show an IgA of 35.0, IgG 308.0, IgM 52.0. Free light chains Ig lambda 3.3 kappa lambda ratio 3.0. These are all within his established range. Overall patient is able to function without restriction. We will continue to monitor labs every 3 months and see him again in 6 months. Patient verbalized understanding and is in agreement with plan. Goals and Barriers:    Current Goal:   Prolong Survival from Cancer. Barriers: None. Patient's Capacity to Self Care:  Patient is able to self care. [address] [place] [address]  ______________________________________________________________________________________________________________    Subjective     History of present illness/Cancer History:   Oncology History   Waldenstrom's macroglobulinemia ([address] )   6/8/2018 Biopsy    Overall, the combination of morphologic and immunophenotypic features is consistent with 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 with small cell size and overall low grade appearance. The morphologic features and nonspecific immunophenotype are most consistent with marginal zone lymphoma or lymphoplasmacytic lymphoma.  The presence of a monoclonal IgM expressing plasma cell population with expression of the same light chain as the malignant B-cell population in the setting of IgM monoclonal paraprotein may favor lymphoplasmacytic lymphoma, though is not definitively specific. MYD 88 +.     2018 Initial Diagnosis    Waldenstrom's macroglobulinemia [facility])  2018 patient had hernia repair. Leukocytosis was noted. Peripheral blood flow cytometry showed findings consistent with monoclonal B-cell lymphocytosis. Additionally, he was found to have a kappa lambda ratio of 36.0. IgM 3700 with reciprocal inhibition. Skeletal survey showed no lytic bone foci. 2018 -  Chemotherapy    Ibrutinib 280 mg p.o. daily. Lab Results   Component Value Date    WBC 6.93 2023    HGB 15.5 2023    HCT 45.5 2023    MCV 92 2023     2023     Lab Results   Component Value Date    SODIUM 139 2023    K 3.8 2023     2023    CO2 25 2023    AGAP 6 2023    BUN 9 2023    CREATININE 0.89 2023    GLUC 86 2023    GLUF 93 2023    CALCIUM 9.2 2023    AST 20 2023    ALT 27 2023    ALKPHOS 73 2023    TP 6.6 2023    TP 6.4 2023    TBILI 0.80 2023    EGFR 89 2023     Interval history:  Clinically stable     ECO - Asymptomatic    Review of Systems   Constitutional: Negative for activity change, appetite change, fatigue, fever and unexpected weight change. Respiratory: Negative for cough and shortness of breath. Cardiovascular: Negative for chest pain and leg swelling. Gastrointestinal: Negative for abdominal pain, constipation, diarrhea and nausea. Endocrine: Negative for cold intolerance and heat intolerance. Musculoskeletal: Negative for arthralgias and myalgias. Skin: Negative. Neurological: Negative for dizziness, weakness and headaches. Hematological: Negative for adenopathy. Does not bruise/bleed easily.        Current Outpatient Medications:   •  Acetaminophen 325 MG CAPS, Take by mouth as needed , Disp: , Rfl:   •  Imbruvica 140 MG CAPS, Take 2 capsules (280 mg total) by mouth once each day., Disp: 60 capsule, Rfl: 10  •  Multiple Vitamin (MULTI VITAMIN MENS PO), Take 1 tablet by mouth daily, Disp: , Rfl:   •  Red Yeast Rice Extract (RED YEAST RICE PO), Take by mouth 1 tab every other day, Disp: , Rfl:   No Known Allergies    Objective   /78 (BP Location: Left arm, Patient Position: Sitting, Cuff Size: Adult)   Pulse 72   Temp 98 °F (36.7 °C)   Resp 17   Ht 5' 6" (1.676 m)   Wt 68 kg (150 lb)   SpO2 97%   BMI 24.21 kg/m²   Wt Readings from Last 6 Encounters:   08/30/23 68 kg (150 lb)   06/27/23 68.7 kg (151 lb 6.4 oz)   03/06/23 71.2 kg (157 lb)   11/07/22 69.9 kg (154 lb)   08/04/22 67.9 kg (149 lb 9.6 oz)   06/21/22 69.9 kg (154 lb)     Physical Exam  Vitals reviewed. Constitutional:       Appearance: Normal appearance. He is well-developed. HENT:      Head: Normocephalic and atraumatic. Eyes:      Pupils: Pupils are equal, round, and reactive to light. Pulmonary:      Effort: Pulmonary effort is normal. No respiratory distress. Musculoskeletal:         General: Normal range of motion. Cervical back: Normal range of motion. Lymphadenopathy:      Cervical: No cervical adenopathy. Skin:     General: Skin is dry. Neurological:      Mental Status: He is alert and oriented to person, place, and time.    Psychiatric:         Behavior: Behavior normal.     The following historical data was reviewed:  Past Medical History:   Diagnosis Date   • Anemia, unspecified 4/30/2018   • COVID-19 03/17/2021   • Hyperglycemia     Last Assessed:1/18/18   • Inguinal hernia bilateral, non-recurrent 2/19/2018   • Respiratory tract infection due to COVID-19 virus 3/18/2021   • Respiratory tract infection due to COVID-19 virus 3/18/2021   • Rib pain on left side 5/1/2018   • Unilateral inguinal hernia without obstruction or gangrene 1/18/2018   • Waldenstrom's macroglobulinemia ([address]) 6/21/2018     Past Surgical History:   Procedure Laterality Date • WISDOM TOOTH EXTRACTION      Last Assessed:1/18/18     Please note: This report has been generated by a voice recognition software system. Therefore there may be syntax, spelling, and/or grammatical errors. Please call if you have any questions.

## 2023-09-20 ENCOUNTER — CLINICAL SUPPORT (OUTPATIENT)
Age: 68
End: 2023-09-20
Payer: COMMERCIAL

## 2023-09-20 DIAGNOSIS — Z23 NEED FOR INFLUENZA VACCINATION: Primary | ICD-10-CM

## 2023-09-20 PROCEDURE — G0008 ADMIN INFLUENZA VIRUS VAC: HCPCS

## 2023-09-20 PROCEDURE — 90662 IIV NO PRSV INCREASED AG IM: CPT

## 2023-11-09 ENCOUNTER — TELEPHONE (OUTPATIENT)
Dept: LAB | Facility: HOSPITAL | Age: 68
End: 2023-11-09

## 2023-11-30 ENCOUNTER — APPOINTMENT (OUTPATIENT)
Dept: LAB | Facility: HOSPITAL | Age: 68
End: 2023-11-30
Payer: COMMERCIAL

## 2023-11-30 LAB
ALBUMIN SERPL BCP-MCNC: 4.6 G/DL (ref 3.5–5)
ALP SERPL-CCNC: 66 U/L (ref 34–104)
ALT SERPL W P-5'-P-CCNC: 19 U/L (ref 7–52)
ANION GAP SERPL CALCULATED.3IONS-SCNC: 8 MMOL/L
AST SERPL W P-5'-P-CCNC: 22 U/L (ref 13–39)
BASOPHILS # BLD AUTO: 0.08 THOUSANDS/ÂΜL (ref 0–0.1)
BASOPHILS NFR BLD AUTO: 1 % (ref 0–1)
BILIRUB SERPL-MCNC: 1.05 MG/DL (ref 0.2–1)
BUN SERPL-MCNC: 13 MG/DL (ref 5–25)
CALCIUM SERPL-MCNC: 9.6 MG/DL (ref 8.4–10.2)
CHLORIDE SERPL-SCNC: 104 MMOL/L (ref 96–108)
CO2 SERPL-SCNC: 27 MMOL/L (ref 21–32)
CREAT SERPL-MCNC: 0.9 MG/DL (ref 0.6–1.3)
EOSINOPHIL # BLD AUTO: 0.24 THOUSAND/ÂΜL (ref 0–0.61)
EOSINOPHIL NFR BLD AUTO: 4 % (ref 0–6)
ERYTHROCYTE [DISTWIDTH] IN BLOOD BY AUTOMATED COUNT: 12.1 % (ref 11.6–15.1)
GFR SERPL CREATININE-BSD FRML MDRD: 88 ML/MIN/1.73SQ M
GLUCOSE P FAST SERPL-MCNC: 86 MG/DL (ref 65–99)
HCT VFR BLD AUTO: 47.1 % (ref 36.5–49.3)
HGB BLD-MCNC: 15.9 G/DL (ref 12–17)
IGA SERPL-MCNC: 36 MG/DL (ref 66–433)
IGG SERPL-MCNC: 284 MG/DL (ref 635–1741)
IGM SERPL-MCNC: 42 MG/DL (ref 45–281)
IMM GRANULOCYTES # BLD AUTO: 0.05 THOUSAND/UL (ref 0–0.2)
IMM GRANULOCYTES NFR BLD AUTO: 1 % (ref 0–2)
LYMPHOCYTES # BLD AUTO: 1.75 THOUSANDS/ÂΜL (ref 0.6–4.47)
LYMPHOCYTES NFR BLD AUTO: 26 % (ref 14–44)
MCH RBC QN AUTO: 30.8 PG (ref 26.8–34.3)
MCHC RBC AUTO-ENTMCNC: 33.8 G/DL (ref 31.4–37.4)
MCV RBC AUTO: 91 FL (ref 82–98)
MONOCYTES # BLD AUTO: 0.61 THOUSAND/ÂΜL (ref 0.17–1.22)
MONOCYTES NFR BLD AUTO: 9 % (ref 4–12)
NEUTROPHILS # BLD AUTO: 3.89 THOUSANDS/ÂΜL (ref 1.85–7.62)
NEUTS SEG NFR BLD AUTO: 59 % (ref 43–75)
NRBC BLD AUTO-RTO: 0 /100 WBCS
PLATELET # BLD AUTO: 189 THOUSANDS/UL (ref 149–390)
PMV BLD AUTO: 11.2 FL (ref 8.9–12.7)
POTASSIUM SERPL-SCNC: 4.1 MMOL/L (ref 3.5–5.3)
PROT SERPL-MCNC: 6.5 G/DL (ref 6.4–8.4)
RBC # BLD AUTO: 5.16 MILLION/UL (ref 3.88–5.62)
SODIUM SERPL-SCNC: 139 MMOL/L (ref 135–147)
WBC # BLD AUTO: 6.62 THOUSAND/UL (ref 4.31–10.16)

## 2023-12-01 LAB
ALBUMIN SERPL ELPH-MCNC: 4.43 G/DL (ref 3.2–5.1)
ALBUMIN SERPL ELPH-MCNC: 72.6 % (ref 48–70)
ALPHA1 GLOB SERPL ELPH-MCNC: 0.23 G/DL (ref 0.15–0.47)
ALPHA1 GLOB SERPL ELPH-MCNC: 3.8 % (ref 1.8–7)
ALPHA2 GLOB SERPL ELPH-MCNC: 0.52 G/DL (ref 0.42–1.04)
ALPHA2 GLOB SERPL ELPH-MCNC: 8.5 % (ref 5.9–14.9)
BETA GLOB ABNORMAL SERPL ELPH-MCNC: 0.38 G/DL (ref 0.31–0.57)
BETA1 GLOB SERPL ELPH-MCNC: 6.2 % (ref 4.7–7.7)
BETA2 GLOB SERPL ELPH-MCNC: 3.9 % (ref 3.1–7.9)
BETA2+GAMMA GLOB SERPL ELPH-MCNC: 0.24 G/DL (ref 0.2–0.58)
GAMMA GLOB ABNORMAL SERPL ELPH-MCNC: 0.31 G/DL (ref 0.4–1.66)
GAMMA GLOB SERPL ELPH-MCNC: 5 % (ref 6.9–22.3)
IGG/ALB SER: 2.65 {RATIO} (ref 1.1–1.8)
KAPPA LC FREE SER-MCNC: 9.3 MG/L (ref 3.3–19.4)
KAPPA LC FREE/LAMBDA FREE SER: 3.1 {RATIO} (ref 0.26–1.65)
LAMBDA LC FREE SERPL-MCNC: 3 MG/L (ref 5.7–26.3)
M PROTEIN 1 MFR SERPL ELPH: 0.9 %
M PROTEIN 1 SERPL ELPH-MCNC: 0.05 G/DL
PROT PATTERN SERPL ELPH-IMP: ABNORMAL
PROT SERPL-MCNC: 6.1 G/DL (ref 6.4–8.2)

## 2023-12-01 PROCEDURE — 84165 PROTEIN E-PHORESIS SERUM: CPT | Performed by: PATHOLOGY

## 2024-01-22 ENCOUNTER — TELEPHONE (OUTPATIENT)
Dept: LAB | Facility: HOSPITAL | Age: 69
End: 2024-01-22

## 2024-01-22 ENCOUNTER — TELEPHONE (OUTPATIENT)
Dept: HEMATOLOGY ONCOLOGY | Facility: CLINIC | Age: 69
End: 2024-01-22

## 2024-01-22 DIAGNOSIS — D72.820 MONOCLONAL B-CELL LYMPHOCYTOSIS: ICD-10-CM

## 2024-01-22 DIAGNOSIS — C88.0 WALDENSTROM'S MACROGLOBULINEMIA (HCC): Primary | ICD-10-CM

## 2024-01-22 NOTE — TELEPHONE ENCOUNTER
Appointment Confirmation   Who are you speaking with? Patient   If it is not the patient, are they listed on an active communication consent form? Yes   Which provider is the appointment scheduled with?  LAMONT Boyd   When is the appointment scheduled?  Please list date and time 2/21/24   At which location is the appointment scheduled to take place? [place]   Did caller verbalize understanding of appointment details? Yes     Lab Inquiry   Who are you speaking with? Patient     If it is not the patient, are they listed on an active communication consent form? Yes   Name of ordering provider LAMONT Boyd   What is being requested? Lab orders need to be entered   Lab draw location Other [facility]   What is the best call back number? [phone]

## 2024-01-22 NOTE — TELEPHONE ENCOUNTER
1/22 - scheduled for 2/12 - fasting bloodwork - also scheduled pt MR#[id number] for 3/12 - fasting bloodwork

## 2024-01-22 NOTE — TELEPHONE ENCOUNTER
Lab orders placed and faxed to [facility] phlebotomy at [phone]. Pt made aware and appreciated call

## 2024-02-12 ENCOUNTER — APPOINTMENT (OUTPATIENT)
Dept: LAB | Facility: HOSPITAL | Age: 69
End: 2024-02-12
Attending: INTERNAL MEDICINE
Payer: COMMERCIAL

## 2024-02-12 DIAGNOSIS — D72.820 MONOCLONAL B-CELL LYMPHOCYTOSIS: ICD-10-CM

## 2024-02-12 DIAGNOSIS — C88.0 WALDENSTROM'S MACROGLOBULINEMIA (HCC): ICD-10-CM

## 2024-02-12 LAB
ALBUMIN SERPL BCP-MCNC: 4.5 G/DL (ref 3.5–5)
ALP SERPL-CCNC: 61 U/L (ref 34–104)
ALT SERPL W P-5'-P-CCNC: 26 U/L (ref 7–52)
ANION GAP SERPL CALCULATED.3IONS-SCNC: 3 MMOL/L
AST SERPL W P-5'-P-CCNC: 21 U/L (ref 13–39)
BASOPHILS # BLD AUTO: 0.1 THOUSANDS/ÂΜL (ref 0–0.1)
BASOPHILS NFR BLD AUTO: 1 % (ref 0–1)
BILIRUB SERPL-MCNC: 0.96 MG/DL (ref 0.2–1)
BUN SERPL-MCNC: 12 MG/DL (ref 5–25)
CALCIUM SERPL-MCNC: 9.3 MG/DL (ref 8.4–10.2)
CHLORIDE SERPL-SCNC: 103 MMOL/L (ref 96–108)
CO2 SERPL-SCNC: 28 MMOL/L (ref 21–32)
CREAT SERPL-MCNC: 0.88 MG/DL (ref 0.6–1.3)
EOSINOPHIL # BLD AUTO: 0.29 THOUSAND/ÂΜL (ref 0–0.61)
EOSINOPHIL NFR BLD AUTO: 4 % (ref 0–6)
ERYTHROCYTE [DISTWIDTH] IN BLOOD BY AUTOMATED COUNT: 12.5 % (ref 11.6–15.1)
GFR SERPL CREATININE-BSD FRML MDRD: 89 ML/MIN/1.73SQ M
GLUCOSE P FAST SERPL-MCNC: 94 MG/DL (ref 65–99)
HCT VFR BLD AUTO: 46.9 % (ref 36.5–49.3)
HGB BLD-MCNC: 16.1 G/DL (ref 12–17)
IMM GRANULOCYTES # BLD AUTO: 0.14 THOUSAND/UL (ref 0–0.2)
IMM GRANULOCYTES NFR BLD AUTO: 2 % (ref 0–2)
LYMPHOCYTES # BLD AUTO: 2.1 THOUSANDS/ÂΜL (ref 0.6–4.47)
LYMPHOCYTES NFR BLD AUTO: 25 % (ref 14–44)
MCH RBC QN AUTO: 31 PG (ref 26.8–34.3)
MCHC RBC AUTO-ENTMCNC: 34.3 G/DL (ref 31.4–37.4)
MCV RBC AUTO: 90 FL (ref 82–98)
MONOCYTES # BLD AUTO: 0.81 THOUSAND/ÂΜL (ref 0.17–1.22)
MONOCYTES NFR BLD AUTO: 10 % (ref 4–12)
NEUTROPHILS # BLD AUTO: 4.9 THOUSANDS/ÂΜL (ref 1.85–7.62)
NEUTS SEG NFR BLD AUTO: 58 % (ref 43–75)
NRBC BLD AUTO-RTO: 0 /100 WBCS
PLATELET # BLD AUTO: 200 THOUSANDS/UL (ref 149–390)
PMV BLD AUTO: 11.2 FL (ref 8.9–12.7)
POTASSIUM SERPL-SCNC: 3.7 MMOL/L (ref 3.5–5.3)
PROT SERPL-MCNC: 6.6 G/DL (ref 6.4–8.4)
RBC # BLD AUTO: 5.2 MILLION/UL (ref 3.88–5.62)
SODIUM SERPL-SCNC: 134 MMOL/L (ref 135–147)
WBC # BLD AUTO: 8.34 THOUSAND/UL (ref 4.31–10.16)

## 2024-02-12 PROCEDURE — 85025 COMPLETE CBC W/AUTO DIFF WBC: CPT

## 2024-02-12 PROCEDURE — 84165 PROTEIN E-PHORESIS SERUM: CPT

## 2024-02-12 PROCEDURE — 36415 COLL VENOUS BLD VENIPUNCTURE: CPT

## 2024-02-12 PROCEDURE — 83521 IG LIGHT CHAINS FREE EACH: CPT

## 2024-02-12 PROCEDURE — 80053 COMPREHEN METABOLIC PANEL: CPT

## 2024-02-13 LAB
ALBUMIN SERPL ELPH-MCNC: 4.35 G/DL (ref 3.2–5.1)
ALBUMIN SERPL ELPH-MCNC: 72.5 % (ref 48–70)
ALPHA1 GLOB SERPL ELPH-MCNC: 0.23 G/DL (ref 0.15–0.47)
ALPHA1 GLOB SERPL ELPH-MCNC: 3.8 % (ref 1.8–7)
ALPHA2 GLOB SERPL ELPH-MCNC: 0.51 G/DL (ref 0.42–1.04)
ALPHA2 GLOB SERPL ELPH-MCNC: 8.5 % (ref 5.9–14.9)
BETA GLOB ABNORMAL SERPL ELPH-MCNC: 0.38 G/DL (ref 0.31–0.57)
BETA1 GLOB SERPL ELPH-MCNC: 6.3 % (ref 4.7–7.7)
BETA2 GLOB SERPL ELPH-MCNC: 4 % (ref 3.1–7.9)
BETA2+GAMMA GLOB SERPL ELPH-MCNC: 0.24 G/DL (ref 0.2–0.58)
GAMMA GLOB ABNORMAL SERPL ELPH-MCNC: 0.29 G/DL (ref 0.4–1.66)
GAMMA GLOB SERPL ELPH-MCNC: 4.9 % (ref 6.9–22.3)
IGG/ALB SER: 2.64 {RATIO} (ref 1.1–1.8)
KAPPA LC FREE SER-MCNC: 10.3 MG/L (ref 3.3–19.4)
KAPPA LC FREE/LAMBDA FREE SER: 2.64 {RATIO} (ref 0.26–1.65)
LAMBDA LC FREE SERPL-MCNC: 3.9 MG/L (ref 5.7–26.3)
M PROTEIN 1 MFR SERPL ELPH: 1.5 %
M PROTEIN 1 SERPL ELPH-MCNC: 0.09 G/DL
PROT PATTERN SERPL ELPH-IMP: ABNORMAL
PROT SERPL-MCNC: 6 G/DL (ref 6.4–8.2)

## 2024-02-13 PROCEDURE — 84165 PROTEIN E-PHORESIS SERUM: CPT | Performed by: PATHOLOGY

## 2024-02-21 ENCOUNTER — OFFICE VISIT (OUTPATIENT)
Dept: HEMATOLOGY ONCOLOGY | Facility: CLINIC | Age: 69
End: 2024-02-21
Payer: COMMERCIAL

## 2024-02-21 VITALS
SYSTOLIC BLOOD PRESSURE: 126 MMHG | RESPIRATION RATE: 17 BRPM | BODY MASS INDEX: 24.75 KG/M2 | DIASTOLIC BLOOD PRESSURE: 74 MMHG | HEIGHT: 66 IN | TEMPERATURE: 97.2 F | WEIGHT: 154 LBS

## 2024-02-21 DIAGNOSIS — C88.0 WALDENSTROM'S MACROGLOBULINEMIA (HCC): Primary | ICD-10-CM

## 2024-02-21 PROCEDURE — 99213 OFFICE O/P EST LOW 20 MIN: CPT | Performed by: NURSE PRACTITIONER

## 2024-02-21 NOTE — PROGRESS NOTES
[facility] [place]  [address] [facility] [address]  [phone]  Oncology Progress Note  Jamie Murry, 1955, [id number]  2/21/2024        Assessment/Plan:   1. Waldenstrom's macroglobulinemia (HCC)   Patient is a 68 yr. Old male with a history of Waldenström macroglobulinemia.  He is on ibrutinib 281 mg p.o. daily tolerating without difficulty.  Labs from 2/12/2024 show a normal CBC with differential, metabolic panel essentially stable, sodium level 134, SPEP shows a monoclonal peak of 0.09 previously identified as IgM kappa free light chains show an IgG lambda chain 3.9 normal IgA kappa at 10.3, ratio 2.64.  This is within his established range.  Previous immunoglobulins in November showed a decreased in all 3, IgA 36, IgG 284, IgM 42.  Overall patient is able to function without restriction.  He denies symptoms of infection.  We will continue monitoring labs every 3 months and see him in 6 months.  Patient verbalized understanding and is in agreement with the plan.    - CBC and differential; Standing  - Comprehensive metabolic panel; Standing  - IgG, IgA, IgM; Standing  - Immunoglobulin free LT chains blood; Standing  - Protein electrophoresis, serum; Standing    Goals and Barriers:    Current Goal:   Prolong Survival from Cancer.     Barriers: None.      Patient's Capacity to Self Care:  Patient is able to self care.    Kaci MIGUEL  Medical Oncology/Hematology  [facility]  ______________________________________________________________________________________________________________    Subjective     History of present illness/Cancer History:   Oncology History   Waldenstrom's macroglobulinemia (HCC)   6/8/2018 Biopsy    Overall, the combination of morphologic and immunophenotypic features is consistent with 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 with small cell size and overall low grade  appearance. The morphologic features and nonspecific immunophenotype are most consistent with marginal zone lymphoma or lymphoplasmacytic lymphoma. The presence of a monoclonal IgM expressing plasma cell population with expression of the same light chain as the malignant B-cell population in the setting of IgM monoclonal paraprotein may favor lymphoplasmacytic lymphoma, though is not definitively specific.   MYD 88 +.     2018 Initial Diagnosis    Waldenstrom's macroglobulinemia (HCC)  2018 patient had hernia repair.  Leukocytosis was noted.  Peripheral blood flow cytometry showed findings consistent with monoclonal B-cell lymphocytosis.  Additionally, he was found to have a kappa lambda ratio of 36.0.  IgM 3700 with reciprocal inhibition.  Skeletal survey showed no lytic bone foci.     2018 -  Chemotherapy    Ibrutinib 280 mg p.o. daily.          Lab Results   Component Value Date    WBC 8.34 2024    HGB 16.1 2024    HCT 46.9 2024    MCV 90 2024     2024     Lab Results   Component Value Date    SODIUM 134 (L) 2024    K 3.7 2024     2024    CO2 28 2024    AGAP 3 2024    BUN 12 2024    CREATININE 0.88 2024    GLUC 86 2023    GLUF 94 2024    CALCIUM 9.3 2024    AST 21 2024    ALT 26 2024    ALKPHOS 61 2024    TP 6.6 2024    TP 6.0 (L) 2024    TBILI 0.96 2024    EGFR 89 2024     Interval history:  clinically stable     ECO - Asymptomatic    Review of Systems   Constitutional:  Negative for activity change, appetite change, fatigue, fever and unexpected weight change.   Respiratory:  Negative for cough and shortness of breath.    Cardiovascular:  Negative for chest pain and leg swelling.   Gastrointestinal:  Negative for abdominal pain, constipation, diarrhea and nausea.   Endocrine: Negative for cold intolerance and heat intolerance.   Musculoskeletal:   "Negative for arthralgias and myalgias.   Skin: Negative.    Neurological:  Negative for dizziness, weakness and headaches.   Hematological:  Negative for adenopathy. Does not bruise/bleed easily.     Current Outpatient Medications:     Acetaminophen 325 MG CAPS, Take by mouth as needed , Disp: , Rfl:     Imbruvica 140 MG CAPS, Take 2 capsules (280 mg total) by mouth once each day., Disp: 60 capsule, Rfl: 10    Multiple Vitamin (MULTI VITAMIN MENS PO), Take 1 tablet by mouth daily, Disp: , Rfl:     Red Yeast Rice Extract (RED YEAST RICE PO), Take by mouth 1 tab every other day, Disp: , Rfl:   No Known Allergies    Objective   /74 (BP Location: Right arm, Patient Position: Sitting, Cuff Size: Adult)   Temp (!) 97.2 °F (36.2 °C)   Resp 17   Ht 5' 6\" (1.676 m)   Wt 69.9 kg (154 lb)   BMI 24.86 kg/m²   Wt Readings from Last 6 Encounters:   02/21/24 69.9 kg (154 lb)   08/30/23 68 kg (150 lb)   06/27/23 68.7 kg (151 lb 6.4 oz)   03/06/23 71.2 kg (157 lb)   11/07/22 69.9 kg (154 lb)   08/04/22 67.9 kg (149 lb 9.6 oz)     Physical Exam  Vitals reviewed.   Constitutional:       Appearance: Normal appearance. He is well-developed.   HENT:      Head: Normocephalic and atraumatic.   Eyes:      Pupils: Pupils are equal, round, and reactive to light.   Pulmonary:      Effort: Pulmonary effort is normal. No respiratory distress.   Musculoskeletal:         General: Normal range of motion.      Cervical back: Normal range of motion.   Lymphadenopathy:      Cervical: No cervical adenopathy.   Skin:     General: Skin is dry.   Neurological:      Mental Status: He is alert and oriented to person, place, and time.   Psychiatric:         Behavior: Behavior normal.     The following historical data was reviewed:  Past Medical History:   Diagnosis Date    Anemia, unspecified 4/30/2018    COVID-19 03/17/2021    Hyperglycemia     Last Assessed:1/18/18    Inguinal hernia bilateral, non-recurrent 2/19/2018    Respiratory tract " infection due to COVID-19 virus 3/18/2021    Respiratory tract infection due to COVID-19 virus 3/18/2021    Rib pain on left side 5/1/2018    Unilateral inguinal hernia without obstruction or gangrene 1/18/2018    Waldenstrom's macroglobulinemia (HCC) 6/21/2018     Past Surgical History:   Procedure Laterality Date    WISDOM TOOTH EXTRACTION      Last Assessed:1/18/18     Please note:  This report has been generated by a voice recognition software system. Therefore there may be syntax, spelling, and/or grammatical errors. Please call if you have any questions.

## 2024-03-29 ENCOUNTER — RA CDI HCC (OUTPATIENT)
Dept: OTHER | Facility: HOSPITAL | Age: 69
End: 2024-03-29

## 2024-04-01 ENCOUNTER — OFFICE VISIT (OUTPATIENT)
Age: 69
End: 2024-04-01
Payer: COMMERCIAL

## 2024-04-01 VITALS
HEART RATE: 70 BPM | DIASTOLIC BLOOD PRESSURE: 68 MMHG | TEMPERATURE: 98 F | OXYGEN SATURATION: 97 % | HEIGHT: 67 IN | BODY MASS INDEX: 24.58 KG/M2 | WEIGHT: 156.6 LBS | SYSTOLIC BLOOD PRESSURE: 112 MMHG

## 2024-04-01 DIAGNOSIS — J01.90 ACUTE SINUSITIS, RECURRENCE NOT SPECIFIED, UNSPECIFIED LOCATION: Primary | ICD-10-CM

## 2024-04-01 PROCEDURE — G2211 COMPLEX E/M VISIT ADD ON: HCPCS | Performed by: INTERNAL MEDICINE

## 2024-04-01 PROCEDURE — 99213 OFFICE O/P EST LOW 20 MIN: CPT | Performed by: INTERNAL MEDICINE

## 2024-04-01 RX ORDER — AMOXICILLIN 875 MG/1
875 TABLET, COATED ORAL 2 TIMES DAILY
Qty: 20 TABLET | Refills: 0 | Status: SHIPPED | OUTPATIENT
Start: 2024-04-01 | End: 2024-04-11

## 2024-04-01 RX ORDER — AZELASTINE 1 MG/ML
1 SPRAY, METERED NASAL 2 TIMES DAILY
Qty: 30 ML | Refills: 0 | Status: SHIPPED | OUTPATIENT
Start: 2024-04-01

## 2024-04-01 NOTE — PROGRESS NOTES
Name: Jamie Murry      : 1955      MRN: [id number]  Encounter Provider: Leobardo Sharma MD  Encounter Date: 2024   Encounter department: [facility]    Assessment & Plan     1. Acute sinusitis, recurrence not specified, unspecified location  Assessment & Plan:  Will initiate amoxicillin twice daily for 2 weeks along with azelastine nasal spray twice daily.  Instructed the patient to contact the office should he develop any sinus pain, fever or worsening headache, earache or sore throat    Orders:  -     amoxicillin (AMOXIL) 875 mg tablet; Take 1 tablet (875 mg total) by mouth 2 (two) times a day for 10 days  -     azelastine (ASTELIN) 0.1 % nasal spray; 1 spray into each nostril 2 (two) times a day Use in each nostril as directed           Subjective      The patient presents to the office with symptoms of  a sore throat x 1 week.  He also notes yellow-greenish mucus on the back of his throat.  His sinus and mouth are dry.  Denies any headache, sinus pain or sinus discomfort.  He denies any earache or difficulty hearing.  He has had no cough or congestion.      Review of Systems   Constitutional: Negative.    HENT:  Positive for congestion, postnasal drip, sinus pressure and sore throat. Negative for ear discharge, ear pain, facial swelling, mouth sores, nosebleeds, tinnitus and trouble swallowing.    Eyes: Negative.    Respiratory: Negative.     Cardiovascular: Negative.    Gastrointestinal: Negative.    Endocrine: Negative.    Genitourinary: Negative.    Musculoskeletal: Negative.        Current Outpatient Medications on File Prior to Visit   Medication Sig    Acetaminophen 325 MG CAPS Take by mouth as needed     Imbruvica 140 MG CAPS Take 2 capsules (280 mg total) by mouth once each day.    Multiple Vitamin (MULTI VITAMIN MENS PO) Take 1 tablet by mouth daily    [DISCONTINUED] Red Yeast Rice Extract (RED YEAST RICE PO) Take by mouth 1 tab every other day  "      Objective     /68 (BP Location: Left arm, Patient Position: Sitting, Cuff Size: Standard)   Pulse 70   Temp 98 °F (36.7 °C) (Temporal)   Ht 5' 6.93\" (1.7 m)   Wt 71 kg (156 lb 9.6 oz)   SpO2 97%   BMI 24.58 kg/m²     Physical Exam  Vitals reviewed.   Constitutional:       General: He is not in acute distress.     Appearance: He is well-developed and normal weight. He is not ill-appearing, toxic-appearing or diaphoretic.   HENT:      Head: Normocephalic and atraumatic.      Right Ear: Tympanic membrane and ear canal normal. No drainage, swelling or tenderness. No middle ear effusion. Tympanic membrane is not erythematous.      Left Ear: Tympanic membrane and ear canal normal. No drainage, swelling or tenderness.  No middle ear effusion. Tympanic membrane is not erythematous.      Nose: Congestion present.      Mouth/Throat:      Mouth: Mucous membranes are dry. No oral lesions.      Pharynx: Oropharyngeal exudate (Abundance of postnasal secretions noted in the posterior pharynx) present.      Tonsils: No tonsillar exudate or tonsillar abscesses. 0 on the right. 0 on the left.   Eyes:      Conjunctiva/sclera: Conjunctivae normal.      Pupils: Pupils are equal, round, and reactive to light.   Neck:      Thyroid: No thyromegaly.   Cardiovascular:      Rate and Rhythm: Normal rate and regular rhythm.      Heart sounds: Normal heart sounds.   Pulmonary:      Effort: Pulmonary effort is normal. No respiratory distress.      Breath sounds: No wheezing, rhonchi or rales.   Abdominal:      General: There is no distension.      Palpations: Abdomen is soft.      Tenderness: There is no abdominal tenderness.   Musculoskeletal:      Cervical back: Normal range of motion.   Lymphadenopathy:      Cervical: No cervical adenopathy.   Skin:     General: Skin is warm and dry.      Coloration: Skin is not pale.      Findings: No erythema or rash.   Neurological:      General: No focal deficit present.      Mental " Status: He is alert and oriented to person, place, and time.   Psychiatric:         Mood and Affect: Mood normal.         Behavior: Behavior normal.       Leobardo Sharma MD

## 2024-04-02 NOTE — ASSESSMENT & PLAN NOTE
Will initiate amoxicillin twice daily for 2 weeks along with azelastine nasal spray twice daily.  Instructed the patient to contact the office should he develop any sinus pain, fever or worsening headache, earache or sore throat

## 2024-04-23 DIAGNOSIS — J01.90 ACUTE SINUSITIS, RECURRENCE NOT SPECIFIED, UNSPECIFIED LOCATION: ICD-10-CM

## 2024-04-24 RX ORDER — AZELASTINE HYDROCHLORIDE 137 UG/1
SPRAY, METERED NASAL
Qty: 90 ML | Refills: 1 | Status: SHIPPED | OUTPATIENT
Start: 2024-04-24

## 2024-05-01 PROBLEM — J01.90 ACUTE SINUSITIS: Status: RESOLVED | Noted: 2024-04-01 | Resolved: 2024-05-01

## 2024-05-06 ENCOUNTER — TELEPHONE (OUTPATIENT)
Dept: LAB | Facility: HOSPITAL | Age: 69
End: 2024-05-06

## 2024-05-09 ENCOUNTER — TELEPHONE (OUTPATIENT)
Dept: LAB | Facility: HOSPITAL | Age: 69
End: 2024-05-09

## 2024-05-20 ENCOUNTER — APPOINTMENT (OUTPATIENT)
Dept: LAB | Facility: HOSPITAL | Age: 69
End: 2024-05-20
Payer: COMMERCIAL

## 2024-05-20 DIAGNOSIS — C88.0 WALDENSTROM'S MACROGLOBULINEMIA (HCC): ICD-10-CM

## 2024-05-20 LAB
ALBUMIN SERPL BCP-MCNC: 4.6 G/DL (ref 3.5–5)
ALP SERPL-CCNC: 65 U/L (ref 34–104)
ALT SERPL W P-5'-P-CCNC: 19 U/L (ref 7–52)
ANION GAP SERPL CALCULATED.3IONS-SCNC: 8 MMOL/L (ref 4–13)
AST SERPL W P-5'-P-CCNC: 21 U/L (ref 13–39)
BASOPHILS # BLD AUTO: 0.08 THOUSANDS/ÂΜL (ref 0–0.1)
BASOPHILS NFR BLD AUTO: 1 % (ref 0–1)
BILIRUB SERPL-MCNC: 0.98 MG/DL (ref 0.2–1)
BUN SERPL-MCNC: 15 MG/DL (ref 5–25)
CALCIUM SERPL-MCNC: 9 MG/DL (ref 8.4–10.2)
CHLORIDE SERPL-SCNC: 102 MMOL/L (ref 96–108)
CO2 SERPL-SCNC: 26 MMOL/L (ref 21–32)
CREAT SERPL-MCNC: 0.92 MG/DL (ref 0.6–1.3)
EOSINOPHIL # BLD AUTO: 0.15 THOUSAND/ÂΜL (ref 0–0.61)
EOSINOPHIL NFR BLD AUTO: 2 % (ref 0–6)
ERYTHROCYTE [DISTWIDTH] IN BLOOD BY AUTOMATED COUNT: 12.5 % (ref 11.6–15.1)
GFR SERPL CREATININE-BSD FRML MDRD: 86 ML/MIN/1.73SQ M
GLUCOSE P FAST SERPL-MCNC: 84 MG/DL (ref 65–99)
HCT VFR BLD AUTO: 45.5 % (ref 36.5–49.3)
HGB BLD-MCNC: 15.4 G/DL (ref 12–17)
IGA SERPL-MCNC: 53 MG/DL (ref 66–433)
IGG SERPL-MCNC: 316 MG/DL (ref 635–1741)
IGM SERPL-MCNC: 40 MG/DL (ref 45–281)
IMM GRANULOCYTES # BLD AUTO: 0.07 THOUSAND/UL (ref 0–0.2)
IMM GRANULOCYTES NFR BLD AUTO: 1 % (ref 0–2)
LYMPHOCYTES # BLD AUTO: 1.92 THOUSANDS/ÂΜL (ref 0.6–4.47)
LYMPHOCYTES NFR BLD AUTO: 28 % (ref 14–44)
MCH RBC QN AUTO: 30.9 PG (ref 26.8–34.3)
MCHC RBC AUTO-ENTMCNC: 33.8 G/DL (ref 31.4–37.4)
MCV RBC AUTO: 91 FL (ref 82–98)
MONOCYTES # BLD AUTO: 0.7 THOUSAND/ÂΜL (ref 0.17–1.22)
MONOCYTES NFR BLD AUTO: 10 % (ref 4–12)
NEUTROPHILS # BLD AUTO: 3.86 THOUSANDS/ÂΜL (ref 1.85–7.62)
NEUTS SEG NFR BLD AUTO: 58 % (ref 43–75)
NRBC BLD AUTO-RTO: 0 /100 WBCS
PLATELET # BLD AUTO: 198 THOUSANDS/UL (ref 149–390)
PMV BLD AUTO: 11.3 FL (ref 8.9–12.7)
POTASSIUM SERPL-SCNC: 3.7 MMOL/L (ref 3.5–5.3)
PROT SERPL-MCNC: 6.5 G/DL (ref 6.4–8.4)
RBC # BLD AUTO: 4.99 MILLION/UL (ref 3.88–5.62)
SODIUM SERPL-SCNC: 136 MMOL/L (ref 135–147)
WBC # BLD AUTO: 6.78 THOUSAND/UL (ref 4.31–10.16)

## 2024-05-20 PROCEDURE — 84165 PROTEIN E-PHORESIS SERUM: CPT

## 2024-05-20 PROCEDURE — 82784 ASSAY IGA/IGD/IGG/IGM EACH: CPT

## 2024-05-20 PROCEDURE — 80053 COMPREHEN METABOLIC PANEL: CPT

## 2024-05-20 PROCEDURE — 36415 COLL VENOUS BLD VENIPUNCTURE: CPT

## 2024-05-20 PROCEDURE — 83521 IG LIGHT CHAINS FREE EACH: CPT

## 2024-05-20 PROCEDURE — 85025 COMPLETE CBC W/AUTO DIFF WBC: CPT

## 2024-05-21 LAB
ALBUMIN SERPL ELPH-MCNC: 4.51 G/DL (ref 3.2–5.1)
ALBUMIN SERPL ELPH-MCNC: 72.7 % (ref 48–70)
ALPHA1 GLOB SERPL ELPH-MCNC: 0.22 G/DL (ref 0.15–0.47)
ALPHA1 GLOB SERPL ELPH-MCNC: 3.6 % (ref 1.8–7)
ALPHA2 GLOB SERPL ELPH-MCNC: 0.55 G/DL (ref 0.42–1.04)
ALPHA2 GLOB SERPL ELPH-MCNC: 8.8 % (ref 5.9–14.9)
BETA GLOB ABNORMAL SERPL ELPH-MCNC: 0.38 G/DL (ref 0.31–0.57)
BETA1 GLOB SERPL ELPH-MCNC: 6.1 % (ref 4.7–7.7)
BETA2 GLOB SERPL ELPH-MCNC: 4 % (ref 3.1–7.9)
BETA2+GAMMA GLOB SERPL ELPH-MCNC: 0.25 G/DL (ref 0.2–0.58)
GAMMA GLOB ABNORMAL SERPL ELPH-MCNC: 0.3 G/DL (ref 0.4–1.66)
GAMMA GLOB SERPL ELPH-MCNC: 4.8 % (ref 6.9–22.3)
IGG/ALB SER: 2.66 {RATIO} (ref 1.1–1.8)
KAPPA LC FREE SER-MCNC: 8 MG/L (ref 3.3–19.4)
KAPPA LC FREE/LAMBDA FREE SER: 2.42 {RATIO} (ref 0.26–1.65)
LAMBDA LC FREE SERPL-MCNC: 3.3 MG/L (ref 5.7–26.3)
M PROTEIN 1 MFR SERPL ELPH: 0.9 %
M PROTEIN 1 SERPL ELPH-MCNC: 0.06 G/DL
PROT PATTERN SERPL ELPH-IMP: ABNORMAL
PROT SERPL-MCNC: 6.2 G/DL (ref 6.4–8.2)

## 2024-05-21 PROCEDURE — 84165 PROTEIN E-PHORESIS SERUM: CPT | Performed by: PATHOLOGY

## 2024-06-04 DIAGNOSIS — C88.0 WALDENSTROM'S MACROGLOBULINEMIA (HCC): ICD-10-CM

## 2024-06-04 RX ORDER — IBRUTINIB 140 MG/1
CAPSULE ORAL
Qty: 60 CAPSULE | Refills: 10 | Status: SHIPPED | OUTPATIENT
Start: 2024-06-04

## 2024-06-21 ENCOUNTER — RA CDI HCC (OUTPATIENT)
Dept: OTHER | Facility: HOSPITAL | Age: 69
End: 2024-06-21

## 2024-06-28 ENCOUNTER — OFFICE VISIT (OUTPATIENT)
Age: 69
End: 2024-06-28
Payer: COMMERCIAL

## 2024-06-28 VITALS
HEIGHT: 67 IN | HEART RATE: 75 BPM | DIASTOLIC BLOOD PRESSURE: 72 MMHG | WEIGHT: 150.6 LBS | TEMPERATURE: 98.7 F | BODY MASS INDEX: 23.64 KG/M2 | OXYGEN SATURATION: 98 % | SYSTOLIC BLOOD PRESSURE: 116 MMHG

## 2024-06-28 DIAGNOSIS — Z00.00 MEDICARE ANNUAL WELLNESS VISIT, SUBSEQUENT: Primary | ICD-10-CM

## 2024-06-28 PROCEDURE — G0439 PPPS, SUBSEQ VISIT: HCPCS | Performed by: INTERNAL MEDICINE

## 2024-06-28 NOTE — PROGRESS NOTES
Ambulatory Visit  Name: Jamie Murry      : 1955      MRN: [id number]  Encounter Provider: Leobardo Sharma MD  Encounter Date: 2024   Encounter department: [facility] [place]    Assessment & Plan   1. Medicare annual wellness visit, subsequent  Assessment & Plan:  Patient is doing well, responding to treatment. Up to date with age appropriate screenings and immunizations.         Preventive health issues were discussed with patient, and age appropriate screening tests were ordered as noted in patient's After Visit Summary. Personalized health advice and appropriate referrals for health education or preventive services given if needed, as noted in patient's After Visit Summary.    History of Present Illness     HPI   Patient Care Team:  Leobardo Sharma MD as PCP - General (Internal Medicine)  Leobardo Sharma MD as PCP - PCP-[place] ([facility])  Magdiel Del Toro MD as PCP - PCP-[facility] ([facility])  Steven Torrez DO as Medical Oncologist (Hematology and Oncology)    Review of Systems  Medical History Reviewed by provider this encounter:       Annual Wellness Visit Questionnaire   Jamie is here for his Subsequent Wellness visit.     Health Risk Assessment:   Patient rates overall health as very good. Patient feels that their physical health rating is same. Patient is very satisfied with their life. Eyesight was rated as same. Hearing was rated as same. Patient feels that their emotional and mental health rating is slightly better. Patients states they are never, rarely angry. Patient states they are sometimes unusually tired/fatigued. Pain experienced in the last 7 days has been none. Patient states that he has experienced no weight loss or gain in last 6 months.     Depression Screening:   PHQ-2 Score: 0      Fall Risk Screening:   In the past year, patient has experienced: no history of falling in past year      Home Safety:  Patient does not  have trouble with stairs inside or outside of their home. Patient has working smoke alarms and has working carbon monoxide detector. Home safety hazards include: none.     Nutrition:   Current diet is Regular and Limited junk food.     Medications:   Patient is currently taking over-the-counter supplements. OTC medications include: see medication list. Patient is able to manage medications.     Activities of Daily Living (ADLs)/Instrumental Activities of Daily Living (IADLs):   Walk and transfer into and out of bed and chair?: Yes  Dress and groom yourself?: Yes    Bathe or shower yourself?: Yes    Feed yourself? Yes  Do your laundry/housekeeping?: Yes  Manage your money, pay your bills and track your expenses?: Yes  Make your own meals?: Yes    Do your own shopping?: Yes    Previous Hospitalizations:   Any hospitalizations or ED visits within the last 12 months?: No      Advance Care Planning:   Living will: No    Durable POA for healthcare: No    Advanced directive: No    Advanced directive counseling given: No    Five wishes given: No    Patient declined ACP directive: Yes    End of Life Decisions reviewed with patient: Yes    Provider agrees with end of life decisions: Yes      Cognitive Screening:   Provider or family/friend/caregiver concerned regarding cognition?: No    PREVENTIVE SCREENINGS      Cardiovascular Screening:    General: Screening Current      Diabetes Screening:     General: Screening Current      Colorectal Cancer Screening:     General: Screening Current      Prostate Cancer Screening:    General: Screening Current      Osteoporosis Screening:    General: Screening Not Indicated      Abdominal Aortic Aneurysm (AAA) Screening:    Risk factors include: age between 65-74 yo        Lung Cancer Screening:     General: Screening Not Indicated      Hepatitis C Screening:    General: Screening Current    Screening, Brief Intervention, and Referral to Treatment (SBIRT)    Screening  Typical number of  "drinks in a day: 0  Typical number of drinks in a week: 0  Interpretation: Low risk drinking behavior.    Social Determinants of Health     Financial Resource Strain: Low Risk  (6/27/2023)    Overall Financial Resource Strain (CARDIA)     Difficulty of Paying Living Expenses: Not hard at all   Food Insecurity: No Food Insecurity (6/28/2024)    Hunger Vital Sign     Worried About Running Out of Food in the Last Year: Never true     Ran Out of Food in the Last Year: Never true   Transportation Needs: No Transportation Needs (6/28/2024)    PRAPARE - Transportation     Lack of Transportation (Medical): No     Lack of Transportation (Non-Medical): No   Housing Stability: Low Risk  (6/28/2024)    Housing Stability Vital Sign     Unable to Pay for Housing in the Last Year: No     Number of Times Moved in the Last Year: 0     Homeless in the Last Year: No   Utilities: Not At Risk (6/28/2024)    [facility] Utilities     Threatened with loss of utilities: No     No results found.    Objective     /72 (BP Location: Left arm, Patient Position: Sitting, Cuff Size: Standard)   Pulse 75   Temp 98.7 °F (37.1 °C) (Temporal)   Ht 5' 7.01\" (1.702 m)   Wt 68.3 kg (150 lb 9.6 oz)   SpO2 98%   BMI 23.58 kg/m²     Physical Exam  Administrative Statements           "

## 2024-06-28 NOTE — ASSESSMENT & PLAN NOTE
Patient is doing well, responding to treatment. Up to date with age appropriate screenings and immunizations.

## 2024-06-28 NOTE — PATIENT INSTRUCTIONS
Medicare Preventive Visit Patient Instructions  Thank you for completing your Welcome to Medicare Visit or Medicare Annual Wellness Visit today. Your next wellness visit will be due in one year (6/29/2025).  The screening/preventive services that you may require over the next 5-10 years are detailed below. Some tests may not apply to you based off risk factors and/or age. Screening tests ordered at today's visit but not completed yet may show as past due. Also, please note that scanned in results may not display below.  Preventive Screenings:  Service Recommendations Previous Testing/Comments   Colorectal Cancer Screening  Colonoscopy    Fecal Occult Blood Test (FOBT)/Fecal Immunochemical Test (FIT)  Fecal DNA/Cologuard Test  Flexible Sigmoidoscopy Age: 45-75 years old   Colonoscopy: every 10 years (May be performed more frequently if at higher risk)  OR  FOBT/FIT: every 1 year  OR  Cologuard: every 3 years  OR  Sigmoidoscopy: every 5 years  Screening may be recommended earlier than age 45 if at higher risk for colorectal cancer. Also, an individualized decision between you and your healthcare provider will decide whether screening between the ages of 76-85 would be appropriate. Colonoscopy: 05/23/2018  FOBT/FIT: Not on file  Cologuard: 06/30/2022  Sigmoidoscopy: Not on file    Screening Current     Prostate Cancer Screening Individualized decision between patient and health care provider in men between ages of 55-69   Medicare will cover every 12 months beginning on the day after your 50th birthday PSA: 0.15 ng/mL     Screening Current     Hepatitis C Screening Once for adults born between 1945 and 1965  More frequently in patients at high risk for Hepatitis C Hep C Antibody: 04/01/2021    Screening Current   Diabetes Screening 1-2 times per year if you're at risk for diabetes or have pre-diabetes Fasting glucose: 84 mg/dL (5/20/2024)  A1C: No results in last 5 years (No results in last 5 years)  Screening Current    Cholesterol Screening Once every 5 years if you don't have a lipid disorder. May order more often based on risk factors. Lipid panel: 07/12/2023  Screening Current      Other Preventive Screenings Covered by Medicare:  Abdominal Aortic Aneurysm (AAA) Screening: covered once if your at risk. You're considered to be at risk if you have a family history of AAA or a male between the age of 65-75 who smoking at least 100 cigarettes in your lifetime.  Lung Cancer Screening: covers low dose CT scan once per year if you meet all of the following conditions: (1) Age 55-77; (2) No signs or symptoms of lung cancer; (3) Current smoker or have quit smoking within the last 15 years; (4) You have a tobacco smoking history of at least 20 pack years (packs per day x number of years you smoked); (5) You get a written order from a healthcare provider.  Glaucoma Screening: covered annually if you're considered high risk: (1) You have diabetes OR (2) Family history of glaucoma OR (3)  aged 50 and older OR (4)  American aged 65 and older  Osteoporosis Screening: covered every 2 years if you meet one of the following conditions: (1) Have a vertebral abnormality; (2) On glucocorticoid therapy for more than 3 months; (3) Have primary hyperparathyroidism; (4) On osteoporosis medications and need to assess response to drug therapy.  HIV Screening: covered annually if you're between the age of 15-65. Also covered annually if you are younger than 15 and older than 65 with risk factors for HIV infection. For pregnant patients, it is covered up to 3 times per pregnancy.    Immunizations:  Immunization Recommendations   Influenza Vaccine Annual influenza vaccination during flu season is recommended for all persons aged >= 6 months who do not have contraindications   Pneumococcal Vaccine   * Pneumococcal conjugate vaccine = PCV13 (Prevnar 13), PCV15 (Vaxneuvance), PCV20 (Prevnar 20)  * Pneumococcal polysaccharide vaccine =  PPSV23 (Pneumovax) Adults 19-63 yo with certain risk factors or if 65+ yo  If never received any pneumonia vaccine: recommend Prevnar 20 (PCV20)  Give PCV20 if previously received 1 dose of PCV13 or PPSV23   Hepatitis B Vaccine 3 dose series if at intermediate or high risk (ex: diabetes, end stage renal disease, liver disease)   Respiratory syncytial virus (RSV) Vaccine - COVERED BY MEDICARE PART D  * RSVPreF3 (Arexvy) CDC recommends that adults 60 years of age and older may receive a single dose of RSV vaccine using shared clinical decision-making (SCDM)   Tetanus (Td) Vaccine - COST NOT COVERED BY MEDICARE PART B Following completion of primary series, a booster dose should be given every 10 years to maintain immunity against tetanus. Td may also be given as tetanus wound prophylaxis.   Tdap Vaccine - COST NOT COVERED BY MEDICARE PART B Recommended at least once for all adults. For pregnant patients, recommended with each pregnancy.   Shingles Vaccine (Shingrix) - COST NOT COVERED BY MEDICARE PART B  2 shot series recommended in those 19 years and older who have or will have weakened immune systems or those 50 years and older     Health Maintenance Due:      Topic Date Due   • Colorectal Cancer Screening  05/23/2028   • Hepatitis C Screening  Completed     Immunizations Due:      Topic Date Due   • Pneumococcal Vaccine: 65+ Years (1 of 1 - PCV) Never done   • COVID-19 Vaccine (1 - 2023-24 season) Never done     Advance Directives   What are advance directives?  Advance directives are legal documents that state your wishes and plans for medical care. These plans are made ahead of time in case you lose your ability to make decisions for yourself. Advance directives can apply to any medical decision, such as the treatments you want, and if you want to donate organs.   What are the types of advance directives?  There are many types of advance directives, and each state has rules about how to use them. You may choose a  combination of any of the following:  Living will:  This is a written record of the treatment you want. You can also choose which treatments you do not want, which to limit, and which to stop at a certain time. This includes surgery, medicine, IV fluid, and tube feedings.   Durable power of  for healthcare (DPAHC):  This is a written record that states who you want to make healthcare choices for you when you are unable to make them for yourself. This person, called a proxy, is usually a family member or a friend. You may choose more than 1 proxy.  Do not resuscitate (DNR) order:  A DNR order is used in case your heart stops beating or you stop breathing. It is a request not to have certain forms of treatment, such as CPR. A DNR order may be included in other types of advance directives.  Medical directive:  This covers the care that you want if you are in a coma, near death, or unable to make decisions for yourself. You can list the treatments you want for each condition. Treatment may include pain medicine, surgery, blood transfusions, dialysis, IV or tube feedings, and a ventilator (breathing machine).  Values history:  This document has questions about your views, beliefs, and how you feel and think about life. This information can help others choose the care that you would choose.  Why are advance directives important?  An advance directive helps you control your care. Although spoken wishes may be used, it is better to have your wishes written down. Spoken wishes can be misunderstood, or not followed. Treatments may be given even if you do not want them. An advance directive may make it easier for your family to make difficult choices about your care.       © Copyright [employer] 2018 Information is for End User's use only and may not be sold, redistributed or otherwise used for commercial purposes. All illustrations and images included in CareNotes® are the copyrighted property of [employer]., Inc.  or [employer]

## 2024-07-28 PROBLEM — Z00.00 MEDICARE ANNUAL WELLNESS VISIT, SUBSEQUENT: Status: RESOLVED | Noted: 2024-06-28 | Resolved: 2024-07-28

## 2024-08-08 ENCOUNTER — TELEPHONE (OUTPATIENT)
Dept: LAB | Facility: HOSPITAL | Age: 69
End: 2024-08-08

## 2024-08-15 ENCOUNTER — APPOINTMENT (OUTPATIENT)
Dept: LAB | Facility: HOSPITAL | Age: 69
End: 2024-08-15
Payer: COMMERCIAL

## 2024-08-15 DIAGNOSIS — C88.0 WALDENSTROM'S MACROGLOBULINEMIA (HCC): ICD-10-CM

## 2024-08-15 LAB
ALBUMIN SERPL BCG-MCNC: 4.3 G/DL (ref 3.5–5)
ALP SERPL-CCNC: 68 U/L (ref 34–104)
ALT SERPL W P-5'-P-CCNC: 19 U/L (ref 7–52)
ANION GAP SERPL CALCULATED.3IONS-SCNC: 10 MMOL/L (ref 4–13)
AST SERPL W P-5'-P-CCNC: 20 U/L (ref 13–39)
BASOPHILS # BLD AUTO: 0.13 THOUSANDS/ÂΜL (ref 0–0.1)
BASOPHILS NFR BLD AUTO: 2 % (ref 0–1)
BILIRUB SERPL-MCNC: 1.05 MG/DL (ref 0.2–1)
BUN SERPL-MCNC: 11 MG/DL (ref 5–25)
CALCIUM SERPL-MCNC: 9.2 MG/DL (ref 8.4–10.2)
CHLORIDE SERPL-SCNC: 101 MMOL/L (ref 96–108)
CO2 SERPL-SCNC: 24 MMOL/L (ref 21–32)
CREAT SERPL-MCNC: 0.89 MG/DL (ref 0.6–1.3)
EOSINOPHIL # BLD AUTO: 0.17 THOUSAND/ÂΜL (ref 0–0.61)
EOSINOPHIL NFR BLD AUTO: 2 % (ref 0–6)
ERYTHROCYTE [DISTWIDTH] IN BLOOD BY AUTOMATED COUNT: 12.4 % (ref 11.6–15.1)
GFR SERPL CREATININE-BSD FRML MDRD: 89 ML/MIN/1.73SQ M
GLUCOSE P FAST SERPL-MCNC: 82 MG/DL (ref 65–99)
HCT VFR BLD AUTO: 44.5 % (ref 36.5–49.3)
HGB BLD-MCNC: 15.3 G/DL (ref 12–17)
IGA SERPL-MCNC: 42 MG/DL (ref 66–433)
IGG SERPL-MCNC: 285 MG/DL (ref 635–1741)
IGM SERPL-MCNC: 29 MG/DL (ref 45–281)
IMM GRANULOCYTES # BLD AUTO: 0.1 THOUSAND/UL (ref 0–0.2)
IMM GRANULOCYTES NFR BLD AUTO: 1 % (ref 0–2)
LYMPHOCYTES # BLD AUTO: 1.92 THOUSANDS/ÂΜL (ref 0.6–4.47)
LYMPHOCYTES NFR BLD AUTO: 24 % (ref 14–44)
MCH RBC QN AUTO: 31.1 PG (ref 26.8–34.3)
MCHC RBC AUTO-ENTMCNC: 34.4 G/DL (ref 31.4–37.4)
MCV RBC AUTO: 90 FL (ref 82–98)
MONOCYTES # BLD AUTO: 0.72 THOUSAND/ÂΜL (ref 0.17–1.22)
MONOCYTES NFR BLD AUTO: 9 % (ref 4–12)
NEUTROPHILS # BLD AUTO: 4.88 THOUSANDS/ÂΜL (ref 1.85–7.62)
NEUTS SEG NFR BLD AUTO: 62 % (ref 43–75)
NRBC BLD AUTO-RTO: 0 /100 WBCS
PLATELET # BLD AUTO: 201 THOUSANDS/UL (ref 149–390)
PMV BLD AUTO: 11.2 FL (ref 8.9–12.7)
POTASSIUM SERPL-SCNC: 3.9 MMOL/L (ref 3.5–5.3)
PROT SERPL-MCNC: 6.4 G/DL (ref 6.4–8.4)
RBC # BLD AUTO: 4.92 MILLION/UL (ref 3.88–5.62)
SODIUM SERPL-SCNC: 135 MMOL/L (ref 135–147)
WBC # BLD AUTO: 7.92 THOUSAND/UL (ref 4.31–10.16)

## 2024-08-15 PROCEDURE — 83521 IG LIGHT CHAINS FREE EACH: CPT

## 2024-08-15 PROCEDURE — 85025 COMPLETE CBC W/AUTO DIFF WBC: CPT

## 2024-08-15 PROCEDURE — 36415 COLL VENOUS BLD VENIPUNCTURE: CPT

## 2024-08-15 PROCEDURE — 86334 IMMUNOFIX E-PHORESIS SERUM: CPT

## 2024-08-15 PROCEDURE — 82784 ASSAY IGA/IGD/IGG/IGM EACH: CPT

## 2024-08-15 PROCEDURE — 80053 COMPREHEN METABOLIC PANEL: CPT

## 2024-08-15 PROCEDURE — 84165 PROTEIN E-PHORESIS SERUM: CPT

## 2024-08-16 LAB
KAPPA LC FREE SER-MCNC: 8.3 MG/L (ref 3.3–19.4)
KAPPA LC FREE/LAMBDA FREE SER: 2.37 {RATIO} (ref 0.26–1.65)
LAMBDA LC FREE SERPL-MCNC: 3.5 MG/L (ref 5.7–26.3)

## 2024-08-17 LAB
ALBUMIN SERPL ELPH-MCNC: 4.38 G/DL (ref 3.2–5.1)
ALBUMIN SERPL ELPH-MCNC: 71.8 % (ref 48–70)
ALPHA1 GLOB SERPL ELPH-MCNC: 0.24 G/DL (ref 0.15–0.47)
ALPHA1 GLOB SERPL ELPH-MCNC: 3.9 % (ref 1.8–7)
ALPHA2 GLOB SERPL ELPH-MCNC: 0.55 G/DL (ref 0.42–1.04)
ALPHA2 GLOB SERPL ELPH-MCNC: 9 % (ref 5.9–14.9)
BETA GLOB ABNORMAL SERPL ELPH-MCNC: 0.39 G/DL (ref 0.31–0.57)
BETA1 GLOB SERPL ELPH-MCNC: 6.4 % (ref 4.7–7.7)
BETA2 GLOB SERPL ELPH-MCNC: 4.1 % (ref 3.1–7.9)
BETA2+GAMMA GLOB SERPL ELPH-MCNC: 0.25 G/DL (ref 0.2–0.58)
GAMMA GLOB ABNORMAL SERPL ELPH-MCNC: 0.29 G/DL (ref 0.4–1.66)
GAMMA GLOB SERPL ELPH-MCNC: 4.8 % (ref 6.9–22.3)
IGG/ALB SER: 2.55 {RATIO} (ref 1.1–1.8)
INTERPRETATION UR IFE-IMP: NORMAL
M PROTEIN 1 MFR SERPL ELPH: 1.2 %
M PROTEIN 1 SERPL ELPH-MCNC: 0.07 G/DL
PROT PATTERN SERPL ELPH-IMP: ABNORMAL
PROT SERPL-MCNC: 6.1 G/DL (ref 6.4–8.2)

## 2024-08-17 PROCEDURE — 86334 IMMUNOFIX E-PHORESIS SERUM: CPT | Performed by: STUDENT IN AN ORGANIZED HEALTH CARE EDUCATION/TRAINING PROGRAM

## 2024-08-17 PROCEDURE — 84165 PROTEIN E-PHORESIS SERUM: CPT | Performed by: STUDENT IN AN ORGANIZED HEALTH CARE EDUCATION/TRAINING PROGRAM

## 2024-08-21 ENCOUNTER — OFFICE VISIT (OUTPATIENT)
Dept: HEMATOLOGY ONCOLOGY | Facility: CLINIC | Age: 69
End: 2024-08-21
Payer: COMMERCIAL

## 2024-08-21 VITALS
SYSTOLIC BLOOD PRESSURE: 120 MMHG | DIASTOLIC BLOOD PRESSURE: 80 MMHG | OXYGEN SATURATION: 97 % | BODY MASS INDEX: 24.43 KG/M2 | WEIGHT: 152 LBS | RESPIRATION RATE: 18 BRPM | HEART RATE: 75 BPM | TEMPERATURE: 98 F | HEIGHT: 66 IN

## 2024-08-21 DIAGNOSIS — C88.0 WALDENSTROM'S MACROGLOBULINEMIA (HCC): ICD-10-CM

## 2024-08-21 DIAGNOSIS — E80.6 HYPERBILIRUBINEMIA: ICD-10-CM

## 2024-08-21 DIAGNOSIS — D72.820 MONOCLONAL B-CELL LYMPHOCYTOSIS: Primary | ICD-10-CM

## 2024-08-21 PROCEDURE — G2211 COMPLEX E/M VISIT ADD ON: HCPCS | Performed by: NURSE PRACTITIONER

## 2024-08-21 PROCEDURE — 99214 OFFICE O/P EST MOD 30 MIN: CPT | Performed by: NURSE PRACTITIONER

## 2024-08-21 NOTE — PROGRESS NOTES
[facility] [place]  [address] [address]  [phone]  Oncology Progress Note  Jamie Murry, 1955, [id number]  8/21/2024        Assessment/Plan:   1. Monoclonal B-cell lymphocytosis  2. Waldenstrom's macroglobulinemia (HCC)  3. Hyperbilirubinemia  Patient is a 68 yr. Old male with a history of Waldenström macroglobulinemia.  He is on ibrutinib 281 mg p.o. daily tolerating without difficulty. Overall patient is able to function without restriction.  He denies symptoms of infection.  We will continue monitoring labs every 3 months and see him in 6 months.  Patient verbalized understanding and is in agreement with the plan.  Labs from 8/15/2024 show an essentially normal CBC mild basophilia 2%, absolute 0.13.  CMP shows total bilirubin 1.05 quantitative immunoglobulins IgA 42%, IgG 285, IgM 29, free light chains showed decreased lambda light chain 3.5, kappa lambda ratio 2.37.  SPEP shows hypogammaglobulinemia with persistent small monoclonal peak previously identified as IgM kappa.  Peak is 0.07.  Labs are within his established range.  We will see him in 6 months with labs every 3 months.  He is up-to-date on colonoscopy.    - CBC and differential; Standing  - Comprehensive metabolic panel; Standing  - IgG, IgA, IgM; Standing  - Immunoglobulin free LT chains blood; Standing  - Protein electrophoresis, serum; Standing      Patient verbalized understanding and is in agreement to the plan. Patient knows to call the Hematology/Oncology office with any questions and concerns regarding the above.    Goals and Barriers:    Current Goal:   Prolong Survival from Cancer.     Barriers: None.      Patient's Capacity to Self Care:  Patient is able to self care.    Kaci MIGUEL  Medical Oncology/Hematology  [facility]  Network  ______________________________________________________________________________________________________________    Subjective     History of present illness/Cancer History:   Oncology History   Waldenstrom's macroglobulinemia (HCC)   6/8/2018 Biopsy    Overall, the combination of morphologic and immunophenotypic features is consistent with 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 with small cell size and overall low grade appearance. The morphologic features and nonspecific immunophenotype are most consistent with marginal zone lymphoma or lymphoplasmacytic lymphoma. The presence of a monoclonal IgM expressing plasma cell population with expression of the same light chain as the malignant B-cell population in the setting of IgM monoclonal paraprotein may favor lymphoplasmacytic lymphoma, though is not definitively specific.   MYD 88 +.     6/21/2018 Initial Diagnosis    Waldenstrom's macroglobulinemia (HCC)  April 2018 patient had hernia repair.  Leukocytosis was noted.  Peripheral blood flow cytometry showed findings consistent with monoclonal B-cell lymphocytosis.  Additionally, he was found to have a kappa lambda ratio of 36.0.  IgM 3700 with reciprocal inhibition.  Skeletal survey showed no lytic bone foci.     7/11/2018 -  Chemotherapy    Ibrutinib 280 mg p.o. daily.        Lab Results   Component Value Date    WBC 7.92 08/15/2024    HGB 15.3 08/15/2024    HCT 44.5 08/15/2024    MCV 90 08/15/2024     08/15/2024     Lab Results   Component Value Date    SODIUM 135 08/15/2024    K 3.9 08/15/2024     08/15/2024    CO2 24 08/15/2024    AGAP 10 08/15/2024    BUN 11 08/15/2024    CREATININE 0.89 08/15/2024    GLUC 86 06/07/2023    GLUF 82 08/15/2024    CALCIUM 9.2 08/15/2024    AST 20 08/15/2024    ALT 19 08/15/2024    ALKPHOS 68 08/15/2024    TP 6.4 08/15/2024    TP 6.1 (L) 08/15/2024    TBILI 1.05 (H) 08/15/2024    EGFR 89 08/15/2024     Interval history:  clinically stable    "  ECO - Asymptomatic    Review of Systems   Constitutional:  Negative for activity change, appetite change, fatigue, fever and unexpected weight change.   Respiratory:  Negative for cough and shortness of breath.    Cardiovascular:  Negative for chest pain and leg swelling.   Gastrointestinal:  Negative for abdominal pain, constipation, diarrhea and nausea.   Endocrine: Negative for cold intolerance and heat intolerance.   Musculoskeletal:  Negative for arthralgias and myalgias.   Skin: Negative.    Neurological:  Negative for dizziness, weakness and headaches.   Hematological:  Negative for adenopathy. Does not bruise/bleed easily.     Current Outpatient Medications:     Acetaminophen 325 MG CAPS, Take by mouth as needed , Disp: , Rfl:     Azelastine HCl 137 MCG/SPRAY SOLN, USE 1 SPRAY INTO EACH NOSTRIL 2 (TWO) TIMES A DAY AS DIRECTED, Disp: 90 mL, Rfl: 1    Ibrutinib (Imbruvica) 140 MG CAPS, Take 2 capsules (280 mg total) by mouth once daily., Disp: 60 capsule, Rfl: 10    Multiple Vitamin (MULTI VITAMIN MENS PO), Take 1 tablet by mouth daily, Disp: , Rfl:   No Known Allergies    Objective   /80 (BP Location: Right arm, Patient Position: Sitting, Cuff Size: Adult)   Pulse 75   Temp 98 °F (36.7 °C)   Resp 18   Ht 5' 6\" (1.676 m)   Wt 68.9 kg (152 lb)   SpO2 97%   BMI 24.53 kg/m²   Wt Readings from Last 6 Encounters:   24 68.9 kg (152 lb)   24 68.3 kg (150 lb 9.6 oz)   24 71 kg (156 lb 9.6 oz)   24 69.9 kg (154 lb)   23 68 kg (150 lb)   23 68.7 kg (151 lb 6.4 oz)     Physical Exam  Vitals reviewed.   Constitutional:       Appearance: Normal appearance. He is well-developed.   HENT:      Head: Normocephalic and atraumatic.   Eyes:      Pupils: Pupils are equal, round, and reactive to light.   Pulmonary:      Effort: Pulmonary effort is normal. No respiratory distress.   Musculoskeletal:         General: Normal range of motion.      Cervical back: Normal range of " motion.   Lymphadenopathy:      Cervical: No cervical adenopathy.   Skin:     General: Skin is dry.   Neurological:      Mental Status: He is alert and oriented to person, place, and time.   Psychiatric:         Behavior: Behavior normal.     The following historical data was reviewed:  Past Medical History:   Diagnosis Date    Anemia, unspecified 4/30/2018    COVID-19 03/17/2021    Hyperglycemia     Last Assessed:1/18/18    Inguinal hernia bilateral, non-recurrent 2/19/2018    Respiratory tract infection due to COVID-19 virus 3/18/2021    Respiratory tract infection due to COVID-19 virus 3/18/2021    Rib pain on left side 5/1/2018    Unilateral inguinal hernia without obstruction or gangrene 1/18/2018    Waldenstrom's macroglobulinemia (HCC) 6/21/2018     Past Surgical History:   Procedure Laterality Date    WISDOM TOOTH EXTRACTION      Last Assessed:1/18/18     Please note:  This report has been generated by a voice recognition software system. Therefore there may be syntax, spelling, and/or grammatical errors. Please call if you have any questions.

## 2024-10-01 ENCOUNTER — IMMUNIZATIONS (OUTPATIENT)
Age: 69
End: 2024-10-01
Payer: COMMERCIAL

## 2024-10-01 DIAGNOSIS — Z23 ENCOUNTER FOR IMMUNIZATION: Primary | ICD-10-CM

## 2024-10-01 PROCEDURE — 90662 IIV NO PRSV INCREASED AG IM: CPT

## 2024-10-01 PROCEDURE — G0008 ADMIN INFLUENZA VIRUS VAC: HCPCS

## 2025-01-27 ENCOUNTER — DOCUMENTATION (OUTPATIENT)
Dept: HEMATOLOGY ONCOLOGY | Facility: CLINIC | Age: 70
End: 2025-01-27

## 2025-01-27 NOTE — PROGRESS NOTES
[facility] ID  [id number]  Reference Number  [id number] [facility]berterikN[facility], [place]  Waldenstrom Macroglobulinemia  Assistance Type  Co-pay  Start Date  12/28/2024  End Date  12/27/2025  BIN  [id number]  Card #  [id number]  N  [id number]  [facility]  [id number]      Priscilla Richardson MPH  Phone: [phone]  Email: [email or web address]

## 2025-02-14 ENCOUNTER — APPOINTMENT (OUTPATIENT)
Dept: LAB | Facility: HOSPITAL | Age: 70
End: 2025-02-14
Payer: COMMERCIAL

## 2025-02-14 DIAGNOSIS — D72.820 MONOCLONAL B-CELL LYMPHOCYTOSIS: ICD-10-CM

## 2025-02-14 DIAGNOSIS — C88.00 WALDENSTROM'S MACROGLOBULINEMIA: ICD-10-CM

## 2025-02-14 DIAGNOSIS — E80.6 HYPERBILIRUBINEMIA: ICD-10-CM

## 2025-02-14 LAB
ALBUMIN SERPL BCG-MCNC: 4.4 G/DL (ref 3.5–5)
ALP SERPL-CCNC: 65 U/L (ref 34–104)
ALT SERPL W P-5'-P-CCNC: 19 U/L (ref 7–52)
ANION GAP SERPL CALCULATED.3IONS-SCNC: 6 MMOL/L (ref 4–13)
AST SERPL W P-5'-P-CCNC: 21 U/L (ref 13–39)
BASOPHILS # BLD AUTO: 0.09 THOUSANDS/ÂΜL (ref 0–0.1)
BASOPHILS NFR BLD AUTO: 1 % (ref 0–1)
BILIRUB SERPL-MCNC: 1.32 MG/DL (ref 0.2–1)
BUN SERPL-MCNC: 13 MG/DL (ref 5–25)
CALCIUM SERPL-MCNC: 9.2 MG/DL (ref 8.4–10.2)
CHLORIDE SERPL-SCNC: 102 MMOL/L (ref 96–108)
CO2 SERPL-SCNC: 27 MMOL/L (ref 21–32)
CREAT SERPL-MCNC: 0.87 MG/DL (ref 0.6–1.3)
EOSINOPHIL # BLD AUTO: 0.19 THOUSAND/ÂΜL (ref 0–0.61)
EOSINOPHIL NFR BLD AUTO: 2 % (ref 0–6)
ERYTHROCYTE [DISTWIDTH] IN BLOOD BY AUTOMATED COUNT: 12.2 % (ref 11.6–15.1)
GFR SERPL CREATININE-BSD FRML MDRD: 89 ML/MIN/1.73SQ M
GLUCOSE P FAST SERPL-MCNC: 84 MG/DL (ref 65–99)
HCT VFR BLD AUTO: 45.3 % (ref 36.5–49.3)
HGB BLD-MCNC: 15.9 G/DL (ref 12–17)
IGA SERPL-MCNC: 39 MG/DL (ref 66–433)
IGG SERPL-MCNC: 300 MG/DL (ref 635–1741)
IGM SERPL-MCNC: 30 MG/DL (ref 45–281)
IMM GRANULOCYTES # BLD AUTO: 0.07 THOUSAND/UL (ref 0–0.2)
IMM GRANULOCYTES NFR BLD AUTO: 1 % (ref 0–2)
LYMPHOCYTES # BLD AUTO: 1.96 THOUSANDS/ÂΜL (ref 0.6–4.47)
LYMPHOCYTES NFR BLD AUTO: 24 % (ref 14–44)
MCH RBC QN AUTO: 31.2 PG (ref 26.8–34.3)
MCHC RBC AUTO-ENTMCNC: 35.1 G/DL (ref 31.4–37.4)
MCV RBC AUTO: 89 FL (ref 82–98)
MONOCYTES # BLD AUTO: 0.77 THOUSAND/ÂΜL (ref 0.17–1.22)
MONOCYTES NFR BLD AUTO: 9 % (ref 4–12)
NEUTROPHILS # BLD AUTO: 5.18 THOUSANDS/ÂΜL (ref 1.85–7.62)
NEUTS SEG NFR BLD AUTO: 63 % (ref 43–75)
NRBC BLD AUTO-RTO: 0 /100 WBCS
PLATELET # BLD AUTO: 202 THOUSANDS/UL (ref 149–390)
PMV BLD AUTO: 11.6 FL (ref 8.9–12.7)
POTASSIUM SERPL-SCNC: 3.9 MMOL/L (ref 3.5–5.3)
PROT SERPL-MCNC: 6.3 G/DL (ref 6.4–8.4)
RBC # BLD AUTO: 5.1 MILLION/UL (ref 3.88–5.62)
SODIUM SERPL-SCNC: 135 MMOL/L (ref 135–147)
WBC # BLD AUTO: 8.26 THOUSAND/UL (ref 4.31–10.16)

## 2025-02-14 PROCEDURE — 82784 ASSAY IGA/IGD/IGG/IGM EACH: CPT

## 2025-02-14 PROCEDURE — 84165 PROTEIN E-PHORESIS SERUM: CPT

## 2025-02-14 PROCEDURE — 36415 COLL VENOUS BLD VENIPUNCTURE: CPT

## 2025-02-14 PROCEDURE — 83521 IG LIGHT CHAINS FREE EACH: CPT

## 2025-02-14 PROCEDURE — 80053 COMPREHEN METABOLIC PANEL: CPT

## 2025-02-14 PROCEDURE — 85025 COMPLETE CBC W/AUTO DIFF WBC: CPT

## 2025-02-15 LAB
KAPPA LC FREE SER-MCNC: 7.6 MG/L (ref 3.3–19.4)
KAPPA LC FREE/LAMBDA FREE SER: 2.38 {RATIO} (ref 0.26–1.65)
LAMBDA LC FREE SERPL-MCNC: 3.2 MG/L (ref 5.7–26.3)

## 2025-02-18 LAB
ALBUMIN SERPL ELPH-MCNC: 4.3 G/DL (ref 3.2–5.1)
ALBUMIN SERPL ELPH-MCNC: 72.8 % (ref 48–70)
ALPHA1 GLOB SERPL ELPH-MCNC: 0.22 G/DL (ref 0.15–0.47)
ALPHA1 GLOB SERPL ELPH-MCNC: 3.7 % (ref 1.8–7)
ALPHA2 GLOB SERPL ELPH-MCNC: 0.51 G/DL (ref 0.42–1.04)
ALPHA2 GLOB SERPL ELPH-MCNC: 8.6 % (ref 5.9–14.9)
BETA GLOB ABNORMAL SERPL ELPH-MCNC: 0.36 G/DL (ref 0.31–0.57)
BETA1 GLOB SERPL ELPH-MCNC: 6.1 % (ref 4.7–7.7)
BETA2 GLOB SERPL ELPH-MCNC: 4.1 % (ref 3.1–7.9)
BETA2+GAMMA GLOB SERPL ELPH-MCNC: 0.24 G/DL (ref 0.2–0.58)
GAMMA GLOB ABNORMAL SERPL ELPH-MCNC: 0.28 G/DL (ref 0.4–1.66)
GAMMA GLOB SERPL ELPH-MCNC: 4.7 % (ref 6.9–22.3)
IGG/ALB SER: 2.68 {RATIO} (ref 1.1–1.8)
M PROTEIN 1 MFR SERPL ELPH: 1.1 %
M PROTEIN 1 SERPL ELPH-MCNC: 0.06 G/DL
PROT PATTERN SERPL ELPH-IMP: ABNORMAL
PROT SERPL-MCNC: 5.9 G/DL (ref 6.4–8.2)

## 2025-02-18 PROCEDURE — 84165 PROTEIN E-PHORESIS SERUM: CPT | Performed by: STUDENT IN AN ORGANIZED HEALTH CARE EDUCATION/TRAINING PROGRAM

## 2025-02-19 ENCOUNTER — OFFICE VISIT (OUTPATIENT)
Dept: HEMATOLOGY ONCOLOGY | Facility: CLINIC | Age: 70
End: 2025-02-19
Payer: COMMERCIAL

## 2025-02-19 VITALS
OXYGEN SATURATION: 97 % | HEIGHT: 66 IN | RESPIRATION RATE: 17 BRPM | DIASTOLIC BLOOD PRESSURE: 90 MMHG | BODY MASS INDEX: 24.83 KG/M2 | SYSTOLIC BLOOD PRESSURE: 138 MMHG | WEIGHT: 154.5 LBS | TEMPERATURE: 98 F | HEART RATE: 70 BPM

## 2025-02-19 DIAGNOSIS — E80.6 HYPERBILIRUBINEMIA: ICD-10-CM

## 2025-02-19 DIAGNOSIS — D72.820 MONOCLONAL B-CELL LYMPHOCYTOSIS: ICD-10-CM

## 2025-02-19 DIAGNOSIS — C88.00 WALDENSTROM'S MACROGLOBULINEMIA: Primary | ICD-10-CM

## 2025-02-19 PROCEDURE — 99214 OFFICE O/P EST MOD 30 MIN: CPT | Performed by: NURSE PRACTITIONER

## 2025-02-19 PROCEDURE — G2211 COMPLEX E/M VISIT ADD ON: HCPCS | Performed by: NURSE PRACTITIONER

## 2025-02-19 NOTE — ASSESSMENT & PLAN NOTE
Labs from 2/14/2025 show normal CBC and differential, CMP shows a creatinine of 0.87, normal calcium and EGFR 89.  Protein decreased 6.3, total bilirubin 1.32.  Quantitative immunoglobulins show an IgA of 39, IgG 300, IgM 30, immunoglobulin free light chains shows a normal kappa Ig lambda 3.2, kappa lambda ratio 3.28.  These are all within his established range.  SPEP shows hypogammaglobulinemia with a persistent small monoclonal peak identified as IgM kappa.  He continues with the Imbruvica tolerating without difficulty.    Orders:    CBC and differential; Standing    Comprehensive metabolic panel; Standing    Immunoglobulin free LT chains blood; Standing    IgG, IgA, IgM; Standing    Protein electrophoresis, serum; Standing

## 2025-02-19 NOTE — ASSESSMENT & PLAN NOTE
See above.  Orders:    CBC and differential; Standing    Comprehensive metabolic panel; Standing    Immunoglobulin free LT chains blood; Standing    IgG, IgA, IgM; Standing    Protein electrophoresis, serum; Standing

## 2025-02-19 NOTE — PROGRESS NOTES
Name: Jamie Murry      : 1955      MRN: [id number]  Encounter Provider: LAMONT Neri  Encounter Date: 2025   Encounter department: [facility] [place]  :  Assessment & Plan  Waldenstrom's macroglobulinemia   Labs from 2025 show normal CBC and differential, CMP shows a creatinine of 0.87, normal calcium and EGFR 89.  Protein decreased 6.3, total bilirubin 1.32.  Quantitative immunoglobulins show an IgA of 39, IgG 300, IgM 30, immunoglobulin free light chains shows a normal kappa Ig lambda 3.2, kappa lambda ratio 3.28.  These are all within his established range.  SPEP shows hypogammaglobulinemia with a persistent small monoclonal peak identified as IgM kappa.  He continues with the Imbruvica tolerating without difficulty.    Orders:    CBC and differential; Standing    Comprehensive metabolic panel; Standing    Immunoglobulin free LT chains blood; Standing    IgG, IgA, IgM; Standing    Protein electrophoresis, serum; Standing    Monoclonal B-cell lymphocytosis  See above.  Orders:    CBC and differential; Standing    Comprehensive metabolic panel; Standing    Immunoglobulin free LT chains blood; Standing    IgG, IgA, IgM; Standing    Protein electrophoresis, serum; Standing    Hyperbilirubinemia  See above.  Return in about 6 months (around 2025) for Office Visit.    History of Present Illness   Chief Complaint   Patient presents with    Follow-up     Pertinent Medical History   25: Patient is a 69-year-old male  with a history of Waldenström macroglobulinemia.  He is on ibrutinib 281 mg p.o. daily tolerating without difficulty. Overall patient is able to function without restriction.  He denies symptoms of infection.     Oncology History   Waldenstrom's macroglobulinemia   2018 Biopsy    Overall, the combination of morphologic and immunophenotypic features is consistent with 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 with small  "cell size and overall low grade appearance. The morphologic features and nonspecific immunophenotype are most consistent with marginal zone lymphoma or lymphoplasmacytic lymphoma. The presence of a monoclonal IgM expressing plasma cell population with expression of the same light chain as the malignant B-cell population in the setting of IgM monoclonal paraprotein may favor lymphoplasmacytic lymphoma, though is not definitively specific.   MYD 88 +.     6/21/2018 Initial Diagnosis    Waldenstrom's macroglobulinemia (HCC)  April 2018 patient had hernia repair.  Leukocytosis was noted.  Peripheral blood flow cytometry showed findings consistent with monoclonal B-cell lymphocytosis.  Additionally, he was found to have a kappa lambda ratio of 36.0.  IgM 3700 with reciprocal inhibition.  Skeletal survey showed no lytic bone foci.     7/11/2018 -  Chemotherapy    Ibrutinib 280 mg p.o. daily.        Review of Systems   Constitutional:  Negative for activity change, appetite change, fatigue, fever and unexpected weight change.   Respiratory:  Negative for cough and shortness of breath.    Cardiovascular:  Negative for chest pain and leg swelling.   Gastrointestinal:  Negative for abdominal pain, constipation, diarrhea and nausea.   Endocrine: Negative for cold intolerance and heat intolerance.   Musculoskeletal:  Negative for arthralgias and myalgias.   Skin: Negative.    Neurological:  Negative for dizziness, weakness and headaches.   Hematological:  Negative for adenopathy. Does not bruise/bleed easily.       Objective   /90 (BP Location: Left arm, Patient Position: Sitting, Cuff Size: Standard)   Pulse 70   Temp 98 °F (36.7 °C) (Temporal)   Resp 17   Ht 5' 6\" (1.676 m)   Wt 70.1 kg (154 lb 8 oz)   SpO2 97%   BMI 24.94 kg/m²     Physical Exam  Vitals reviewed.   Constitutional:       Appearance: Normal appearance. He is well-developed.   HENT:      Head: Normocephalic and atraumatic.   Eyes:      Pupils: " Pupils are equal, round, and reactive to light.   Pulmonary:      Effort: Pulmonary effort is normal. No respiratory distress.   Musculoskeletal:         General: Normal range of motion.      Cervical back: Normal range of motion.   Lymphadenopathy:      Cervical: No cervical adenopathy.   Skin:     General: Skin is dry.   Neurological:      Mental Status: He is alert and oriented to person, place, and time.   Psychiatric:         Behavior: Behavior normal.     Labs: I have reviewed the following labs:  Results for orders placed or performed in visit on 02/14/25   CBC and differential   Result Value Ref Range    WBC 8.26 4.31 - 10.16 Thousand/uL    RBC 5.10 3.88 - 5.62 Million/uL    Hemoglobin 15.9 12.0 - 17.0 g/dL    Hematocrit 45.3 36.5 - 49.3 %    MCV 89 82 - 98 fL    MCH 31.2 26.8 - 34.3 pg    MCHC 35.1 31.4 - 37.4 g/dL    RDW 12.2 11.6 - 15.1 %    MPV 11.6 8.9 - 12.7 fL    Platelets 202 149 - 390 Thousands/uL    nRBC 0 /100 WBCs    Segmented % 63 43 - 75 %    Immature Grans % 1 0 - 2 %    Lymphocytes % 24 14 - 44 %    Monocytes % 9 4 - 12 %    Eosinophils Relative 2 0 - 6 %    Basophils Relative 1 0 - 1 %    Absolute Neutrophils 5.18 1.85 - 7.62 Thousands/µL    Absolute Immature Grans 0.07 0.00 - 0.20 Thousand/uL    Absolute Lymphocytes 1.96 0.60 - 4.47 Thousands/µL    Absolute Monocytes 0.77 0.17 - 1.22 Thousand/µL    Eosinophils Absolute 0.19 0.00 - 0.61 Thousand/µL    Basophils Absolute 0.09 0.00 - 0.10 Thousands/µL   Comprehensive metabolic panel   Result Value Ref Range    Sodium 135 135 - 147 mmol/L    Potassium 3.9 3.5 - 5.3 mmol/L    Chloride 102 96 - 108 mmol/L    CO2 27 21 - 32 mmol/L    ANION GAP 6 4 - 13 mmol/L    BUN 13 5 - 25 mg/dL    Creatinine 0.87 0.60 - 1.30 mg/dL    Glucose, Fasting 84 65 - 99 mg/dL    Calcium 9.2 8.4 - 10.2 mg/dL    AST 21 13 - 39 U/L    ALT 19 7 - 52 U/L    Alkaline Phosphatase 65 34 - 104 U/L    Total Protein 6.3 (L) 6.4 - 8.4 g/dL    Albumin 4.4 3.5 - 5.0 g/dL    Total  Bilirubin 1.32 (H) 0.20 - 1.00 mg/dL    eGFR 89 ml/min/1.73sq m   IgG, IgA, IgM   Result Value Ref Range    IGA 39 (L) 66 - 433 mg/dL     (L) 635 - 1,741 mg/dL    IGM 30 (L) 45 - 281 mg/dL   Immunoglobulin free LT chains blood   Result Value Ref Range    Ig Kappa Free Light Chain 7.6 3.3 - 19.4 mg/L    Ig Lambda Free Light Chain 3.2 (L) 5.7 - 26.3 mg/L    Kappa/Lambda FluidC Ratio 2.38 (H) 0.26 - 1.65   Protein electrophoresis, serum   Result Value Ref Range    A/G Ratio 2.68 (H) 1.10 - 1.80    Albumin Electrophoresis 72.8 (H) 48.0 - 70.0 %    Albumin CONC 4.30 3.20 - 5.10 g/dl    Alpha 1 3.7 1.8 - 7.0 %    ALPHA 1 CONC 0.22 0.15 - 0.47 g/dL    Alpha 2 8.6 5.9 - 14.9 %    ALPHA 2 CONC 0.51 0.42 - 1.04 g/dL    Beta-1 6.1 4.7 - 7.7 %    BETA 1 CONC 0.36 0.31 - 0.57 g/dL    Beta-2 4.1 3.1 - 7.9 %    BETA 2 CONC 0.24 0.20 - 0.58 g/dL    Gamma Globulin 4.7 (L) 6.9 - 22.3 %    GAMMA CONC 0.28 (L) 0.40 - 1.66 g/dL    M Peak ID 1 1.10 %    M PEAK 1 CONC 0.06 g/dL    Total Protein 5.9 (L) 6.4 - 8.2 g/dL    SPEP Interpretation See Comment

## 2025-04-04 ENCOUNTER — TELEPHONE (OUTPATIENT)
Age: 70
End: 2025-04-04

## 2025-04-04 NOTE — TELEPHONE ENCOUNTER
Patient calling.    Inform Ms Arnie patient will get lab work 2 weeks earlier than usual.    Due to problems getting mother scheduled for appointments.

## 2025-04-29 DIAGNOSIS — C88.00 WALDENSTROM'S MACROGLOBULINEMIA: ICD-10-CM

## 2025-04-30 RX ORDER — IBRUTINIB 140 MG/1
280 CAPSULE ORAL DAILY
Qty: 60 CAPSULE | Refills: 10 | Status: SHIPPED | OUTPATIENT
Start: 2025-04-30

## 2025-05-20 ENCOUNTER — APPOINTMENT (OUTPATIENT)
Dept: LAB | Facility: HOSPITAL | Age: 70
End: 2025-05-20
Payer: COMMERCIAL

## 2025-05-20 DIAGNOSIS — C88.00 WALDENSTROM'S MACROGLOBULINEMIA: ICD-10-CM

## 2025-05-20 DIAGNOSIS — E80.6 HYPERBILIRUBINEMIA: ICD-10-CM

## 2025-05-20 DIAGNOSIS — D72.820 MONOCLONAL B-CELL LYMPHOCYTOSIS: ICD-10-CM

## 2025-05-20 LAB
ALBUMIN SERPL BCG-MCNC: 4.5 G/DL (ref 3.5–5)
ALP SERPL-CCNC: 73 U/L (ref 34–104)
ALT SERPL W P-5'-P-CCNC: 16 U/L (ref 7–52)
ANION GAP SERPL CALCULATED.3IONS-SCNC: 11 MMOL/L (ref 4–13)
AST SERPL W P-5'-P-CCNC: 20 U/L (ref 13–39)
BASOPHILS # BLD AUTO: 0.08 THOUSANDS/ÂΜL (ref 0–0.1)
BASOPHILS NFR BLD AUTO: 1 % (ref 0–1)
BILIRUB SERPL-MCNC: 1.11 MG/DL (ref 0.2–1)
BUN SERPL-MCNC: 11 MG/DL (ref 5–25)
CALCIUM SERPL-MCNC: 9.6 MG/DL (ref 8.4–10.2)
CHLORIDE SERPL-SCNC: 104 MMOL/L (ref 96–108)
CO2 SERPL-SCNC: 27 MMOL/L (ref 21–32)
CREAT SERPL-MCNC: 0.8 MG/DL (ref 0.6–1.3)
EOSINOPHIL # BLD AUTO: 0.18 THOUSAND/ÂΜL (ref 0–0.61)
EOSINOPHIL NFR BLD AUTO: 2 % (ref 0–6)
ERYTHROCYTE [DISTWIDTH] IN BLOOD BY AUTOMATED COUNT: 12.7 % (ref 11.6–15.1)
GFR SERPL CREATININE-BSD FRML MDRD: 92 ML/MIN/1.73SQ M
GLUCOSE SERPL-MCNC: 79 MG/DL (ref 65–140)
HCT VFR BLD AUTO: 46.3 % (ref 36.5–49.3)
HGB BLD-MCNC: 15.6 G/DL (ref 12–17)
IGA SERPL-MCNC: 45 MG/DL (ref 66–433)
IGG SERPL-MCNC: 282 MG/DL (ref 635–1741)
IGM SERPL-MCNC: 36 MG/DL (ref 45–281)
IMM GRANULOCYTES # BLD AUTO: 0.09 THOUSAND/UL (ref 0–0.2)
IMM GRANULOCYTES NFR BLD AUTO: 1 % (ref 0–2)
LYMPHOCYTES # BLD AUTO: 1.65 THOUSANDS/ÂΜL (ref 0.6–4.47)
LYMPHOCYTES NFR BLD AUTO: 22 % (ref 14–44)
MCH RBC QN AUTO: 31.1 PG (ref 26.8–34.3)
MCHC RBC AUTO-ENTMCNC: 33.7 G/DL (ref 31.4–37.4)
MCV RBC AUTO: 92 FL (ref 82–98)
MONOCYTES # BLD AUTO: 0.8 THOUSAND/ÂΜL (ref 0.17–1.22)
MONOCYTES NFR BLD AUTO: 11 % (ref 4–12)
NEUTROPHILS # BLD AUTO: 4.78 THOUSANDS/ÂΜL (ref 1.85–7.62)
NEUTS SEG NFR BLD AUTO: 63 % (ref 43–75)
NRBC BLD AUTO-RTO: 0 /100 WBCS
PLATELET # BLD AUTO: 206 THOUSANDS/UL (ref 149–390)
PMV BLD AUTO: 11.6 FL (ref 8.9–12.7)
POTASSIUM SERPL-SCNC: 4.2 MMOL/L (ref 3.5–5.3)
PROT SERPL-MCNC: 6.4 G/DL (ref 6.4–8.4)
RBC # BLD AUTO: 5.01 MILLION/UL (ref 3.88–5.62)
SODIUM SERPL-SCNC: 142 MMOL/L (ref 135–147)
WBC # BLD AUTO: 7.58 THOUSAND/UL (ref 4.31–10.16)

## 2025-05-20 PROCEDURE — 82784 ASSAY IGA/IGD/IGG/IGM EACH: CPT

## 2025-05-20 PROCEDURE — 83521 IG LIGHT CHAINS FREE EACH: CPT

## 2025-05-20 PROCEDURE — 36415 COLL VENOUS BLD VENIPUNCTURE: CPT

## 2025-05-20 PROCEDURE — 80053 COMPREHEN METABOLIC PANEL: CPT

## 2025-05-20 PROCEDURE — 84165 PROTEIN E-PHORESIS SERUM: CPT

## 2025-05-20 PROCEDURE — 85025 COMPLETE CBC W/AUTO DIFF WBC: CPT

## 2025-05-21 LAB
ALBUMIN SERPL ELPH-MCNC: 4.43 G/DL (ref 3.2–5.1)
ALBUMIN SERPL ELPH-MCNC: 71.5 % (ref 48–70)
ALPHA1 GLOB SERPL ELPH-MCNC: 0.25 G/DL (ref 0.15–0.47)
ALPHA1 GLOB SERPL ELPH-MCNC: 4 % (ref 1.8–7)
ALPHA2 GLOB SERPL ELPH-MCNC: 0.58 G/DL (ref 0.42–1.04)
ALPHA2 GLOB SERPL ELPH-MCNC: 9.4 % (ref 5.9–14.9)
BETA GLOB ABNORMAL SERPL ELPH-MCNC: 0.38 G/DL (ref 0.31–0.57)
BETA1 GLOB SERPL ELPH-MCNC: 6.2 % (ref 4.7–7.7)
BETA2 GLOB SERPL ELPH-MCNC: 4.3 % (ref 3.1–7.9)
BETA2+GAMMA GLOB SERPL ELPH-MCNC: 0.27 G/DL (ref 0.2–0.58)
GAMMA GLOB ABNORMAL SERPL ELPH-MCNC: 0.29 G/DL (ref 0.4–1.66)
GAMMA GLOB SERPL ELPH-MCNC: 4.6 % (ref 6.9–22.3)
IGG/ALB SER: 2.51 {RATIO} (ref 1.1–1.8)
KAPPA LC FREE SER-MCNC: 7 MG/L (ref 3.3–19.4)
KAPPA LC FREE/LAMBDA FREE SER: 2 {RATIO} (ref 0.26–1.65)
LAMBDA LC FREE SERPL-MCNC: 3.5 MG/L (ref 5.7–26.3)
M PROTEIN 1 SERPL ELPH-MCNC: 0.07 G/DL
PROT SERPL-MCNC: 6.2 G/DL (ref 6.4–8.4)

## 2025-05-21 PROCEDURE — 84165 PROTEIN E-PHORESIS SERUM: CPT | Performed by: PATHOLOGY

## 2025-06-23 ENCOUNTER — RA CDI HCC (OUTPATIENT)
Dept: OTHER | Facility: HOSPITAL | Age: 70
End: 2025-06-23

## 2025-06-27 RX ORDER — SODIUM FLUORIDE 6 MG/ML
PASTE, DENTIFRICE DENTAL
COMMUNITY
Start: 2025-05-06

## 2025-06-30 ENCOUNTER — OFFICE VISIT (OUTPATIENT)
Age: 70
End: 2025-06-30
Payer: COMMERCIAL

## 2025-06-30 VITALS
WEIGHT: 153.4 LBS | HEART RATE: 70 BPM | HEIGHT: 67 IN | SYSTOLIC BLOOD PRESSURE: 136 MMHG | BODY MASS INDEX: 24.08 KG/M2 | OXYGEN SATURATION: 96 % | TEMPERATURE: 99.3 F | DIASTOLIC BLOOD PRESSURE: 72 MMHG

## 2025-06-30 DIAGNOSIS — Z00.00 MEDICARE ANNUAL WELLNESS VISIT, SUBSEQUENT: Primary | ICD-10-CM

## 2025-06-30 PROCEDURE — G0439 PPPS, SUBSEQ VISIT: HCPCS | Performed by: INTERNAL MEDICINE

## 2025-06-30 NOTE — PROGRESS NOTES
Name: Jamie Murry      : 1955      MRN: [id number]  Encounter Provider: Leobardo Sharma MD  Encounter Date: 2025   Encounter department: [facility]  :  Assessment & Plan       Preventive health issues were discussed with patient, and age appropriate screening tests were ordered as noted in patient's After Visit Summary. Personalized health advice and appropriate referrals for health education or preventive services given if needed, as noted in patient's After Visit Summary.    History of Present Illness     Presents for a Medicare Wellness visit. Offers no complaints. 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 No complaints at present       Patient Care Team:  Leobardo Sharma MD as PCP - General (Internal Medicine)  Leobardo Sharma MD as PCP - PCP-[place] ([facility])  Magdiel Del Toro MD as PCP - PCP-[facility] ([facility])  Steven Torrez DO as Medical Oncologist (Hematology and Oncology)  LAMONT Neri as Nurse Practitioner (Hematology and Oncology)  Priscilla Richardson as  (Oncology)    Review of Systems  Medical History Reviewed by provider this encounter:       Annual Wellness Visit Questionnaire   Jamie is here for his Subsequent Wellness visit.     Health Risk Assessment:   Patient rates overall health as excellent. Patient feels that their physical health rating is same. Patient is very satisfied with their life. Eyesight was rated as same. Hearing was rated as same. Patient feels that their emotional and mental health rating is same. Patients states they are never, rarely angry. Patient states they are never, rarely unusually tired/fatigued. Pain experienced in the last 7 days has been none. Patient states that he has experienced no weight loss or gain in last 6 months.     Depression Screening:   PHQ-2 Score: 0      Fall Risk Screening:   In the past year, patient has experienced:  no history of falling in past year      Home Safety:  Patient does not have trouble with stairs inside or outside of their home. Patient has working smoke alarms and has working carbon monoxide detector. Home safety hazards include: none.     Nutrition:   No meat    Medications:   Patient is currently taking over-the-counter supplements. OTC medications include: see medication list. Patient is able to manage medications.     Activities of Daily Living (ADLs)/Instrumental Activities of Daily Living (IADLs):   Walk and transfer into and out of bed and chair?: Yes  Dress and groom yourself?: Yes    Bathe or shower yourself?: Yes    Feed yourself? Yes  Do your laundry/housekeeping?: Yes  Manage your money, pay your bills and track your expenses?: Yes  Make your own meals?: Yes    Do your own shopping?: Yes    Durable Medical Equipment Suppliers  no    Previous Hospitalizations:   Any hospitalizations or ED visits within the last 12 months?: No      Advance Care Planning:   Living will: No    Durable POA for healthcare: No    Advanced directive: No    Advanced directive counseling given: No    Five wishes given: No    Patient declined ACP directive: Yes    End of Life Decisions reviewed with patient: Yes    Provider agrees with end of life decisions: Yes      Cognitive Screening:   Provider or family/friend/caregiver concerned regarding cognition?: No    Preventive Screenings      Cardiovascular Screening:    General: Screening Current      Diabetes Screening:     General: Screening Current      Colorectal Cancer Screening:     General: Screening Current      Prostate Cancer Screening:    General: Risks and Benefits Discussed    Due for: PSA      Osteoporosis Screening:    General: Screening Not Indicated      Abdominal Aortic Aneurysm (AAA) Screening:    Risk factors include: age between 65-74 yo        General: Screening Not Indicated      Lung Cancer Screening:     General: Screening Not Indicated      Hepatitis C  Screening:    General: Screening Current    Immunizations:  - Immunizations due: Prevnar 20 and Zoster (Shingrix)    Screening, Brief Intervention, and Referral to Treatment (SBIRT)     Screening  Typical number of drinks in a day: 0  Typical number of drinks in a week: 0  Interpretation: Low risk drinking behavior.    Single Item Drug Screening:  How often have you used an illegal drug (including marijuana) or a prescription medication for non-medical reasons in the past year? never    Single Item Drug Screen Score: 0  Interpretation: Negative screen for possible drug use disorder    Social Drivers of Health     Financial Resource Strain: Low Risk  (6/27/2023)    Overall Financial Resource Strain (CARDIA)     Difficulty of Paying Living Expenses: Not hard at all   Food Insecurity: No Food Insecurity (6/28/2024)    Nursing - Inadequate Food Risk Classification     Worried About Running Out of Food in the Last Year: Never true     Ran Out of Food in the Last Year: Never true   Transportation Needs: No Transportation Needs (6/28/2024)    PRAPARE - Transportation     Lack of Transportation (Medical): No     Lack of Transportation (Non-Medical): No   Housing Stability: Low Risk  (6/28/2024)    Housing Stability Vital Sign     Unable to Pay for Housing in the Last Year: No     Number of Times Moved in the Last Year: 0     Homeless in the Last Year: No   Utilities: Not At Risk (6/28/2024)    [facility] Utilities     Threatened with loss of utilities: No     No results found.    Objective   There were no vitals taken for this visit.    Physical Exam

## 2025-07-14 ENCOUNTER — TELEPHONE (OUTPATIENT)
Dept: LAB | Facility: HOSPITAL | Age: 70
End: 2025-07-14

## 2025-07-30 PROBLEM — Z00.00 MEDICARE ANNUAL WELLNESS VISIT, SUBSEQUENT: Status: RESOLVED | Noted: 2024-06-28 | Resolved: 2025-07-30

## 2025-08-12 ENCOUNTER — APPOINTMENT (OUTPATIENT)
Dept: LAB | Facility: HOSPITAL | Age: 70
End: 2025-08-12
Payer: COMMERCIAL

## 2025-08-18 ENCOUNTER — TELEPHONE (OUTPATIENT)
Dept: HEMATOLOGY ONCOLOGY | Facility: CLINIC | Age: 70
End: 2025-08-18